# Patient Record
Sex: MALE | Race: WHITE | NOT HISPANIC OR LATINO | Employment: OTHER | URBAN - METROPOLITAN AREA
[De-identification: names, ages, dates, MRNs, and addresses within clinical notes are randomized per-mention and may not be internally consistent; named-entity substitution may affect disease eponyms.]

---

## 2018-09-03 ENCOUNTER — HOSPITAL ENCOUNTER (EMERGENCY)
Facility: HOSPITAL | Age: 68
Discharge: HOME | End: 2018-09-03
Attending: EMERGENCY MEDICINE | Admitting: EMERGENCY MEDICINE
Payer: MEDICARE

## 2018-09-03 VITALS
BODY MASS INDEX: 37.77 KG/M2 | HEIGHT: 66 IN | OXYGEN SATURATION: 99 % | WEIGHT: 235 LBS | SYSTOLIC BLOOD PRESSURE: 136 MMHG | DIASTOLIC BLOOD PRESSURE: 66 MMHG | TEMPERATURE: 98 F | RESPIRATION RATE: 16 BRPM | HEART RATE: 95 BPM

## 2018-09-03 DIAGNOSIS — R60.0 PERIORBITAL EDEMA OF LEFT EYE: Primary | ICD-10-CM

## 2018-09-03 PROCEDURE — 99283 EMERGENCY DEPT VISIT LOW MDM: CPT

## 2018-09-03 RX ORDER — OMEPRAZOLE 40 MG/1
40 CAPSULE, DELAYED RELEASE ORAL DAILY
COMMUNITY

## 2018-09-03 RX ORDER — SIMVASTATIN 40 MG
40 TABLET ORAL
COMMUNITY

## 2018-09-03 RX ORDER — DILTIAZEM HYDROCHLORIDE 120 MG/1
120 TABLET, FILM COATED ORAL DAILY
COMMUNITY

## 2018-09-03 RX ORDER — ALFUZOSIN HYDROCHLORIDE 10 MG/1
10 TABLET, EXTENDED RELEASE ORAL DAILY
COMMUNITY

## 2018-09-03 RX ORDER — METOPROLOL TARTRATE 50 MG/1
25 TABLET, FILM COATED ORAL EVERY 12 HOURS SCHEDULED
COMMUNITY

## 2018-09-03 NOTE — DISCHARGE INSTRUCTIONS
Allergies   AMBULATORY CARE:   Allergies  are an immune system reaction to a substance called an allergen  Your immune system sees the allergen as harmful and attacks it  Common signs and symptoms include the following:   · Mild symptoms  include sneezing and a runny, itchy, or stuffy nose  You may also have swollen, watery, or itchy eyes, or skin itching  You may have swelling or pain where an insect bit or stung you  · Anaphylaxis symptoms  include trouble breathing or swallowing, a rash or hives, or severe swelling  You may also have a cough, wheezing, or feel lightheaded or dizzy  Anaphylaxis is a sudden, life-threatening reaction that needs immediate treatment  Call 911 for signs or symptoms of anaphylaxis,  such as trouble breathing, swelling in your mouth or throat, or wheezing  You may also have itching, a rash, hives, or feel like you are going to faint  Seek care immediately if:   · You have tingling in your hands or feet  · Your skin is red or flushed  Contact your healthcare provider if:   · You have questions or concerns about your condition or care  Steps to take for signs or symptoms of anaphylaxis:   · Immediately  give 1 shot of epinephrine only into the outer thigh muscle  · Leave the shot in place  as directed  Your healthcare provider may recommend you leave it in place for up to 10 seconds before you remove it  This helps make sure all of the epinephrine is delivered  · Call 911 and go to the emergency department,  even if the shot improved symptoms  Do not drive yourself  Bring the used epinephrine shot with you  Treatment for allergies  may include any of the following:  · Antihistamines  help decrease itching, sneezing, and swelling  You may take them as a pill or use drops in your nose or eyes  · Decongestants  help your nose feel less stuffy  · Steroids  decrease swelling and redness  · Topical treatments  help decrease itching or swelling   You also may be given nasal sprays or eyedrops  · Epinephrine  is medicine used to treat severe allergic reactions such as anaphylaxis  · Desensitization  gets your body used to allergens you cannot avoid  Your healthcare provider will give you a shot that contains a small amount of an allergen  He will treat any allergic reaction you have  He will give you more of the allergen a little at a time until your body gets used to it  Your reaction to the allergen may be less serious after this treatment  Your healthcare provider will tell you how long to get the shots  Safety precautions to take if you are at risk for anaphylaxis:   · Keep 2 shots of epinephrine with you at all times  You may need a second shot, because epinephrine only works for about 20 minutes and symptoms may return  Your healthcare provider can show you and family members how to give the shot  Check the expiration date every month and replace it before it expires  · Create an action plan  Your healthcare provider can help you create a written plan that explains the allergy and an emergency plan to treat a reaction  The plan explains when to give a second epinephrine shot if symptoms return or do not improve after the first  Give copies of the action plan and emergency instructions to family members, work and school staff, and  providers  Show them how to give a shot of epinephrine  · Be careful when you exercise  If you have had exercise-induced anaphylaxis, do not exercise right after you eat  Stop exercising right away if you start to develop any signs or symptoms of anaphylaxis  You may first feel tired, warm, or have itchy skin  Hives, swelling, and severe breathing problems may develop if you continue to exercise  · Carry medical alert identification  Wear medical alert jewelry or carry a card that explains the allergy  Ask your healthcare provider where to get these items       · Inform all healthcare providers of the allergy  This includes dentists, nurses, doctors, and surgeons  Manage allergies:   · Use nasal rinses as directed  Rinse with a saline solution daily to help clear your nose of allergens  · Do not smoke  Allergy symptoms may decrease if you are not around smoke  Nicotine and other chemicals in cigarettes and cigars can cause lung damage  Ask your healthcare provider for information if you currently smoke and need help to quit  E-cigarettes or smokeless tobacco still contain nicotine  Talk to your healthcare provider before you use these products  Prevent an allergic reaction:   · Do not go outside when pollen counts are high if you have seasonal allergies  Your symptoms may be better if you go outside only in the morning or evening  Use your air conditioner, and change air filters often  · Avoid dust, fur, and mold  Dust and vacuum your home often  You may want to wear a mask when you vacuum  Keep pets in certain rooms, and bathe them often  Use a dehumidifier (machine that decreases moisture) to help prevent mold  · Do not use products that contain latex if you have a latex allergy  Use nonlatex gloves if you work in healthcare or in food preparation  Always tell healthcare providers about a latex allergy  · Avoid areas that attract insects if you have an insect bite or sting allergy  Areas include trash cans, gardens, and picnics  Do not wear bright clothing or strong scents when you will be outside  Follow up with your healthcare provider as directed:  Write down your questions so you remember to ask them during your visits  When you have an allergic reaction, write down everything you were exposed to in the 2 hours before the reaction  Take that information to your next visit  © 2017 [employer] Information is for End User's use only and may not be sold, redistributed or otherwise used for commercial purposes   All illustrations and images included in CareNotes® are the copyrighted property of [employer]  or Rom[employer]  The above information is an  only  It is not intended as medical advice for individual conditions or treatments  Talk to your doctor, nurse or pharmacist before following any medical regimen to see if it is safe and effective for you

## 2018-09-03 NOTE — ED PROVIDER NOTES
History  Chief Complaint   Patient presents with    Eye Swelling     left upper and lower eyelid swelling starting earlier this am when he felt  like his eye was itchy,then he scratched it,unsure if it was a bug bite     Patient presents for evaluation of left upper and lower eyelid swelling  States it felt itchy and he rubbed it and afterwards noticed the swelling  History of seasonal allergies although not this time of year  No trouble seeing vision is normal  No eye pain  History provided by:  Patient   used: No        Prior to Admission Medications   Prescriptions Last Dose Informant Patient Reported? Taking? alfuzosin (UROXATRAL) 10 mg 24 hr tablet   Yes Yes   Sig: Take 10 mg by mouth daily   apixaban (ELIQUIS) 5 mg   Yes Yes   Sig: Take 5 mg by mouth 2 (two) times a day   diltiazem (CARDIZEM) 120 MG tablet   Yes Yes   Sig: Take 120 mg by mouth daily   metoprolol tartrate (LOPRESSOR) 50 mg tablet   Yes Yes   Sig: Take 25 mg by mouth every 12 (twelve) hours   omeprazole (PriLOSEC) 40 MG capsule   Yes Yes   Sig: Take 40 mg by mouth daily   simvastatin (ZOCOR) 40 mg tablet   Yes Yes   Sig: Take 40 mg by mouth daily at bedtime      Facility-Administered Medications: None       Past Medical History:   Diagnosis Date    Cardiac disease     atrial fib,pacemaker    Hyperlipidemia     Hypertension        History reviewed  No pertinent surgical history  History reviewed  No pertinent family history  I have reviewed and agree with the history as documented  Social History   Substance Use Topics    Smoking status: Former Smoker    Smokeless tobacco: Never Used    Alcohol use Yes      Comment: occas        Review of Systems   All other systems reviewed and are negative  Physical Exam  Physical Exam   Constitutional: He is oriented to person, place, and time  No distress     HENT:   Mouth/Throat: Oropharynx is clear and moist    Eyes: Conjunctivae, EOM and lids are normal  Pupils are equal, round, and reactive to light  Lids are everted and swept, no foreign bodies found  Cardiovascular: Normal rate, regular rhythm and intact distal pulses  Pulmonary/Chest: Effort normal and breath sounds normal  No respiratory distress  Musculoskeletal: Normal range of motion  Neurological: He is alert and oriented to person, place, and time  Skin: Capillary refill takes less than 2 seconds  He is not diaphoretic  Nursing note and vitals reviewed  Vital Signs  ED Triage Vitals [09/03/18 1224]   Temperature Pulse Respirations Blood Pressure SpO2   98 °F (36 7 °C) 95 16 136/66 99 %      Temp Source Heart Rate Source Patient Position - Orthostatic VS BP Location FiO2 (%)   Tympanic Monitor Sitting Right arm --      Pain Score       No Pain           Vitals:    09/03/18 1224   BP: 136/66   Pulse: 95   Patient Position - Orthostatic VS: Sitting       Visual Acuity      ED Medications  Medications - No data to display    Diagnostic Studies  Results Reviewed     None                 No orders to display              Procedures  Procedures       Phone Contacts  ED Phone Contact    ED Course                               MDM  Number of Diagnoses or Management Options  Periorbital edema of left eye:   Diagnosis management comments: Pulse ox 99% on RA indicating adequate oxygenation    Appears to be local reaction, no signs of infection at this time  Given strict return instructions and advised follow up with PMD to resolution          Amount and/or Complexity of Data Reviewed  Decide to obtain previous medical records or to obtain history from someone other than the patient: yes  Review and summarize past medical records: yes    Patient Progress  Patient progress: stable    CritCare Time    Disposition  Final diagnoses:   Periorbital edema of left eye     Time reflects when diagnosis was documented in both MDM as applicable and the Disposition within this note     Time User Action Codes Description Comment    9/3/2018 12:37 PM Wally Fleming Add [R60 0] Periorbital edema of left eye       ED Disposition     ED Disposition Condition Comment    Discharge  Damien Liu discharge to home/self care  Condition at discharge: stable        Follow-up Information     Follow up With Specialties Details Why Contact Info Additional Information    Beatrice Campbell MD  In 2 days For wound re-check Ericka [phone]       [address] [facility] Emergency Department Emergency Medicine  If symptoms worsen [address] [facility] [address]  [phone] [facility], [place], [place], [address]          Discharge Medication List as of 9/3/2018 12:39 PM      CONTINUE these medications which have NOT CHANGED    Details   alfuzosin (UROXATRAL) 10 mg 24 hr tablet Take 10 mg by mouth daily, Historical Med      apixaban (ELIQUIS) 5 mg Take 5 mg by mouth 2 (two) times a day, Historical Med      diltiazem (CARDIZEM) 120 MG tablet Take 120 mg by mouth daily, Historical Med      metoprolol tartrate (LOPRESSOR) 50 mg tablet Take 25 mg by mouth every 12 (twelve) hours, Historical Med      omeprazole (PriLOSEC) 40 MG capsule Take 40 mg by mouth daily, Historical Med      simvastatin (ZOCOR) 40 mg tablet Take 40 mg by mouth daily at bedtime, Historical Med           No discharge procedures on file      ED Provider  Electronically Signed by           Lyudmila Donahue DO  09/03/18 5204

## 2022-09-07 ENCOUNTER — APPOINTMENT (EMERGENCY)
Dept: RADIOLOGY | Facility: HOSPITAL | Age: 72
End: 2022-09-07
Payer: MEDICARE

## 2022-09-07 ENCOUNTER — HOSPITAL ENCOUNTER (EMERGENCY)
Facility: HOSPITAL | Age: 72
Discharge: HOME | End: 2022-09-07
Attending: EMERGENCY MEDICINE
Payer: MEDICARE

## 2022-09-07 ENCOUNTER — APPOINTMENT (OUTPATIENT)
Dept: RADIOLOGY | Facility: HOSPITAL | Age: 72
End: 2022-09-07
Payer: MEDICARE

## 2022-09-07 VITALS
WEIGHT: 290 LBS | OXYGEN SATURATION: 98 % | RESPIRATION RATE: 18 BRPM | SYSTOLIC BLOOD PRESSURE: 153 MMHG | HEART RATE: 60 BPM | DIASTOLIC BLOOD PRESSURE: 67 MMHG | TEMPERATURE: 97.7 F | BODY MASS INDEX: 46.81 KG/M2

## 2022-09-07 DIAGNOSIS — M66.0 RUPTURED BAKERS CYST: Primary | ICD-10-CM

## 2022-09-07 LAB
ALBUMIN SERPL BCP-MCNC: 3.5 G/DL (ref 3.5–5)
ALP SERPL-CCNC: 63 U/L (ref 46–116)
ALT SERPL W P-5'-P-CCNC: 26 U/L (ref 12–78)
ANION GAP SERPL CALCULATED.3IONS-SCNC: 6 MMOL/L (ref 4–13)
APTT PPP: 22 SECONDS (ref 23–37)
AST SERPL W P-5'-P-CCNC: 20 U/L (ref 5–45)
ATRIAL RATE: 70 BPM
BASOPHILS # BLD AUTO: 0.01 THOUSANDS/ΜL (ref 0–0.1)
BASOPHILS NFR BLD AUTO: 0 % (ref 0–1)
BILIRUB SERPL-MCNC: 0.46 MG/DL (ref 0.2–1)
BUN SERPL-MCNC: 20 MG/DL (ref 5–25)
CALCIUM SERPL-MCNC: 8.3 MG/DL (ref 8.3–10.1)
CHLORIDE SERPL-SCNC: 105 MMOL/L (ref 96–108)
CO2 SERPL-SCNC: 28 MMOL/L (ref 21–32)
CREAT SERPL-MCNC: 1.09 MG/DL (ref 0.6–1.3)
EOSINOPHIL # BLD AUTO: 0.05 THOUSAND/ΜL (ref 0–0.61)
EOSINOPHIL NFR BLD AUTO: 1 % (ref 0–6)
ERYTHROCYTE [DISTWIDTH] IN BLOOD BY AUTOMATED COUNT: 14.5 % (ref 11.6–15.1)
FLUAV RNA RESP QL NAA+PROBE: NEGATIVE
FLUBV RNA RESP QL NAA+PROBE: NEGATIVE
GFR SERPL CREATININE-BSD FRML MDRD: 66 ML/MIN/1.73SQ M
GLUCOSE SERPL-MCNC: 132 MG/DL (ref 65–140)
HCT VFR BLD AUTO: 37 % (ref 36.5–49.3)
HGB BLD-MCNC: 12.4 G/DL (ref 12–17)
IMM GRANULOCYTES # BLD AUTO: 0.03 THOUSAND/UL (ref 0–0.2)
IMM GRANULOCYTES NFR BLD AUTO: 1 % (ref 0–2)
INR PPP: 0.97 (ref 0.84–1.19)
LYMPHOCYTES # BLD AUTO: 0.51 THOUSANDS/ΜL (ref 0.6–4.47)
LYMPHOCYTES NFR BLD AUTO: 14 % (ref 14–44)
MAGNESIUM SERPL-MCNC: 1.9 MG/DL (ref 1.6–2.6)
MCH RBC QN AUTO: 35 PG (ref 26.8–34.3)
MCHC RBC AUTO-ENTMCNC: 33.5 G/DL (ref 31.4–37.4)
MCV RBC AUTO: 105 FL (ref 82–98)
MONOCYTES # BLD AUTO: 0.52 THOUSAND/ΜL (ref 0.17–1.22)
MONOCYTES NFR BLD AUTO: 14 % (ref 4–12)
NEUTROPHILS # BLD AUTO: 2.57 THOUSANDS/ΜL (ref 1.85–7.62)
NEUTS SEG NFR BLD AUTO: 70 % (ref 43–75)
NRBC BLD AUTO-RTO: 0 /100 WBCS
NT-PROBNP SERPL-MCNC: 127 PG/ML
P AXIS: 54 DEGREES
PHOSPHATE SERPL-MCNC: 2.7 MG/DL (ref 2.3–4.1)
PLATELET # BLD AUTO: 189 THOUSANDS/UL (ref 149–390)
PMV BLD AUTO: 8.9 FL (ref 8.9–12.7)
POTASSIUM SERPL-SCNC: 3.9 MMOL/L (ref 3.5–5.3)
PR INTERVAL: 148 MS
PROT SERPL-MCNC: 6.6 G/DL (ref 6.4–8.4)
PROTHROMBIN TIME: 13 SECONDS (ref 11.6–14.5)
QRS AXIS: -16 DEGREES
QRSD INTERVAL: 156 MS
QT INTERVAL: 456 MS
QTC INTERVAL: 492 MS
RBC # BLD AUTO: 3.54 MILLION/UL (ref 3.88–5.62)
RSV RNA RESP QL NAA+PROBE: NEGATIVE
SARS-COV-2 RNA RESP QL NAA+PROBE: NEGATIVE
SODIUM SERPL-SCNC: 139 MMOL/L (ref 135–147)
T WAVE AXIS: 12 DEGREES
TSH SERPL DL<=0.05 MIU/L-ACNC: 0.58 UIU/ML (ref 0.45–4.5)
VENTRICULAR RATE: 70 BPM
WBC # BLD AUTO: 3.69 THOUSAND/UL (ref 4.31–10.16)

## 2022-09-07 PROCEDURE — 96374 THER/PROPH/DIAG INJ IV PUSH: CPT

## 2022-09-07 PROCEDURE — 93970 EXTREMITY STUDY: CPT | Performed by: SURGERY

## 2022-09-07 PROCEDURE — 84443 ASSAY THYROID STIM HORMONE: CPT | Performed by: PHYSICIAN ASSISTANT

## 2022-09-07 PROCEDURE — 71045 X-RAY EXAM CHEST 1 VIEW: CPT

## 2022-09-07 PROCEDURE — 85730 THROMBOPLASTIN TIME PARTIAL: CPT | Performed by: PHYSICIAN ASSISTANT

## 2022-09-07 PROCEDURE — 36415 COLL VENOUS BLD VENIPUNCTURE: CPT | Performed by: PHYSICIAN ASSISTANT

## 2022-09-07 PROCEDURE — 0241U HB NFCT DS VIR RESP RNA 4 TRGT: CPT | Performed by: PHYSICIAN ASSISTANT

## 2022-09-07 PROCEDURE — 93010 ELECTROCARDIOGRAM REPORT: CPT | Performed by: INTERNAL MEDICINE

## 2022-09-07 PROCEDURE — 83880 ASSAY OF NATRIURETIC PEPTIDE: CPT | Performed by: PHYSICIAN ASSISTANT

## 2022-09-07 PROCEDURE — 99284 EMERGENCY DEPT VISIT MOD MDM: CPT

## 2022-09-07 PROCEDURE — 85025 COMPLETE CBC W/AUTO DIFF WBC: CPT | Performed by: PHYSICIAN ASSISTANT

## 2022-09-07 PROCEDURE — 80053 COMPREHEN METABOLIC PANEL: CPT | Performed by: PHYSICIAN ASSISTANT

## 2022-09-07 PROCEDURE — 83735 ASSAY OF MAGNESIUM: CPT | Performed by: PHYSICIAN ASSISTANT

## 2022-09-07 PROCEDURE — 84100 ASSAY OF PHOSPHORUS: CPT | Performed by: PHYSICIAN ASSISTANT

## 2022-09-07 PROCEDURE — 99284 EMERGENCY DEPT VISIT MOD MDM: CPT | Performed by: PHYSICIAN ASSISTANT

## 2022-09-07 PROCEDURE — 93005 ELECTROCARDIOGRAM TRACING: CPT

## 2022-09-07 PROCEDURE — 85610 PROTHROMBIN TIME: CPT | Performed by: PHYSICIAN ASSISTANT

## 2022-09-07 PROCEDURE — 93970 EXTREMITY STUDY: CPT

## 2022-09-07 RX ORDER — FUROSEMIDE 10 MG/ML
40 INJECTION INTRAMUSCULAR; INTRAVENOUS ONCE
Status: COMPLETED | OUTPATIENT
Start: 2022-09-07 | End: 2022-09-07

## 2022-09-07 RX ADMIN — FUROSEMIDE 40 MG: 10 INJECTION, SOLUTION INTRAMUSCULAR; INTRAVENOUS at 09:35

## 2022-09-07 NOTE — ED PROVIDER NOTES
History  Chief Complaint   Patient presents with    Leg Pain     Left leg swelling   Pain in the calf  Was oozing clear fluid a few days ago     PT is a 68 yo male with pmh of atrial fibrillation status post permanent pacemaker anticoagulated with Eliquis, GERD, hyperlipidemia, who presents today for evaluation of left lower extremity swelling and pain  Patient specifically denies injury  He describes the pain as intermittent, shooting and cramping, across the back of the calf, the pain is worsened by crossing his legs and lying in bed, the pain is unrelieved by elevation, arthritis medication, or rest       History provided by:  Patient  Leg Pain  Location:  Leg  Time since incident:  1 week  Injury: no    Leg location:  L leg  Pain details:     Quality:  Shooting and cramping    Radiates to:  Does not radiate    Severity:  Moderate    Onset quality:  Gradual    Duration:  1 week    Timing:  Constant    Progression:  Worsening  Chronicity:  New  Dislocation: no    Prior injury to area:  No  Relieved by:  Nothing  Exacerbated by: Crossing legs, lying in bed  Ineffective treatments:  Arthritis medication and rest  Associated symptoms: swelling    Associated symptoms: no back pain, no decreased ROM, no fatigue, no fever, no itching, no muscle weakness, no neck pain, no numbness, no stiffness and no tingling    Swelling:     Location:  Leg    Onset quality:  Gradual    Duration:  1 week    Timing:  Constant    Progression:  Unchanged    Chronicity:  New  Risk factors: no concern for non-accidental trauma, no frequent fractures, no known bone disorder, no obesity and no recent illness        Prior to Admission Medications   Prescriptions Last Dose Informant Patient Reported? Taking?    alfuzosin (UROXATRAL) 10 mg 24 hr tablet   Yes No   Sig: Take 10 mg by mouth daily   apixaban (ELIQUIS) 5 mg   Yes No   Sig: Take 5 mg by mouth 2 (two) times a day   diltiazem (CARDIZEM) 120 MG tablet   Yes No   Sig: Take 120 mg by mouth daily   metoprolol tartrate (LOPRESSOR) 50 mg tablet   Yes No   Sig: Take 25 mg by mouth every 12 (twelve) hours   omeprazole (PriLOSEC) 40 MG capsule   Yes No   Sig: Take 40 mg by mouth daily   simvastatin (ZOCOR) 40 mg tablet   Yes No   Sig: Take 40 mg by mouth daily at bedtime      Facility-Administered Medications: None       Past Medical History:   Diagnosis Date    Cardiac disease     atrial fib,pacemaker    Hyperlipidemia     Hypertension        History reviewed  No pertinent surgical history  History reviewed  No pertinent family history  I have reviewed and agree with the history as documented  E-Cigarette/Vaping    E-Cigarette Use Never User      E-Cigarette/Vaping Substances    Nicotine No     THC No     CBD No     Flavoring No     Other No     Unknown No      Social History     Tobacco Use    Smoking status: Former Smoker    Smokeless tobacco: Never Used   Vaping Use    Vaping Use: Never used   Substance Use Topics    Alcohol use: Yes     Comment: occas    Drug use: Never       Review of Systems   Constitutional: Negative for chills, fatigue and fever  HENT: Negative for ear pain and sore throat  Eyes: Negative for pain and visual disturbance  Respiratory: Negative for cough and shortness of breath  Cardiovascular: Negative for chest pain and palpitations  Gastrointestinal: Negative for abdominal pain and vomiting  Endocrine: Negative  Genitourinary: Negative for dysuria and hematuria  Musculoskeletal: Positive for gait problem  Negative for arthralgias, back pain, neck pain and stiffness  Left lower extremity swelling and pain   Skin: Negative for color change, itching and rash  Allergic/Immunologic: Negative  Neurological: Negative for seizures and syncope  Hematological: Negative  Psychiatric/Behavioral: Negative  All other systems reviewed and are negative  Physical Exam  Physical Exam  Vitals and nursing note reviewed  Constitutional:       Appearance: He is well-developed  HENT:      Head: Normocephalic and atraumatic  Nose: Nose normal       Mouth/Throat:      Mouth: Mucous membranes are moist    Eyes:      General: No scleral icterus  Extraocular Movements: Extraocular movements intact  Conjunctiva/sclera: Conjunctivae normal       Pupils: Pupils are equal, round, and reactive to light  Cardiovascular:      Rate and Rhythm: Normal rate and regular rhythm  Heart sounds: No murmur heard  Pulmonary:      Effort: Pulmonary effort is normal  No respiratory distress  Breath sounds: Normal breath sounds  Abdominal:      General: Bowel sounds are normal  There is distension  Palpations: Abdomen is soft  Tenderness: There is no abdominal tenderness  There is no guarding or rebound  Musculoskeletal:      Cervical back: Normal range of motion and neck supple  No rigidity  Right lower leg: No tenderness  3+ Pitting Edema present  Left lower leg: Tenderness present  4+ Pitting Edema present  Right ankle: Swelling present  Right Achilles Tendon: Normal       Left ankle: Swelling present  Left Achilles Tendon: Normal       Right foot: Normal       Left foot: Normal         Legs:    Skin:     General: Skin is warm and dry  Capillary Refill: Capillary refill takes less than 2 seconds  Neurological:      General: No focal deficit present  Mental Status: He is alert and oriented to person, place, and time  Cranial Nerves: No cranial nerve deficit     Psychiatric:         Mood and Affect: Mood normal          Vital Signs  ED Triage Vitals   Temperature Pulse Respirations Blood Pressure SpO2   09/07/22 0847 09/07/22 0847 09/07/22 0847 09/07/22 0847 09/07/22 0847   97 7 °F (36 5 °C) 83 16 (!) 174/79 97 %      Temp src Heart Rate Source Patient Position - Orthostatic VS BP Location FiO2 (%)   -- 09/07/22 0911 09/07/22 0911 09/07/22 0911 --    Monitor Lying Right arm Pain Score       09/07/22 0847       5           Vitals:    09/07/22 0847 09/07/22 0911 09/07/22 1045   BP: (!) 174/79 132/86 153/67   Pulse: 83 70 60   Patient Position - Orthostatic VS:  Lying          Visual Acuity      ED Medications  Medications   furosemide (LASIX) injection 40 mg (40 mg Intravenous Given 9/7/22 0935)       Diagnostic Studies  Results Reviewed     Procedure Component Value Units Date/Time    FLU/RSV/COVID - if FLU/RSV clinically relevant [[id number]]  (Normal) Collected: 09/07/22 0930    Lab Status: Final result Specimen: Nares from Nose Updated: 09/07/22 1019     SARS-CoV-2 Negative     INFLUENZA A PCR Negative     INFLUENZA B PCR Negative     RSV PCR Negative    Narrative:      FOR PEDIATRIC PATIENTS - copy/paste COVID Guidelines URL to browser: [email or web address]    SARS-CoV-2 assay is a Nucleic Acid Amplification assay intended for the  qualitative detection of nucleic acid from SARS-CoV-2 in nasopharyngeal  swabs  Results are for the presumptive identification of SARS-CoV-2 RNA  Positive results are indicative of infection with SARS-CoV-2, the virus  causing COVID-19, but do not rule out bacterial infection or co-infection  with other viruses  Laboratories within the [place] and its  territories are required to report all positive results to the appropriate  public health authorities  Negative results do not preclude SARS-CoV-2  infection and should not be used as the sole basis for treatment or other  patient management decisions  Negative results must be combined with  clinical observations, patient history, and epidemiological information  This test has not been FDA cleared or approved  This test has been authorized by FDA under an Emergency Use Authorization  (EUA)   This test is only authorized for the duration of time the  declaration that circumstances exist justifying the authorization of the  emergency use of an in vitro diagnostic tests for detection of SARS-CoV-2  virus and/or diagnosis of COVID-19 infection under section 564(b)(1) of  the Act, 21 U  S C  926ADK-3(R)(1), unless the authorization is terminated  or revoked sooner  The test has been validated but independent review by FDA  and CLIA is pending  Test performed using [employer] Gene[employer]pert: This RT-PCR assay targets N2,  a region unique to SARS-CoV-2  A conserved region in the E-gene was chosen  for pan-Sarbecovirus detection which includes SARS-CoV-2  TSH [[id number]]  (Normal) Collected: 09/07/22 0930    Lab Status: Final result Specimen: Blood from Arm, Left Updated: 09/07/22 1002     TSH 3RD GENERATON 0 580 uIU/mL     Narrative:      Patients undergoing fluorescein dye angiography may retain small amounts of fluorescein in the body for 48-72 hours post procedure  Samples containing fluorescein can produce falsely depressed TSH values  If the patient had this procedure,a specimen should be resubmitted post fluorescein clearance        Phosphorus [[id number]]  (Normal) Collected: 09/07/22 0930    Lab Status: Final result Specimen: Blood from Arm, Left Updated: 09/07/22 1002     Phosphorus 2 7 mg/dL     Magnesium [[id number]]  (Normal) Collected: 09/07/22 0930    Lab Status: Final result Specimen: Blood from Arm, Left Updated: 09/07/22 1002     Magnesium 1 9 mg/dL     NT-BNP PRO [[id number]]  (Abnormal) Collected: 09/07/22 0930    Lab Status: Final result Specimen: Blood from Arm, Left Updated: 09/07/22 1002     NT-proBNP 127 pg/mL     Comprehensive metabolic panel [[id number]] Collected: 09/07/22 0930    Lab Status: Final result Specimen: Blood from Arm, Left Updated: 09/07/22 1000     Sodium 139 mmol/L      Potassium 3 9 mmol/L      Chloride 105 mmol/L      CO2 28 mmol/L      ANION GAP 6 mmol/L      BUN 20 mg/dL      Creatinine 1 09 mg/dL      Glucose 132 mg/dL      Calcium 8 3 mg/dL      AST 20 U/L      ALT 26 U/L      Alkaline Phosphatase 63 U/L      Total Protein 6 6 g/dL      Albumin 3 5 g/dL      Total Bilirubin 0 46 mg/dL      eGFR 66 ml/min/1 73sq m     Narrative:      [place] guidelines for Chronic Kidney Disease (CKD):     Stage 1 with normal or high GFR (GFR > 90 mL/min/1 73 square meters)    Stage 2 Mild CKD (GFR = 60-89 mL/min/1 73 square meters)    Stage 3A Moderate CKD (GFR = 45-59 mL/min/1 73 square meters)    Stage 3B Moderate CKD (GFR = 30-44 mL/min/1 73 square meters)    Stage 4 Severe CKD (GFR = 15-29 mL/min/1 73 square meters)    Stage 5 End Stage CKD (GFR <15 mL/min/1 73 square meters)  Note: GFR calculation is accurate only with a steady state creatinine    Protime-INR [[id number]]  (Normal) Collected: 09/07/22 0930    Lab Status: Final result Specimen: Blood from Arm, Left Updated: 09/07/22 0950     Protime 13 0 seconds      INR 0 97    APTT [[id number]]  (Abnormal) Collected: 09/07/22 0930    Lab Status: Final result Specimen: Blood from Arm, Left Updated: 09/07/22 0950     PTT 22 seconds     CBC and differential [[id number]]  (Abnormal) Collected: 09/07/22 0930    Lab Status: Final result Specimen: Blood from Arm, Left Updated: 09/07/22 0936     WBC 3 69 Thousand/uL      RBC 3 54 Million/uL      Hemoglobin 12 4 g/dL      Hematocrit 37 0 %       fL      MCH 35 0 pg      MCHC 33 5 g/dL      RDW 14 5 %      MPV 8 9 fL      Platelets 011 Thousands/uL      nRBC 0 /100 WBCs      Neutrophils Relative 70 %      Immat GRANS % 1 %      Lymphocytes Relative 14 %      Monocytes Relative 14 %      Eosinophils Relative 1 %      Basophils Relative 0 %      Neutrophils Absolute 2 57 Thousands/µL      Immature Grans Absolute 0 03 Thousand/uL      Lymphocytes Absolute 0 51 Thousands/µL      Monocytes Absolute 0 52 Thousand/µL      Eosinophils Absolute 0 05 Thousand/µL      Basophils Absolute 0 01 Thousands/µL                  VAS lower limb venous duplex study, complete bilateral   Final Result by Brian Rowell MD (09/07 9677) XR chest 1 view portable   Final Result by Bonifacio Miranda MD (09/07 1008)      No acute cardiopulmonary disease                    Workstation performed: [id number]                    Procedures  ECG 12 Lead Documentation Only    Date/Time: 9/7/2022 9:19 AM  Performed by: Ofilia Harada, PA-C  Authorized by: Ofilia Harada, PA-C     Patient location:  ED  Rate:     ECG rate:  70    ECG rate assessment: normal    Rhythm:     Rhythm: sinus rhythm and paced    Pacing:     Capture:  Intermittent    Type of pacing:  AV (Atrial sensing ventricular pacing)  Comments:                   ED Course  ED Course as of 09/07/22 1735   Wed Sep 07, 2022   0919 Blood Pressure(!): 174/79   0941 WBC(!): 3 69   0942 Hemoglobin: 12 4                                             MDM  Number of Diagnoses or Management Options  Ruptured Bakers cyst: new and requires workup  Diagnosis management comments: Patient with left lower extremity pain and edema  Ultrasound reveals ruptured Baker cyst  Patient [id number] with orthopedics for further evaluation  Patient educated on red flag symptoms that would necessitate return to the ED         Amount and/or Complexity of Data Reviewed  Clinical lab tests: ordered and reviewed  Tests in the radiology section of CPT®: ordered and reviewed  Tests in the medicine section of CPT®: ordered and reviewed  Decide to obtain previous medical records or to obtain history from someone other than the patient: yes  Review and summarize past medical records: yes  Independent visualization of images, tracings, or specimens: yes    Risk of Complications, Morbidity, and/or Mortality  Presenting problems: moderate  Diagnostic procedures: moderate  Management options: moderate    Patient Progress  Patient progress: stable      Disposition  Final diagnoses:   Ruptured Bakers cyst     Time reflects when diagnosis was documented in both MDM as applicable and the Disposition within this note     Time User Action Codes Description Comment    9/7/2022 10:46 AM Marco Antonio Elmore Add [M66 0] Ruptured Bakers cyst       ED Disposition     ED Disposition   Discharge    Condition   Stable    Date/Time   Wed Sep 7, 2022 10:45 AM    Comment   Fitz Liu discharge to home/self care                 Follow-up Information     Follow up With Specialties Details Why Contact Info Additional Information    Virgil Daly MD  Call  As needed [address]  [facility] [phone]  [phone]       [facility]   [address]  [facility] [address], [address] [facility] [address] [place], [address] [facility], [address] [place], [place], [address] [facility] [place]    [address] [facility] Emergency Department Emergency Medicine Go to  If symptoms worsen [address] [facility] [address]  [address] [address] Emergency Department, [address], [address] [facility] Rody Rashid MD Orthopedic Surgery Schedule an appointment as soon as possible for a visit   [address] [place] [address]  [phone]             Discharge Medication List as of 9/7/2022 10:49 AM      CONTINUE these medications which have NOT CHANGED    Details   alfuzosin (UROXATRAL) 10 mg 24 hr tablet Take 10 mg by mouth daily, Historical Med      apixaban (ELIQUIS) 5 mg Take 5 mg by mouth 2 (two) times a day, Historical Med      diltiazem (CARDIZEM) 120 MG tablet Take 120 mg by mouth daily, Historical Med      metoprolol tartrate (LOPRESSOR) 50 mg tablet Take 25 mg by mouth every 12 (twelve) hours, Historical Med      omeprazole (PriLOSEC) 40 MG capsule Take 40 mg by mouth daily, Historical Med      simvastatin (ZOCOR) 40 mg tablet Take 40 mg by mouth daily at bedtime, Historical Med             No discharge procedures on file      PDMP Review     None          ED Provider  Electronically Signed by           Eva Mehta PA-C  09/07/22 8858

## 2022-09-24 ENCOUNTER — APPOINTMENT (OUTPATIENT)
Dept: RADIOLOGY | Facility: HOSPITAL | Age: 72
End: 2022-09-24
Payer: MEDICARE

## 2022-09-24 ENCOUNTER — HOSPITAL ENCOUNTER (EMERGENCY)
Facility: HOSPITAL | Age: 72
Discharge: HOME | End: 2022-09-24
Attending: EMERGENCY MEDICINE
Payer: MEDICARE

## 2022-09-24 VITALS
RESPIRATION RATE: 20 BRPM | SYSTOLIC BLOOD PRESSURE: 145 MMHG | DIASTOLIC BLOOD PRESSURE: 70 MMHG | HEART RATE: 60 BPM | TEMPERATURE: 98.1 F | OXYGEN SATURATION: 96 %

## 2022-09-24 DIAGNOSIS — R60.0 BILATERAL LOWER EXTREMITY EDEMA: Primary | ICD-10-CM

## 2022-09-24 DIAGNOSIS — M66.0 RUPTURED BAKERS CYST: ICD-10-CM

## 2022-09-24 PROCEDURE — 99283 EMERGENCY DEPT VISIT LOW MDM: CPT

## 2022-09-24 PROCEDURE — 73564 X-RAY EXAM KNEE 4 OR MORE: CPT

## 2022-09-24 PROCEDURE — 99282 EMERGENCY DEPT VISIT SF MDM: CPT | Performed by: PHYSICIAN ASSISTANT

## 2022-09-24 NOTE — ED PROVIDER NOTES
History  Chief Complaint   Patient presents with    Leg Pain     Pt was seen in ED 9/7 for left leg pain, diagnosed as bakers cyst   Pain is worse in left leg, leg is red and swollen     Leg Swelling     76-year-old male presenting today with left-sided leg pain has been ongoing over the past month  Relays that he had worsening left-sided knee pain a few days ago as he was kneeling on the ground picking up a hearing aid feeling a sharp pain  Since that point has had pain  Has had continued left lower extremity swelling after being diagnosed with ruptured Baker cyst on 09/07  Swelling has not worsened  Relays that he has been following up with his doctor regarding this  States that the coloration has not changed, states that they is always a slight red hue  Has not had any worsening pain to the calf  Taking his Eliquis as prescribed  Has not had any falls  Does not wear compression stockings regularly  Also does not perform regular elevation  Relays he recently had an increase in his diuretic  Denies chest pain, shortness breath, nausea vomiting, numbness or paresthesias, fevers  Prior to Admission Medications   Prescriptions Last Dose Informant Patient Reported? Taking? alfuzosin (UROXATRAL) 10 mg 24 hr tablet   Yes No   Sig: Take 10 mg by mouth daily   apixaban (ELIQUIS) 5 mg   Yes No   Sig: Take 5 mg by mouth 2 (two) times a day   diltiazem (CARDIZEM) 120 MG tablet   Yes No   Sig: Take 120 mg by mouth daily   metoprolol tartrate (LOPRESSOR) 50 mg tablet   Yes No   Sig: Take 25 mg by mouth every 12 (twelve) hours   omeprazole (PriLOSEC) 40 MG capsule   Yes No   Sig: Take 40 mg by mouth daily   simvastatin (ZOCOR) 40 mg tablet   Yes No   Sig: Take 40 mg by mouth daily at bedtime      Facility-Administered Medications: None       Past Medical History:   Diagnosis Date    Cardiac disease     atrial fib,pacemaker    Hyperlipidemia     Hypertension        History reviewed   No pertinent surgical history  History reviewed  No pertinent family history  I have reviewed and agree with the history as documented  E-Cigarette/Vaping    E-Cigarette Use Never User      E-Cigarette/Vaping Substances    Nicotine No     THC No     CBD No     Flavoring No     Other No     Unknown No      Social History     Tobacco Use    Smoking status: Former Smoker    Smokeless tobacco: Never Used   Vaping Use    Vaping Use: Never used   Substance Use Topics    Alcohol use: Yes     Comment: occas    Drug use: Never       Review of Systems   Constitutional: Negative  Negative for chills, fatigue and fever  HENT: Negative  Negative for congestion, postnasal drip, rhinorrhea and sore throat  Eyes: Negative  Respiratory: Negative  Negative for cough, shortness of breath and wheezing  Cardiovascular: Positive for leg swelling  Negative for chest pain and palpitations  Gastrointestinal: Negative  Negative for abdominal pain, diarrhea, nausea and vomiting  Endocrine: Negative  Genitourinary: Negative  Musculoskeletal: Positive for arthralgias  Negative for back pain, gait problem, joint swelling, myalgias, neck pain and neck stiffness  Skin: Negative  Neurological: Negative  Hematological: Negative  Psychiatric/Behavioral: Negative  All other systems reviewed and are negative  Physical Exam  Physical Exam  Vitals and nursing note reviewed  Constitutional:       General: He is not in acute distress  Appearance: He is well-developed  He is not diaphoretic  HENT:      Head: Normocephalic and atraumatic  Right Ear: External ear normal       Left Ear: External ear normal       Nose: Nose normal       Mouth/Throat:      Pharynx: No oropharyngeal exudate  Eyes:      General: No scleral icterus  Right eye: No discharge  Left eye: No discharge  Conjunctiva/sclera: Conjunctivae normal       Pupils: Pupils are equal, round, and reactive to light  Cardiovascular:      Rate and Rhythm: Normal rate and regular rhythm  Pulses: Normal pulses  Heart sounds: Normal heart sounds  No murmur heard  No friction rub  No gallop  Pulmonary:      Effort: Pulmonary effort is normal  No respiratory distress  Breath sounds: Normal breath sounds  No stridor  No wheezing, rhonchi or rales  Comments: S PO2 is 98% indicating adequate oxygenation  Chest:      Chest wall: No tenderness  Abdominal:      General: Bowel sounds are normal  There is no distension  Palpations: Abdomen is soft  There is no mass  Tenderness: There is no abdominal tenderness  There is no guarding or rebound  Hernia: No hernia is present  Musculoskeletal:         General: Swelling present  No tenderness  Cervical back: Normal range of motion and neck supple  Right lower leg: Edema present  Left lower leg: Edema present  Legs:    Lymphadenopathy:      Cervical: No cervical adenopathy  Skin:     General: Skin is warm and dry  Capillary Refill: Capillary refill takes less than 2 seconds  Coloration: Skin is not pale  Findings: No erythema or rash  Neurological:      General: No focal deficit present  Mental Status: He is alert and oriented to person, place, and time  Mental status is at baseline           Vital Signs  ED Triage Vitals [09/24/22 0930]   Temperature Pulse Respirations Blood Pressure SpO2   98 5 °F (36 9 °C) 87 20 145/70 98 %      Temp Source Heart Rate Source Patient Position - Orthostatic VS BP Location FiO2 (%)   Temporal Monitor Sitting Right arm --      Pain Score       10 - Worst Possible Pain           Vitals:    09/24/22 0930   BP: 145/70   Pulse: 87   Patient Position - Orthostatic VS: Sitting         Visual Acuity      ED Medications  Medications - No data to display    Diagnostic Studies  Results Reviewed     None                 XR knee 4+ views left injury    (Results Pending) Procedures  Procedures         ED Course                               SBIRT 20yo+    Flowsheet Row Most Recent Value   SBIRT (23 yo +)    In order to provide better care to our patients, we are screening all of our patients for alcohol and drug use  Would it be okay to ask you these screening questions? No Filed at: 09/24/2022 0951                    [facility] [place]  Number of Diagnoses or Management Options  Diagnosis management comments: Patient given thorough education regarding dependent edema as well as ruptured Baker's cyst   No signs of cellulitis, compartments are soft  Patient has concern for left-sided knee pain that began a few days ago  Will x-ray  Highly encouraged patient to obtain adequate compression stockings given education regarding on how to use as well as appropriate elevation  Patient ambulatory in the ED without any difficulty  Patient relays that he will be picking up some compression stockings and will elevate lower extremities above heart       Patient is informed to return to the emergency department for worsening of symptoms and was given proper education regarding their diagnosis and symptoms  Otherwise the patient is informed to follow up with their primary care doctor for re-evaluation  The patient verbalizes understanding and agrees with above assessment and plan  All questions were answered  Please Note: Fluency Direct voice recognition software may have been used in the creation of this document  Wrong words or sound a like substitutions may have occurred due to the inherent limitations of the voice software             Amount and/or Complexity of Data Reviewed  Clinical lab tests: reviewed  Tests in the radiology section of CPT®: reviewed  Review and summarize past medical records: yes  Independent visualization of images, tracings, or specimens: yes        Disposition  Final diagnoses:   Bilateral lower extremity edema   Ruptured Bakers cyst     Time reflects when diagnosis was documented in both MDM as applicable and the Disposition within this note     Time User Action Codes Description Comment    9/24/2022 11:12 AM Aleda Akosua Add [R60 0] Bilateral lower extremity edema     9/24/2022 11:12 AM Aleda Akosua Add [M66 0] Ruptured Bakers cyst       ED Disposition     ED Disposition   Discharge    Condition   Stable    Date/Time   Sat Sep 24, 2022 11:12 AM    Comment   Tiffany Liu discharge to home/self care  Follow-up Information     Follow up With Specialties Details Why Contact Info Additional Information    [facility] Emergency Medicine Go to  If symptoms worsen severe pain, spreading redness, fevers, chest pain or shortness of breath, otherwise please follow up with your family doctor [address] [place] [address] [address] [address] [address] [address] Emergency Department, [place], [place], [address]          Patient's Medications   Discharge Prescriptions    No medications on file       No discharge procedures on file      PDMP Review     None          ED Provider  Electronically Signed by           Huan Dick PA-C  09/24/22 2345

## 2022-10-06 ENCOUNTER — OFFICE VISIT (OUTPATIENT)
Dept: OBGYN CLINIC | Facility: CLINIC | Age: 72
End: 2022-10-06
Payer: MEDICARE

## 2022-10-06 VITALS
WEIGHT: 289.6 LBS | SYSTOLIC BLOOD PRESSURE: 153 MMHG | HEIGHT: 66 IN | BODY MASS INDEX: 46.54 KG/M2 | HEART RATE: 82 BPM | TEMPERATURE: 98 F | DIASTOLIC BLOOD PRESSURE: 82 MMHG

## 2022-10-06 DIAGNOSIS — M17.12 PRIMARY OSTEOARTHRITIS OF LEFT KNEE: Primary | ICD-10-CM

## 2022-10-06 PROCEDURE — 99203 OFFICE O/P NEW LOW 30 MIN: CPT | Performed by: ORTHOPAEDIC SURGERY

## 2022-10-06 PROCEDURE — 20610 DRAIN/INJ JOINT/BURSA W/O US: CPT | Performed by: ORTHOPAEDIC SURGERY

## 2022-10-06 RX ORDER — METHYLPREDNISOLONE ACETATE 80 MG/ML
0.5 INJECTION, SUSPENSION INTRA-ARTICULAR; INTRALESIONAL; INTRAMUSCULAR; SOFT TISSUE
Status: COMPLETED | OUTPATIENT
Start: 2022-10-06 | End: 2022-10-06

## 2022-10-06 RX ORDER — LIDOCAINE HYDROCHLORIDE 10 MG/ML
2 INJECTION, SOLUTION INFILTRATION; PERINEURAL
Status: COMPLETED | OUTPATIENT
Start: 2022-10-06 | End: 2022-10-06

## 2022-10-06 RX ADMIN — METHYLPREDNISOLONE ACETATE 0.5 ML: 80 INJECTION, SUSPENSION INTRA-ARTICULAR; INTRALESIONAL; INTRAMUSCULAR; SOFT TISSUE at 14:17

## 2022-10-06 RX ADMIN — LIDOCAINE HYDROCHLORIDE 2 ML: 10 INJECTION, SOLUTION INFILTRATION; PERINEURAL at 14:17

## 2022-10-06 NOTE — PROGRESS NOTES
Orthopaedics Office Visit - New Patient Visit    ASSESSMENT/PLAN:    Assessment:   Left knee OA  Bakers cyst    Plan:   WBAT LLE  CSI administered today, tolerated well  Followup in 4 wks or call sooner if still hurting  May require aspiration of bakers cyst    To Do Next Visit:  4 wk recheck    _____________________________________________________  CHIEF COMPLAINT:  Chief Complaint   Patient presents with    Left Knee - Pain         SUBJECTIVE:  Emre Whittington is a 67 y o  male who presents with a few weeks of L knee pain and a finding of a bakers cyst from duplex doppler done in the ED  He has particular difficulty with standing from a seated position and with bending down  His pain is all localized to anteromedial aspect of the knee  Does not complain of pain in posterior aspect of knee  Denies motor or sensory deficits  PAST MEDICAL HISTORY:  Past Medical History:   Diagnosis Date    Cardiac disease     atrial fib,pacemaker    Hyperlipidemia     Hypertension        PAST SURGICAL HISTORY:  History reviewed  No pertinent surgical history  FAMILY HISTORY:  History reviewed  No pertinent family history      SOCIAL HISTORY:  Social History     Tobacco Use    Smoking status: Former Smoker    Smokeless tobacco: Never Used   Vaping Use    Vaping Use: Never used   Substance Use Topics    Alcohol use: Yes     Comment: occas    Drug use: Never       MEDICATIONS:    Current Outpatient Medications:     alfuzosin (UROXATRAL) 10 mg 24 hr tablet, Take 10 mg by mouth daily, Disp: , Rfl:     apixaban (ELIQUIS) 5 mg, Take 5 mg by mouth 2 (two) times a day, Disp: , Rfl:     diltiazem (CARDIZEM) 120 MG tablet, Take 120 mg by mouth daily, Disp: , Rfl:     metoprolol tartrate (LOPRESSOR) 50 mg tablet, Take 25 mg by mouth every 12 (twelve) hours, Disp: , Rfl:     omeprazole (PriLOSEC) 40 MG capsule, Take 40 mg by mouth daily, Disp: , Rfl:     simvastatin (ZOCOR) 40 mg tablet, Take 40 mg by mouth daily at bedtime, Disp: , Rfl:     ALLERGIES:  Allergies   Allergen Reactions    Penicillins        REVIEW OF SYSTEMS:  MSK: left knee pain  Neuro: negative  Pertinent items are otherwise noted in HPI  A comprehensive review of systems was otherwise negative  LABS:  HgA1c: No results found for: HGBA1C  BMP:   Lab Results   Component Value Date    CALCIUM 8 3 09/07/2022    K 3 9 09/07/2022    CO2 28 09/07/2022     09/07/2022    BUN 20 09/07/2022    CREATININE 1 09 09/07/2022     CBC: No components found for: CBC    _____________________________________________________  PHYSICAL EXAMINATION:  Vital signs: /82   Pulse 82   Temp 98 °F (36 7 °C)   Ht 5' 6" (1 676 m)   Wt 131 kg (289 lb 9 6 oz)   BMI 46 74 kg/m²   General: No acute distress, awake and alert  Psychiatric: Mood and affect appear appropriate  HEENT: Trachea Midline, No torticollis, no apparent facial trauma  Cardiovascular: No audible murmurs; Extremities appear perfused  Pulmonary: No audible wheezing or stridor  Skin: No open lesions; see further details (if any) below    MUSCULOSKELETAL EXAMINATION:  Extremities:  LLE:  Medial joint line tenderness  + ankle df/pf, ehl/fhl motor function  Extremity edema present  Extremity warm/perfused      _____________________________________________________  STUDIES REVIEWED:  I personally reviewed the images and interpretation is as follows:  Plain films of L knee reveal medial joint space narrowing and patellofemoral narrowing    PROCEDURES PERFORMED:  Large joint arthrocentesis: L knee  Universal Protocol:  Consent: Verbal consent obtained    Consent given by: patient  Patient identity confirmed: verbally with patient    Supporting Documentation  Indications: pain   Procedure Details  Location: knee - L knee  Needle size: 22 G  Approach: anteromedial  Medications administered: 2 mL lidocaine 1 %; 0 5 mL methylPREDNISolone acetate 80 mg/mL    Patient tolerance: patient tolerated the procedure well with no immediate complications  Dressing:  Sterile dressing applied          Moshe Gallegos

## 2022-10-20 ENCOUNTER — OFFICE VISIT (OUTPATIENT)
Dept: OBGYN CLINIC | Facility: CLINIC | Age: 72
End: 2022-10-20
Payer: MEDICARE

## 2022-10-20 VITALS
DIASTOLIC BLOOD PRESSURE: 85 MMHG | HEART RATE: 92 BPM | TEMPERATURE: 98.2 F | WEIGHT: 288.6 LBS | HEIGHT: 66 IN | SYSTOLIC BLOOD PRESSURE: 174 MMHG | BODY MASS INDEX: 46.38 KG/M2

## 2022-10-20 DIAGNOSIS — M25.462 EFFUSION OF LEFT KNEE: ICD-10-CM

## 2022-10-20 DIAGNOSIS — M17.12 PRIMARY OSTEOARTHRITIS OF LEFT KNEE: Primary | ICD-10-CM

## 2022-10-20 PROCEDURE — 20610 DRAIN/INJ JOINT/BURSA W/O US: CPT | Performed by: ORTHOPAEDIC SURGERY

## 2022-10-20 PROCEDURE — 99214 OFFICE O/P EST MOD 30 MIN: CPT | Performed by: ORTHOPAEDIC SURGERY

## 2022-10-20 RX ORDER — LIDOCAINE HYDROCHLORIDE 10 MG/ML
8 INJECTION, SOLUTION INFILTRATION; PERINEURAL
Status: COMPLETED | OUTPATIENT
Start: 2022-10-20 | End: 2022-10-20

## 2022-10-20 RX ORDER — KETOROLAC TROMETHAMINE 30 MG/ML
60 INJECTION, SOLUTION INTRAMUSCULAR; INTRAVENOUS
Status: COMPLETED | OUTPATIENT
Start: 2022-10-20 | End: 2022-10-20

## 2022-10-20 RX ADMIN — KETOROLAC TROMETHAMINE 60 MG: 30 INJECTION, SOLUTION INTRAMUSCULAR; INTRAVENOUS at 09:27

## 2022-10-20 RX ADMIN — LIDOCAINE HYDROCHLORIDE 8 ML: 10 INJECTION, SOLUTION INFILTRATION; PERINEURAL at 09:27

## 2022-10-20 NOTE — PATIENT INSTRUCTIONS
- Discussed conservative treatment with patient at length  - aspiration and Toradol injection performed a bedside    Patient tolerated well  - Patient will begin to maintain hinged knee brace as directed  - discussed possibility of Visco injections in future pending symptoms  - Over the counter analgesics as needed / directed   - Ice / heat as directed   - Follow up 6 weeks

## 2022-10-20 NOTE — PROGRESS NOTES
Orthopaedics Office Visit - Follow up Patient Visit    ASSESSMENT/PLAN:    Assessment:   Left knee osteoarthritis, left knee effusion      Plan:   - Discussed conservative treatment with patient at length  - aspiration and Toradol injection performed a bedside  Patient tolerated well  - Patient will begin to maintain hinged knee brace as directed  - discussed possibility of Visco injections in future pending symptoms  Hinged knee brace provided  - Over the counter analgesics as needed / directed   - Ice / heat as directed   - Follow up 6 weeks       To Do Next Visit:   evaluate knee pain    _____________________________________________________  CHIEF COMPLAINT:  Chief Complaint   Patient presents with   • Left Knee - Follow-up         SUBJECTIVE:  Sayra Gill is a 67 y o  male who presents to the office 4 weeks status post a cortisone injection to the left knee  Patient states that he continues to have medial-sided pain becomes worse with range of motion of the weight-bearing  Patient states he received minimal relief of symptoms from the cortisone injection  The patient states that he has knee feels slightly swollen in comparison to previous examination  Patient denies any numbness or tingling leg  Patient offers no other complaints at this time  PAST MEDICAL HISTORY:  Past Medical History:   Diagnosis Date   • Cardiac disease     atrial fib,pacemaker   • Hyperlipidemia    • Hypertension        PAST SURGICAL HISTORY:  History reviewed  No pertinent surgical history  FAMILY HISTORY:  History reviewed  No pertinent family history      SOCIAL HISTORY:  Social History     Tobacco Use   • Smoking status: Former Smoker   • Smokeless tobacco: Never Used   Vaping Use   • Vaping Use: Never used   Substance Use Topics   • Alcohol use: Yes     Comment: occas   • Drug use: Never       MEDICATIONS:    Current Outpatient Medications:   •  alfuzosin (UROXATRAL) 10 mg 24 hr tablet, Take 10 mg by mouth daily, Disp: , Rfl:   •  apixaban (ELIQUIS) 5 mg, Take 5 mg by mouth 2 (two) times a day, Disp: , Rfl:   •  diltiazem (CARDIZEM) 120 MG tablet, Take 120 mg by mouth daily, Disp: , Rfl:   •  metoprolol tartrate (LOPRESSOR) 50 mg tablet, Take 25 mg by mouth every 12 (twelve) hours, Disp: , Rfl:   •  omeprazole (PriLOSEC) 40 MG capsule, Take 40 mg by mouth daily, Disp: , Rfl:   •  simvastatin (ZOCOR) 40 mg tablet, Take 40 mg by mouth daily at bedtime, Disp: , Rfl:     ALLERGIES:  Allergies   Allergen Reactions   • Penicillins        REVIEW OF SYSTEMS:  MSK: left knee pain   Neuro: WNL   Pertinent items are otherwise noted in HPI  A comprehensive review of systems was otherwise negative  LABS:  HgA1c: No results found for: HGBA1C  BMP:   Lab Results   Component Value Date    CALCIUM 8 3 09/07/2022    K 3 9 09/07/2022    CO2 28 09/07/2022     09/07/2022    BUN 20 09/07/2022    CREATININE 1 09 09/07/2022     CBC: No components found for: CBC    _____________________________________________________  PHYSICAL EXAMINATION:  Vital signs: BP (!) 174/85   Pulse 92   Temp 98 2 °F (36 8 °C)   Ht 5' 6" (1 676 m)   Wt 131 kg (288 lb 9 6 oz)   BMI 46 58 kg/m²   General: No acute distress, awake and alert  Psychiatric: Mood and affect appear appropriate  HEENT: Trachea Midline, No torticollis, no apparent facial trauma  Cardiovascular: No audible murmurs; Extremities appear perfused  Pulmonary: No audible wheezing or stridor  Skin: No open lesions; see further details (if any) below      MUSCULOSKELETAL EXAMINATION:  Left knee examination:  - Patient sitting comfortably in the office in no acute distress   - a moderate-size effusion present in the left knee with no erythema or ecchymosis present  - tenderness palpation noted over the medial joint line  No other bony or soft tissue tenderness palpation noted at this time    - 0-85 degrees range of motion limited by pain  - NV intact      _____________________________________________________  STUDIES REVIEWED:  I personally reviewed the images and interpretation is as follows:  N/A       PROCEDURES PERFORMED:  Large joint arthrocentesis: L knee  Universal Protocol:  Consent: Verbal consent obtained    Risks and benefits: risks, benefits and alternatives were discussed  Consent given by: patient  Patient understanding: patient states understanding of the procedure being performed  Site marked: the operative site was marked  Patient identity confirmed: verbally with patient    Supporting Documentation  Indications: pain   Procedure Details  Location: knee - L knee  Preparation: Patient was prepped and draped in the usual sterile fashion  Needle size: 22 G  Ultrasound guidance: no  Approach: superior  Medications administered: 8 mL lidocaine 1 %; 60 mg ketorolac 60 mg/2 mL    Aspirate amount: 40 mL  Aspirate: serous and yellow    Patient tolerance: patient tolerated the procedure well with no immediate complications  Dressing:  Sterile dressing applied              [address]

## 2022-10-27 ENCOUNTER — OFFICE VISIT (OUTPATIENT)
Dept: OBGYN CLINIC | Facility: CLINIC | Age: 72
End: 2022-10-27
Payer: MEDICARE

## 2022-10-27 VITALS
HEART RATE: 65 BPM | SYSTOLIC BLOOD PRESSURE: 148 MMHG | WEIGHT: 287.5 LBS | BODY MASS INDEX: 46.21 KG/M2 | TEMPERATURE: 98.5 F | HEIGHT: 66 IN | DIASTOLIC BLOOD PRESSURE: 76 MMHG

## 2022-10-27 DIAGNOSIS — M25.462 EFFUSION OF LEFT KNEE: Primary | ICD-10-CM

## 2022-10-27 PROCEDURE — 99213 OFFICE O/P EST LOW 20 MIN: CPT | Performed by: ORTHOPAEDIC SURGERY

## 2022-10-27 RX ORDER — SENNOSIDES 8.6 MG
650 CAPSULE ORAL EVERY 8 HOURS PRN
Qty: 30 TABLET | Refills: 0 | Status: SHIPPED | OUTPATIENT
Start: 2022-10-27

## 2022-10-27 NOTE — PROGRESS NOTES
Orthopaedics Office Visit - Follow up Patient Visit    ASSESSMENT/PLAN:    Assessment:   Left knee osteoarthritis, left knee effusion  Suspect medial meniscus tear  Persistent medial sided pain      Plan:   Patient now has pain refractory to two aspirations and injections, brace usage, home exercises that he has been performing for over 4 wks, oral medications including tylenol  Patient unable to take NSAIDs secondary to eliquis usage  MRI of L knee ordered to evaluate for internal pathology  Pain is causing patient to require a cane for ambulation  Suspected medial meniscus tear  - Over the counter analgesics as needed / directed   - Ice / heat as directed   - Follow up after MRI      To Do Next Visit:  Discuss MRI    _____________________________________________________  CHIEF COMPLAINT:  Chief Complaint   Patient presents with   • Left Knee - Follow-up         SUBJECTIVE:  Milvia Silva is a 67 y o  male who presents to the office 1 week status post a repeat aspiration and toradol injection to the left knee  Patient states that he continues to have medial-sided pain becomes worse with range of motion of the weight-bearing  Patient states he received minimal relief of symptoms from the cortisone injection  The patient states that he has knee feels slightly swollen in comparison to previous examination  Patient denies any numbness or tingling leg  Patient offers no other complaints at this time  Brace has not relieved pain and tylenol does not provide significant relief      PAST MEDICAL HISTORY:  Past Medical History:   Diagnosis Date   • Cardiac disease     atrial fib,pacemaker   • Hyperlipidemia    • Hypertension        PAST SURGICAL HISTORY:  History reviewed  No pertinent surgical history  FAMILY HISTORY:  History reviewed  No pertinent family history      SOCIAL HISTORY:  Social History     Tobacco Use   • Smoking status: Former Smoker   • Smokeless tobacco: Never Used   Vaping Use   • Vaping Use: Never used   Substance Use Topics   • Alcohol use: Yes     Comment: occas   • Drug use: Never       MEDICATIONS:    Current Outpatient Medications:   •  acetaminophen (TYLENOL) 650 mg CR tablet, Take 1 tablet (650 mg total) by mouth every 8 (eight) hours as needed for mild pain, Disp: 30 tablet, Rfl: 0  •  alfuzosin (UROXATRAL) 10 mg 24 hr tablet, Take 10 mg by mouth daily, Disp: , Rfl:   •  apixaban (ELIQUIS) 5 mg, Take 5 mg by mouth 2 (two) times a day, Disp: , Rfl:   •  diltiazem (CARDIZEM) 120 MG tablet, Take 120 mg by mouth daily, Disp: , Rfl:   •  metoprolol tartrate (LOPRESSOR) 50 mg tablet, Take 25 mg by mouth every 12 (twelve) hours, Disp: , Rfl:   •  omeprazole (PriLOSEC) 40 MG capsule, Take 40 mg by mouth daily, Disp: , Rfl:   •  simvastatin (ZOCOR) 40 mg tablet, Take 40 mg by mouth daily at bedtime, Disp: , Rfl:     ALLERGIES:  Allergies   Allergen Reactions   • Penicillins        REVIEW OF SYSTEMS:  MSK: left knee pain   Neuro: WNL   Pertinent items are otherwise noted in HPI  A comprehensive review of systems was otherwise negative  LABS:  HgA1c: No results found for: HGBA1C  BMP:   Lab Results   Component Value Date    CALCIUM 8 3 09/07/2022    K 3 9 09/07/2022    CO2 28 09/07/2022     09/07/2022    BUN 20 09/07/2022    CREATININE 1 09 09/07/2022     CBC: No components found for: CBC    _____________________________________________________  PHYSICAL EXAMINATION:  Vital signs: /76   Pulse 65   Temp 98 5 °F (36 9 °C)   Ht 5' 6" (1 676 m)   Wt 130 kg (287 lb 8 oz)   BMI 46 40 kg/m²   General: No acute distress, awake and alert  Psychiatric: Mood and affect appear appropriate  HEENT: Trachea Midline, No torticollis, no apparent facial trauma  Cardiovascular: No audible murmurs;  Extremities appear perfused  Pulmonary: No audible wheezing or stridor  Skin: No open lesions; see further details (if any) below      MUSCULOSKELETAL EXAMINATION:  Left knee examination:  - Patient sitting comfortably in the office in no acute distress   - mild effusion but improved from last week  - tenderness palpation noted over the medial joint line  No other bony or soft tissue tenderness palpation noted at this time    - 0-85 degrees range of motion limited by pain  - NV intact      _____________________________________________________  STUDIES REVIEWED:  I personally reviewed the images and interpretation is as follows:  N/A       PROCEDURES PERFORMED:  Procedures          Enrique Burnham

## 2022-11-22 ENCOUNTER — TELEPHONE (OUTPATIENT)
Dept: OBGYN CLINIC | Facility: MEDICAL CENTER | Age: 72
End: 2022-11-22

## 2022-11-22 DIAGNOSIS — M17.12 PRIMARY OSTEOARTHRITIS OF LEFT KNEE: Primary | ICD-10-CM

## 2022-11-22 DIAGNOSIS — M25.462 EFFUSION OF LEFT KNEE: ICD-10-CM

## 2022-11-22 NOTE — TELEPHONE ENCOUNTER
Caller: Gregorio Gray at [facility]    Doctor: Angela Dunlap    Reason for call:     Gregorio Gray calling to inform dr that the MRI for left knee was not safe to do today because of a loose lead in pacemaker  She is asking if there is another alternative form of imaging or ultrasound that can be performed instead  Cardiologist was made aware of loose lead  Can the patient be made aware of this as well      Call back#: Minerva's #  [id number]   And patients #  [phone]

## 2022-11-22 NOTE — TELEPHONE ENCOUNTER
I called patient and helped him schedule his CT   He will be going to [facility] 12/6/2022 10:15 arrival 10:00 AM

## 2022-12-06 ENCOUNTER — HOSPITAL ENCOUNTER (OUTPATIENT)
Dept: RADIOLOGY | Facility: HOSPITAL | Age: 72
Discharge: HOME | End: 2022-12-06

## 2022-12-06 DIAGNOSIS — M25.462 EFFUSION OF LEFT KNEE: ICD-10-CM

## 2022-12-06 DIAGNOSIS — M17.12 PRIMARY OSTEOARTHRITIS OF LEFT KNEE: ICD-10-CM

## 2022-12-06 RX ORDER — LIDOCAINE HYDROCHLORIDE 10 MG/ML
5 INJECTION, SOLUTION EPIDURAL; INFILTRATION; INTRACAUDAL; PERINEURAL
Status: COMPLETED | OUTPATIENT
Start: 2022-12-06 | End: 2022-12-06

## 2022-12-06 RX ADMIN — IOHEXOL 21 ML: 240 INJECTION, SOLUTION INTRATHECAL; INTRAVASCULAR; INTRAVENOUS; ORAL at 11:37

## 2022-12-06 RX ADMIN — LIDOCAINE HYDROCHLORIDE 5 ML: 10 INJECTION, SOLUTION EPIDURAL; INFILTRATION; INTRACAUDAL; PERINEURAL at 11:37

## 2022-12-08 ENCOUNTER — OFFICE VISIT (OUTPATIENT)
Dept: OBGYN CLINIC | Facility: CLINIC | Age: 72
End: 2022-12-08

## 2022-12-08 VITALS
HEIGHT: 66 IN | DIASTOLIC BLOOD PRESSURE: 72 MMHG | BODY MASS INDEX: 47.76 KG/M2 | WEIGHT: 297.2 LBS | TEMPERATURE: 98.3 F | SYSTOLIC BLOOD PRESSURE: 138 MMHG | HEART RATE: 79 BPM

## 2022-12-08 DIAGNOSIS — M17.12 PRIMARY OSTEOARTHRITIS OF LEFT KNEE: ICD-10-CM

## 2022-12-08 DIAGNOSIS — S83.232D COMPLEX TEAR OF MEDIAL MENISCUS OF LEFT KNEE AS CURRENT INJURY, SUBSEQUENT ENCOUNTER: Primary | ICD-10-CM

## 2022-12-08 PROBLEM — S83.232A COMPLEX TEAR OF MEDIAL MENISCUS OF LEFT KNEE AS CURRENT INJURY: Status: ACTIVE | Noted: 2022-12-08

## 2022-12-08 NOTE — PROGRESS NOTES
Orthopaedics Office Visit - Follow up Patient Visit    ASSESSMENT/PLAN:    Assessment:   Left knee osteoarthritis, and complex medial meniscus tear      Plan:   CT arthrogram reviewed demonstrates complex medial meniscus tear  A referral to Dr Joan Bianchi to discuss arthroscopic surgery vs further treatment for arthritis vs TKA    Continue use of over the counter analgesics as needed      _____________________________________________________  CHIEF COMPLAINT:  Chief Complaint   Patient presents with   • Left Knee - Follow-up         SUBJECTIVE:  Kumar Martinez is a 67 y o  male who presents to the office for repeat evaluation of left knee and CT arthrogram review  CT demonstrates complex medial meniscus tear of the left knee  Patient states that previous corticosteroid injection has provided him with some relief  He denies any effusion today  He continues to use a cane for ambulation  Patient denies any numbness or tingling to the leg  Patient offers no other complaints at this time  PAST MEDICAL HISTORY:  Past Medical History:   Diagnosis Date   • Cardiac disease     atrial fib,pacemaker   • Hyperlipidemia    • Hypertension        PAST SURGICAL HISTORY:  Past Surgical History:   Procedure Laterality Date   • FL INJECTION LEFT KNEE (ARTHROGRAM)  12/6/2022       FAMILY HISTORY:  History reviewed  No pertinent family history      SOCIAL HISTORY:  Social History     Tobacco Use   • Smoking status: Former   • Smokeless tobacco: Never   Vaping Use   • Vaping Use: Never used   Substance Use Topics   • Alcohol use: Yes     Comment: occas   • Drug use: Never       MEDICATIONS:    Current Outpatient Medications:   •  acetaminophen (TYLENOL) 650 mg CR tablet, Take 1 tablet (650 mg total) by mouth every 8 (eight) hours as needed for mild pain, Disp: 30 tablet, Rfl: 0  •  alfuzosin (UROXATRAL) 10 mg 24 hr tablet, Take 10 mg by mouth daily, Disp: , Rfl:   •  apixaban (ELIQUIS) 5 mg, Take 5 mg by mouth 2 (two) times a day, Disp: , Rfl:   •  diltiazem (CARDIZEM) 120 MG tablet, Take 120 mg by mouth daily, Disp: , Rfl:   •  metoprolol tartrate (LOPRESSOR) 50 mg tablet, Take 25 mg by mouth every 12 (twelve) hours, Disp: , Rfl:   •  omeprazole (PriLOSEC) 40 MG capsule, Take 40 mg by mouth daily, Disp: , Rfl:   •  simvastatin (ZOCOR) 40 mg tablet, Take 40 mg by mouth daily at bedtime, Disp: , Rfl:     ALLERGIES:  Allergies   Allergen Reactions   • Penicillins        REVIEW OF SYSTEMS:  MSK: left knee pain   Neuro: WNL   Pertinent items are otherwise noted in HPI  A comprehensive review of systems was otherwise negative  LABS:  HgA1c: No results found for: HGBA1C  BMP:   Lab Results   Component Value Date    CALCIUM 8 3 09/07/2022    K 3 9 09/07/2022    CO2 28 09/07/2022     09/07/2022    BUN 20 09/07/2022    CREATININE 1 09 09/07/2022     CBC: No components found for: CBC    _____________________________________________________  PHYSICAL EXAMINATION:  Vital signs: /72   Pulse 79   Temp 98 3 °F (36 8 °C)   Ht 5' 6" (1 676 m)   Wt 135 kg (297 lb 3 2 oz)   BMI 47 97 kg/m²   General: No acute distress, awake and alert  Psychiatric: Mood and affect appear appropriate  HEENT: Trachea Midline, No torticollis, no apparent facial trauma  Cardiovascular: No audible murmurs; Extremities appear perfused  Pulmonary: No audible wheezing or stridor  Skin: No open lesions; see further details (if any) below      MUSCULOSKELETAL EXAMINATION:  Left knee examination:  - Patient sitting comfortably in the office in no acute distress   - tenderness palpation noted over the medial joint line  No other bony or soft tissue tenderness palpation noted at this time    - 0-85 degrees range of motion limited by pain  - NV intact      _____________________________________________________  STUDIES REVIEWED:  I personally reviewed the images and interpretation is as follows:  CT scan of left knee 12/6/2022 demonstrates a complex tear of the anterior horn and anterior root of the medial meniscus      PROCEDURES PERFORMED:  Procedures    Scribe Attestation    I,:  Herminio Posada am acting as a scribe while in the presence of the attending physician :       I,:  Natalie Begum MD personally performed the services described in this documentation    as scribed in my presence :

## 2022-12-21 ENCOUNTER — OFFICE VISIT (OUTPATIENT)
Dept: OBGYN CLINIC | Facility: CLINIC | Age: 72
End: 2022-12-21

## 2022-12-21 VITALS
SYSTOLIC BLOOD PRESSURE: 127 MMHG | BODY MASS INDEX: 47.76 KG/M2 | HEART RATE: 74 BPM | DIASTOLIC BLOOD PRESSURE: 72 MMHG | WEIGHT: 297.2 LBS | HEIGHT: 66 IN

## 2022-12-21 DIAGNOSIS — S83.232D COMPLEX TEAR OF MEDIAL MENISCUS OF LEFT KNEE AS CURRENT INJURY, SUBSEQUENT ENCOUNTER: ICD-10-CM

## 2022-12-21 DIAGNOSIS — M17.12 PRIMARY OSTEOARTHRITIS OF LEFT KNEE: Primary | ICD-10-CM

## 2022-12-21 RX ORDER — FOLIC ACID 1 MG/1
TABLET ORAL
COMMUNITY
Start: 2022-10-13

## 2022-12-21 RX ORDER — METHOTREXATE 10 MG/1
TABLET, FILM COATED ORAL
COMMUNITY
Start: 2022-12-18

## 2022-12-21 RX ORDER — ROPINIROLE 3 MG/1
TABLET, FILM COATED ORAL
COMMUNITY
Start: 2022-09-25

## 2022-12-21 RX ORDER — FINASTERIDE 5 MG/1
TABLET, FILM COATED ORAL
COMMUNITY
Start: 2022-10-20

## 2022-12-21 RX ORDER — AMIODARONE HYDROCHLORIDE 200 MG/1
TABLET ORAL
COMMUNITY
Start: 2022-10-20

## 2022-12-21 RX ORDER — FUROSEMIDE 40 MG/1
TABLET ORAL
COMMUNITY
Start: 2022-10-20

## 2022-12-21 NOTE — PROGRESS NOTES
Assessment/Plan:  1  Primary osteoarthritis of left knee  Injection Procedure Prior Authorization      2  Complex tear of medial meniscus of left knee as current injury, subsequent encounter  Ambulatory Referral to Orthopedic Surgery        Scribe Attestation    I,:  Russ Ricketts am acting as a scribe while in the presence of the attending physician :       I,:  Nelida Scheuermann, MD personally performed the services described in this documentation    as scribed in my presence :             Alo and I reviewed his CT arthrogram together  The CT arthrogram reveals significant arthritis in the medial compartment  I explained to Mynor Sommers that this is the driving factor contributing to his knee pain  He has received 2 steroid injections with no relief  He expresses a lack of interest in treatment from physical therapy  At this point I recommend joint lubricating injections  We discussed the series of 3 injections  He will receive 1 injection/week over 3-week period  An order for prior authorization was placed today  He will be contacted by central scheduling once the injections have been approved by his insurance company  If he does not find relief from the Euflexxa injections he may be a candidate for a total knee replacement though his comorbidities would likely exclude him  I will see him back once the injections have been approved  Subjective:   Wellington Mendez is a 67 y o  male who presents to the office for evaluation of his left knee  Ronnell Lovelace has a known history of left knee osteoarthritis and a complex medial meniscus tear confirmed by CT arthrogram   Mynor Sommers is a previous patient of Dr Tavares Rivera has aspirated the knee on 2 occasions as well as performed steroid injections  Unfortunately, this has not provided relief  Alo's chief complaint is of persistent pain in the medial aspect of the left knee    He states bearing weight on the left lower extremity can cause the pain to become more sharp and stabbing  He will use a cane for assistance with ambulation  This began in October  He states while visiting a friend in [place] he simply awoke in the morning and had a sharp pain about the medial aspect of the knee  Jose Vargas states he has also fallen on 4 separate occasions since October  He feels his last fall injured in the knee significantly  He developed significant swelling in his left lower extremity  He was seen by his cardiologist who recommended orthopedic evaluation  Jose Vargas states elevating his leg will provide relief  Review of Systems   Constitutional: Negative for chills, fever and unexpected weight change  HENT: Negative for hearing loss, nosebleeds and sore throat  Eyes: Negative for pain, redness and visual disturbance  Respiratory: Negative for cough, shortness of breath and wheezing  Cardiovascular: Negative for chest pain, palpitations and leg swelling  Gastrointestinal: Negative for abdominal pain, nausea and vomiting  Endocrine: Negative for polyphagia and polyuria  Genitourinary: Negative for dysuria and hematuria  Musculoskeletal:        See HPI   Skin: Negative for rash and wound  Neurological: Negative for dizziness, numbness and headaches  Psychiatric/Behavioral: Negative for decreased concentration and suicidal ideas  The patient is not nervous/anxious  Past Medical History:   Diagnosis Date   • Cardiac disease     atrial fib,pacemaker   • Hyperlipidemia    • Hypertension        Past Surgical History:   Procedure Laterality Date   • FL INJECTION LEFT KNEE (ARTHROGRAM)  12/6/2022       History reviewed  No pertinent family history      Social History     Occupational History   • Not on file   Tobacco Use   • Smoking status: Former   • Smokeless tobacco: Never   Vaping Use   • Vaping Use: Never used   Substance and Sexual Activity   • Alcohol use: Yes     Comment: occas   • Drug use: Never   • Sexual activity: Not on file         Current Outpatient Medications:   •  acetaminophen (TYLENOL) 650 mg CR tablet, Take 1 tablet (650 mg total) by mouth every 8 (eight) hours as needed for mild pain, Disp: 30 tablet, Rfl: 0  •  alfuzosin (UROXATRAL) 10 mg 24 hr tablet, Take 10 mg by mouth daily, Disp: , Rfl:   •  apixaban (ELIQUIS) 5 mg, Take 5 mg by mouth 2 (two) times a day, Disp: , Rfl:   •  diltiazem (CARDIZEM) 120 MG tablet, Take 120 mg by mouth daily, Disp: , Rfl:   •  metoprolol tartrate (LOPRESSOR) 50 mg tablet, Take 25 mg by mouth every 12 (twelve) hours, Disp: , Rfl:   •  omeprazole (PriLOSEC) 40 MG capsule, Take 40 mg by mouth daily, Disp: , Rfl:   •  simvastatin (ZOCOR) 40 mg tablet, Take 40 mg by mouth daily at bedtime, Disp: , Rfl:   •  amiodarone 200 mg tablet, , Disp: , Rfl:   •  finasteride (PROSCAR) 5 mg tablet, , Disp: , Rfl:   •  folic acid (FOLVITE) 1 mg tablet, , Disp: , Rfl:   •  furosemide (LASIX) 40 mg tablet, , Disp: , Rfl:   •  rOPINIRole (REQUIP) 3 mg tablet, , Disp: , Rfl:   •  Trexall 10 MG tablet, , Disp: , Rfl:     Allergies   Allergen Reactions   • Penicillins        Objective:  Vitals:    12/21/22 0940   BP: 127/72   Pulse: 74       Left Knee Exam     Range of Motion   Extension: -5   Flexion: 90     Tests   Varus: negative Valgus: negative  Drawer:  Anterior - negative     Posterior - negative    Other   Erythema: absent  Scars: absent  Swelling: moderate    Comments:  3+ pitting edema            Physical Exam  Vitals reviewed  Constitutional:       Appearance: He is well-developed  HENT:      Head: Normocephalic and atraumatic  Eyes:      General:         Right eye: No discharge  Left eye: No discharge  Conjunctiva/sclera: Conjunctivae normal    Cardiovascular:      Rate and Rhythm: Regular rhythm  Pulmonary:      Effort: Pulmonary effort is normal  No respiratory distress  Musculoskeletal:      Cervical back: Normal range of motion and neck supple  Skin:     General: Skin is warm and dry     Neurological: Mental Status: He is alert and oriented to person, place, and time  Psychiatric:         Behavior: Behavior normal          I have personally reviewed pertinent films in PACS and my interpretation is as follows:  CT arthrogram of the left knee performed on December 6, 2022 ordered by Dr Charlee Garrett was reviewed  There is evidence of grade 3 arthritic changes in the medial compartment  There is evidence of grade 2 arthritic change in the patellofemoral and lateral compartments  There is evidence of a complex tear to the anterior horn of the medial meniscus  This is likely degenerative in nature  This document was created using speech voice recognition software  Grammatical errors, random word insertions, pronoun errors, and incomplete sentences are an occasional consequence of this system due to software limitations, ambient noise, and hardware issues  Any formal questions or concerns about content, text, or information contained within the body of this dictation should be directly addressed to the provider for clarification

## 2023-01-18 ENCOUNTER — PROCEDURE VISIT (OUTPATIENT)
Dept: OBGYN CLINIC | Facility: CLINIC | Age: 73
End: 2023-01-18

## 2023-01-18 VITALS — DIASTOLIC BLOOD PRESSURE: 72 MMHG | SYSTOLIC BLOOD PRESSURE: 145 MMHG | HEART RATE: 85 BPM

## 2023-01-18 DIAGNOSIS — M17.12 PRIMARY OSTEOARTHRITIS OF LEFT KNEE: Primary | ICD-10-CM

## 2023-01-18 RX ORDER — HYALURONATE SODIUM 10 MG/ML
20 SYRINGE (ML) INTRAARTICULAR
Status: COMPLETED | OUTPATIENT
Start: 2023-01-18 | End: 2023-01-18

## 2023-01-18 RX ORDER — DILTIAZEM HYDROCHLORIDE 120 MG/1
CAPSULE, COATED, EXTENDED RELEASE ORAL
COMMUNITY
Start: 2023-01-11

## 2023-01-18 RX ADMIN — Medication 20 MG: at 14:44

## 2023-01-18 NOTE — PROGRESS NOTES
Assessment/Plan:  1  Primary osteoarthritis of left knee  Large joint arthrocentesis: L knee        Scribe Attestation    I,:  Indio Romero am acting as a scribe while in the presence of the attending physician :       I,:  Rudy Kayser, MD personally performed the services described in this documentation    as scribed in my presence :         Trixie Bence tolerated his first Euflexxa injection of the left knee well with no complications  Postinjection instructions were provided  He will follow-up with me in 1 week for his second Euflexxa injection  Large joint arthrocentesis: L knee  Universal Protocol:  Consent: Verbal consent obtained  Risks and benefits: risks, benefits and alternatives were discussed  Consent given by: patient  Timeout called at: 1/18/2023 2:40 PM   Patient understanding: patient states understanding of the procedure being performed  Patient identity confirmed: verbally with patient    Supporting Documentation  Indications: pain   Procedure Details  Location: knee - L knee  Preparation: Patient was prepped and draped in the usual sterile fashion  Needle size: 22 G  Approach: anterolateral  Medications administered: 20 mg Sodium Hyaluronate 20 MG/2ML    Patient tolerance: patient tolerated the procedure well with no immediate complications  Dressing:  Sterile dressing applied          Subjective:   Segundo Wong is a 67 y o  male who presents for his first Euflexxa injection of the left knee  This is to treat underlying osteoarthritis  Review of Systems      Past Medical History:   Diagnosis Date   • Cardiac disease     atrial fib,pacemaker   • Hyperlipidemia    • Hypertension        Past Surgical History:   Procedure Laterality Date   • FL INJECTION LEFT KNEE (ARTHROGRAM)  12/6/2022       History reviewed  No pertinent family history      Social History     Occupational History   • Not on file   Tobacco Use   • Smoking status: Former   • Smokeless tobacco: Never   Vaping Use • Vaping Use: Never used   Substance and Sexual Activity   • Alcohol use: Yes     Comment: occas   • Drug use: Never   • Sexual activity: Not on file         Current Outpatient Medications:   •  acetaminophen (TYLENOL) 650 mg CR tablet, Take 1 tablet (650 mg total) by mouth every 8 (eight) hours as needed for mild pain, Disp: 30 tablet, Rfl: 0  •  alfuzosin (UROXATRAL) 10 mg 24 hr tablet, Take 10 mg by mouth daily, Disp: , Rfl:   •  amiodarone 200 mg tablet, , Disp: , Rfl:   •  apixaban (ELIQUIS) 5 mg, Take 5 mg by mouth 2 (two) times a day, Disp: , Rfl:   •  diltiazem (CARDIZEM CD) 120 mg 24 hr capsule, , Disp: , Rfl:   •  finasteride (PROSCAR) 5 mg tablet, , Disp: , Rfl:   •  folic acid (FOLVITE) 1 mg tablet, , Disp: , Rfl:   •  furosemide (LASIX) 40 mg tablet, , Disp: , Rfl:   •  metoprolol tartrate (LOPRESSOR) 50 mg tablet, Take 25 mg by mouth every 12 (twelve) hours, Disp: , Rfl:   •  omeprazole (PriLOSEC) 40 MG capsule, Take 40 mg by mouth daily, Disp: , Rfl:   •  rOPINIRole (REQUIP) 3 mg tablet, , Disp: , Rfl:   •  simvastatin (ZOCOR) 40 mg tablet, Take 40 mg by mouth daily at bedtime, Disp: , Rfl:   •  Trexall 10 MG tablet, , Disp: , Rfl:     Allergies   Allergen Reactions   • Penicillins        Objective:  Vitals:    01/18/23 1426   BP: 145/72   Pulse: 85       Ortho Exam    Physical Exam    I have personally reviewed pertinent films in PACS and my interpretation is as follows: No images reviewed today      This document was created using speech voice recognition software  Grammatical errors, random word insertions, pronoun errors, and incomplete sentences are an occasional consequence of this system due to software limitations, ambient noise, and hardware issues  Any formal questions or concerns about content, text, or information contained within the body of this dictation should be directly addressed to the provider for clarification

## 2023-01-25 ENCOUNTER — PROCEDURE VISIT (OUTPATIENT)
Dept: OBGYN CLINIC | Facility: CLINIC | Age: 73
End: 2023-01-25

## 2023-01-25 VITALS — SYSTOLIC BLOOD PRESSURE: 138 MMHG | DIASTOLIC BLOOD PRESSURE: 66 MMHG | HEART RATE: 86 BPM

## 2023-01-25 DIAGNOSIS — M17.12 PRIMARY OSTEOARTHRITIS OF LEFT KNEE: Primary | ICD-10-CM

## 2023-01-25 RX ORDER — HYALURONATE SODIUM 10 MG/ML
20 SYRINGE (ML) INTRAARTICULAR
Status: COMPLETED | OUTPATIENT
Start: 2023-01-25 | End: 2023-01-25

## 2023-01-25 RX ADMIN — Medication 20 MG: at 11:48

## 2023-01-25 NOTE — PROGRESS NOTES
Assessment/Plan:  1  Primary osteoarthritis of left knee  Large joint arthrocentesis: L knee        Scribe Attestation    I,:  Melissa Stout am acting as a scribe while in the presence of the attending physician :       I,:  Adeline Marte MD personally performed the services described in this documentation    as scribed in my presence :         Osvaldo Lazaro tolerated his 2nd Euflexxa injection well without complications  Post injection instruction were provided to him today  He will follow up in 1 week for his final left knee Euflexxa injection  Large joint arthrocentesis: L knee  Universal Protocol:  Consent: Verbal consent obtained  Risks and benefits: risks, benefits and alternatives were discussed  Consent given by: patient  Time out: Immediately prior to procedure a "time out" was called to verify the correct patient, procedure, equipment, support staff and site/side marked as required  Timeout called at: 1/25/2023 11:46 AM   Patient understanding: patient states understanding of the procedure being performed  Site marked: the operative site was marked  Patient identity confirmed: verbally with patient    Supporting Documentation  Indications: pain   Procedure Details  Location: knee - L knee  Preparation: Patient was prepped and draped in the usual sterile fashion  Needle size: 22 G  Ultrasound guidance: no  Approach: anterolateral  Medications administered: 20 mg Sodium Hyaluronate 20 MG/2ML    Patient tolerance: patient tolerated the procedure well with no immediate complications  Dressing:  Sterile dressing applied              Subjective:   Lionel Somers is a 67 y o  male who presents for his 2nd left knee Euflexxa injection to treat underlying osteoarthritis  He tolerated his 1st injection well without complications          Review of Systems      Past Medical History:   Diagnosis Date   • Cardiac disease     atrial fib,pacemaker   • Hyperlipidemia    • Hypertension        Past Surgical History: Procedure Laterality Date   • FL INJECTION LEFT KNEE (ARTHROGRAM)  12/6/2022       History reviewed  No pertinent family history  Social History     Occupational History   • Not on file   Tobacco Use   • Smoking status: Former   • Smokeless tobacco: Never   Vaping Use   • Vaping Use: Never used   Substance and Sexual Activity   • Alcohol use: Yes     Comment: occas   • Drug use: Never   • Sexual activity: Not on file         Current Outpatient Medications:   •  acetaminophen (TYLENOL) 650 mg CR tablet, Take 1 tablet (650 mg total) by mouth every 8 (eight) hours as needed for mild pain, Disp: 30 tablet, Rfl: 0  •  alfuzosin (UROXATRAL) 10 mg 24 hr tablet, Take 10 mg by mouth daily, Disp: , Rfl:   •  amiodarone 200 mg tablet, , Disp: , Rfl:   •  apixaban (ELIQUIS) 5 mg, Take 5 mg by mouth 2 (two) times a day, Disp: , Rfl:   •  diltiazem (CARDIZEM CD) 120 mg 24 hr capsule, , Disp: , Rfl:   •  finasteride (PROSCAR) 5 mg tablet, , Disp: , Rfl:   •  folic acid (FOLVITE) 1 mg tablet, , Disp: , Rfl:   •  furosemide (LASIX) 40 mg tablet, , Disp: , Rfl:   •  metoprolol tartrate (LOPRESSOR) 50 mg tablet, Take 25 mg by mouth every 12 (twelve) hours, Disp: , Rfl:   •  omeprazole (PriLOSEC) 40 MG capsule, Take 40 mg by mouth daily, Disp: , Rfl:   •  rOPINIRole (REQUIP) 3 mg tablet, , Disp: , Rfl:   •  simvastatin (ZOCOR) 40 mg tablet, Take 40 mg by mouth daily at bedtime, Disp: , Rfl:   •  Trexall 10 MG tablet, , Disp: , Rfl:     Allergies   Allergen Reactions   • Penicillins        Objective:  Vitals:    01/25/23 1136   BP: 138/66   Pulse: 86       Ortho Exam    Physical Exam          This document was created using speech voice recognition software  Grammatical errors, random word insertions, pronoun errors, and incomplete sentences are an occasional consequence of this system due to software limitations, ambient noise, and hardware issues     Any formal questions or concerns about content, text, or information contained within the body of this dictation should be directly addressed to the provider for clarification

## 2023-02-01 ENCOUNTER — PROCEDURE VISIT (OUTPATIENT)
Dept: OBGYN CLINIC | Facility: CLINIC | Age: 73
End: 2023-02-01

## 2023-02-01 VITALS — DIASTOLIC BLOOD PRESSURE: 67 MMHG | SYSTOLIC BLOOD PRESSURE: 142 MMHG | HEART RATE: 91 BPM

## 2023-02-01 DIAGNOSIS — M17.12 PRIMARY OSTEOARTHRITIS OF LEFT KNEE: Primary | ICD-10-CM

## 2023-02-01 RX ORDER — HYALURONATE SODIUM 10 MG/ML
20 SYRINGE (ML) INTRAARTICULAR
Status: COMPLETED | OUTPATIENT
Start: 2023-02-01 | End: 2023-02-01

## 2023-02-01 RX ADMIN — Medication 20 MG: at 09:58

## 2023-02-01 NOTE — PROGRESS NOTES
Assessment/Plan:  1  Primary osteoarthritis of left knee  Large joint arthrocentesis: L knee        Scribe Attestation    I,:  Yani Chatman MA am acting as a scribe while in the presence of the attending physician :       TITA,:  Nelida Scheuermann, MD personally performed the services described in this documentation    as scribed in my presence :             Ronnell Lovelace consented and underwent his 3rd and finial left knee Euflexxa injection in the office today without any complications  Post injection instructions were provided  He may follow up with me as needed  Large joint arthrocentesis: L knee  Universal Protocol:  Consent: Verbal consent obtained  Consent given by: patient  Patient identity confirmed: verbally with patient    Supporting Documentation  Indications: pain   Procedure Details  Location: knee - L knee  Preparation: Patient was prepped and draped in the usual sterile fashion  Needle size: 22 G  Ultrasound guidance: no  Approach: anteromedial  Medications administered: 20 mg Sodium Hyaluronate 20 MG/2ML    Patient tolerance: patient tolerated the procedure well with no immediate complications  Dressing:  Sterile dressing applied            Subjective:   Wellington Mendez is a 67 y o  male who presents to the office today for his 3rd and final left knee Euflexxa injection to treat his underlying left knee osteoarthritis  He has been tolerating the series well  Review of Systems      Past Medical History:   Diagnosis Date   • Cardiac disease     atrial fib,pacemaker   • Hyperlipidemia    • Hypertension        Past Surgical History:   Procedure Laterality Date   • FL INJECTION LEFT KNEE (ARTHROGRAM)  12/6/2022       History reviewed  No pertinent family history      Social History     Occupational History   • Not on file   Tobacco Use   • Smoking status: Former   • Smokeless tobacco: Never   Vaping Use   • Vaping Use: Never used   Substance and Sexual Activity   • Alcohol use: Yes     Comment: occas   • Drug use: Never   • Sexual activity: Not on file         Current Outpatient Medications:   •  acetaminophen (TYLENOL) 650 mg CR tablet, Take 1 tablet (650 mg total) by mouth every 8 (eight) hours as needed for mild pain, Disp: 30 tablet, Rfl: 0  •  alfuzosin (UROXATRAL) 10 mg 24 hr tablet, Take 10 mg by mouth daily, Disp: , Rfl:   •  amiodarone 200 mg tablet, , Disp: , Rfl:   •  apixaban (ELIQUIS) 5 mg, Take 5 mg by mouth 2 (two) times a day, Disp: , Rfl:   •  diltiazem (CARDIZEM CD) 120 mg 24 hr capsule, , Disp: , Rfl:   •  finasteride (PROSCAR) 5 mg tablet, , Disp: , Rfl:   •  folic acid (FOLVITE) 1 mg tablet, , Disp: , Rfl:   •  furosemide (LASIX) 40 mg tablet, , Disp: , Rfl:   •  metoprolol tartrate (LOPRESSOR) 50 mg tablet, Take 25 mg by mouth every 12 (twelve) hours, Disp: , Rfl:   •  omeprazole (PriLOSEC) 40 MG capsule, Take 40 mg by mouth daily, Disp: , Rfl:   •  rOPINIRole (REQUIP) 3 mg tablet, , Disp: , Rfl:   •  simvastatin (ZOCOR) 40 mg tablet, Take 40 mg by mouth daily at bedtime, Disp: , Rfl:   •  Trexall 10 MG tablet, , Disp: , Rfl:     Allergies   Allergen Reactions   • Penicillins        Objective:  Vitals:    02/01/23 0958   BP: 142/67   Pulse: 91       Ortho Exam    Physical Exam    I have personally reviewed pertinent films in PACS and my interpretation is as follows:no new images reviewed  This document was created using speech voice recognition software  Grammatical errors, random word insertions, pronoun errors, and incomplete sentences are an occasional consequence of this system due to software limitations, ambient noise, and hardware issues  Any formal questions or concerns about content, text, or information contained within the body of this dictation should be directly addressed to the provider for clarification

## 2023-03-03 ENCOUNTER — OFFICE VISIT (OUTPATIENT)
Dept: OBGYN CLINIC | Facility: CLINIC | Age: 73
End: 2023-03-03

## 2023-03-03 VITALS
TEMPERATURE: 98.8 F | DIASTOLIC BLOOD PRESSURE: 76 MMHG | HEART RATE: 88 BPM | BODY MASS INDEX: 48.7 KG/M2 | WEIGHT: 303 LBS | SYSTOLIC BLOOD PRESSURE: 150 MMHG | HEIGHT: 66 IN

## 2023-03-03 DIAGNOSIS — M17.12 PRIMARY OSTEOARTHRITIS OF LEFT KNEE: Primary | ICD-10-CM

## 2023-03-03 DIAGNOSIS — R60.9 PITTING EDEMA: ICD-10-CM

## 2023-03-03 DIAGNOSIS — S83.232D COMPLEX TEAR OF MEDIAL MENISCUS OF LEFT KNEE AS CURRENT INJURY, SUBSEQUENT ENCOUNTER: ICD-10-CM

## 2023-03-03 NOTE — PROGRESS NOTES
Assessment/Plan:  1  Primary osteoarthritis of left knee        2  Complex tear of medial meniscus of left knee as current injury, subsequent encounter        3  Pitting edema  Ambulatory Referral to Vascular Surgery        Scribe Attestation    I,:  Chet Wayne am acting as a scribe while in the presence of the attending physician :       I,:  Erin Hui MD personally performed the services described in this documentation    as scribed in my presence :             It was discussed with Reyna Kimble that he would likely benefit from a left total knee arthroplasty  It was discussed with Reyna Kimble  That his BMI must be 40 or below in order to proceed with surgical intervention  He does have pitting edema to bilateral lower extremities and is currently on medication for this and seeing a vascular physician at an outside facility  A referral was provided to Dr Harry Donovan as he would like to switch vascular surgeons and notes the [address] [place] [address] Road location works best for him  Discussed a stationary bike can be beneficial for his knees and help reduce some of the pitting edema to his lower extremities  I will see him back in the office as needed if symptoms worsen or fail to improve  Subjective:   Eliana German is a 67 y o  male who presents to the office for a follow regarding his left knee  He completed Eufleexa injection series on 12/21/22  He states that "he cant say the injection didn't help" but he has pain to the medial aspect of his right knee with ambulation or if he sits for longer periods of time  He notes knee ROM has improved since the Euflexxa injections  He is not currently taking anything for pain control  He notes that his right knee is also starting to become painful for him  Review of Systems   Constitutional: Negative for chills, fever and unexpected weight change  HENT: Negative for hearing loss, nosebleeds and sore throat  Eyes: Negative for pain, redness and visual disturbance  Respiratory: Negative for cough, shortness of breath and wheezing  Cardiovascular: Negative for chest pain, palpitations and leg swelling  Gastrointestinal: Negative for abdominal pain, nausea and vomiting  Endocrine: Negative for polydipsia and polyuria  Genitourinary: Negative for difficulty urinating and hematuria  Musculoskeletal: Positive for arthralgias  Negative for joint swelling and myalgias  Skin: Negative for rash and wound  Neurological: Negative for dizziness, numbness and headaches  Psychiatric/Behavioral: Negative for decreased concentration, dysphoric mood and suicidal ideas  The patient is not nervous/anxious  Past Medical History:   Diagnosis Date   • Cardiac disease     atrial fib,pacemaker   • Hyperlipidemia    • Hypertension        Past Surgical History:   Procedure Laterality Date   • FL INJECTION LEFT KNEE (ARTHROGRAM)  12/6/2022       History reviewed  No pertinent family history      Social History     Occupational History   • Not on file   Tobacco Use   • Smoking status: Former   • Smokeless tobacco: Never   Vaping Use   • Vaping Use: Never used   Substance and Sexual Activity   • Alcohol use: Yes     Comment: occas   • Drug use: Never   • Sexual activity: Not on file         Current Outpatient Medications:   •  acetaminophen (TYLENOL) 650 mg CR tablet, Take 1 tablet (650 mg total) by mouth every 8 (eight) hours as needed for mild pain, Disp: 30 tablet, Rfl: 0  •  alfuzosin (UROXATRAL) 10 mg 24 hr tablet, Take 10 mg by mouth daily, Disp: , Rfl:   •  amiodarone 200 mg tablet, , Disp: , Rfl:   •  apixaban (ELIQUIS) 5 mg, Take 5 mg by mouth 2 (two) times a day, Disp: , Rfl:   •  diltiazem (CARDIZEM CD) 120 mg 24 hr capsule, , Disp: , Rfl:   •  finasteride (PROSCAR) 5 mg tablet, , Disp: , Rfl:   •  folic acid (FOLVITE) 1 mg tablet, , Disp: , Rfl:   •  furosemide (LASIX) 40 mg tablet, , Disp: , Rfl:   •  metoprolol tartrate (LOPRESSOR) 50 mg tablet, Take 25 mg by mouth every 12 (twelve) hours, Disp: , Rfl:   •  omeprazole (PriLOSEC) 40 MG capsule, Take 40 mg by mouth daily, Disp: , Rfl:   •  rOPINIRole (REQUIP) 3 mg tablet, , Disp: , Rfl:   •  simvastatin (ZOCOR) 40 mg tablet, Take 40 mg by mouth daily at bedtime, Disp: , Rfl:   •  Trexall 10 MG tablet, , Disp: , Rfl:     Allergies   Allergen Reactions   • Penicillins        Objective:  Vitals:    03/03/23 1251   BP: 150/76   Pulse: 88   Temp: 98 8 °F (37 1 °C)       Right Knee Exam     Range of Motion   Extension: 0   Right knee flexion: 105  Tests   Varus: negative Valgus: negative    Other   Erythema: absent  Scars: absent  Sensation: normal  Pulse: present  Effusion: no effusion present    Comments:  2+ pitting edema       Left Knee Exam     Range of Motion   Extension: 0   Flexion: 90     Other   Erythema: absent  Scars: absent  Sensation: normal  Pulse: present  Effusion: no effusion present    Comments:  3+ pitting edema           Observations   Left Knee   Negative for effusion  Right Knee   Negative for effusion  Physical Exam  Vitals and nursing note reviewed  Constitutional:       Appearance: He is well-developed  Eyes:      Extraocular Movements: Extraocular movements intact  Conjunctiva/sclera: Conjunctivae normal       Pupils: Pupils are equal, round, and reactive to light  Pulmonary:      Effort: Pulmonary effort is normal    Musculoskeletal:      Right knee: No effusion  Left knee: No effusion  Comments: As noted in HPI   Skin:     General: Skin is warm and dry  Neurological:      Mental Status: He is alert and oriented to person, place, and time  Psychiatric:         Behavior: Behavior normal          I have personally reviewed pertinent films in PACS and my interpretation is as follows: No new imaging to review       This document was created using speech voice recognition software     Grammatical errors, random word insertions, pronoun errors, and incomplete sentences are an occasional consequence of this system due to software limitations, ambient noise, and hardware issues  Any formal questions or concerns about content, text, or information contained within the body of this dictation should be directly addressed to the provider for clarification

## 2023-03-16 ENCOUNTER — OFFICE VISIT (OUTPATIENT)
Dept: VASCULAR SURGERY | Facility: CLINIC | Age: 73
End: 2023-03-16

## 2023-03-16 ENCOUNTER — TELEPHONE (OUTPATIENT)
Dept: VASCULAR SURGERY | Facility: CLINIC | Age: 73
End: 2023-03-16

## 2023-03-16 VITALS
HEART RATE: 77 BPM | SYSTOLIC BLOOD PRESSURE: 132 MMHG | DIASTOLIC BLOOD PRESSURE: 68 MMHG | BODY MASS INDEX: 47.89 KG/M2 | HEIGHT: 66 IN | WEIGHT: 298 LBS

## 2023-03-16 DIAGNOSIS — I87.2 EDEMA OF BOTH LOWER EXTREMITIES DUE TO PERIPHERAL VENOUS INSUFFICIENCY: ICD-10-CM

## 2023-03-16 DIAGNOSIS — I89.0 LYMPHEDEMA: Primary | ICD-10-CM

## 2023-03-16 DIAGNOSIS — R60.9 PITTING EDEMA: ICD-10-CM

## 2023-03-16 DIAGNOSIS — E66.01 OBESITY, MORBID (HCC): ICD-10-CM

## 2023-03-16 NOTE — PROGRESS NOTES
Assessment/Plan:    Venous insufficiency  Secondary Lymphedema  Pitting edema  -     Ambulatory Referral to Vascular Surgery  -     Compression Stocking  -     Assistive Device (stocking application)  -     Ambulatory Referral to PT/OT Lymphedema Therapy; Future  -     Pneumatic compression pumps  -     VAS BALJINDER & waveform analysis, multiple levels; Future    -Chronic B LE and pedal edema x 20 years  -Hx multiple venous procedures as per patient  -Heavy, achy legs  -Exam: Obesity; 2-3+ LE and pedal edema, chronic stasis changes    Plan:  Patient for evaluation of marked, bilateral lower extremity edema  He gives history of lower extremity edema with painful, achy legs for "20 years "  He has been on diuretics, wearing compression garments, CircAids and, by history, he has had recurrent venous laser procedures performed at outside facilities  He has had minimal improvement in leg edema after these procedures  He also is limited by OA/ knee pain  He is motivated to exercise and lose weight so that he can get knee replacement, but unable to get the leg swelling and associated pain under control  We did detailed discussion regarding the pathophysiology and treatment of lower extremity edema  I explained that lymphedema is a chronic condition which requires daily management to avoid worsening of condition, wounds and infections  He should continue with compressive measures and work on weight loss  I will defer continued need/ benefit of any diuretic therapy to his PCP  Based on stated compliance with compressive measures and unsuccessful venous procedures, recommend the addition of lymphedema pump therapy  He is unlikely to improve without the additional treatment  I also recommend that he be seen in the lymphedema clinic for manual lymphatic drainage for therapeutic purposes and to help get his legs more compensated so he can manage them      -Continue with compression, elevation, activity as tolerated and skin moisturizer    -Add lymphedema pump therapy for 60 minutes twice daily  -Regular exercise for weight loss will help your lymphedema and general health  -Good heart healthy diet with low sodium  -Diuretic therapy deferred to his PCP  -Refer to lymphedema clinic  -We could consider reflux study but it would not likely   -Patient education regarding lymphedema was provided  -Follow-up in 1 month    -Please try to obtain records from [facility] and Dr Jose L Ordonez which are 2 separate offices      Obesity, morbid ([address] )        Subjective:      Patient ID: Lindsey Ag is a 67 y o  male  Pt is new and referred for eval of BLE edema  Pt wears compression  Pt denies tissue loss  HPI    Lindsey Ag is a 67 y o  male obesity, hypertension, hyperlipidemia, AF on apixaban, PPM who presents for evaluation of bilateral LE edema  Patient receives most of his care at the [facility] system so I do not have access to records  His chart and care everywhere were reviewed  Patient states that he has had difficulty with leg swelling for 20 years  He has been seen by at least 2 cardiologists at OSH for leg edema  He currently velcro type compression, stockings and he has been on diuretic therapy but remains uncontrolled  He also tells me that he had laser ablation at [facility] years ago  He was then seen separately by Dr Jose L Ordonez in [place], [place] who apparently also did venous procedures  Despite these measures, he continued to have uncontrolled leg edema  Dr Jose L Ordonez performed repeat duplex and echocardiogram  As per patient's anamnesis, after echo, he has no cardiac condition that would cause edema and no other surgical options were available, so compression and other conservative measures were recommended  No history of DVT  He has no wounds or weeping  He is on apixaban for AF  He has no chest pain or shortness of breath  Patient is frustrated and comes in for another opinion   He would like to be able to get edema under control, lose weight and have knee replacements  He had a venous duplex at [facility] on 9/7/22 which was grossly negative for DVT  He remains on apixaban  The following portions of the patient's history were reviewed and updated as appropriate: allergies, current medications, past family history, past medical history, past social history, past surgical history and problem list     Review of Systems   Constitutional: Negative  HENT: Negative  Eyes: Negative  Respiratory: Negative  Cardiovascular: Positive for leg swelling  Gastrointestinal: Negative  Endocrine: Negative  Genitourinary: Negative  Musculoskeletal: Positive for gait problem  Skin: Negative  Allergic/Immunologic: Negative  Hematological: Negative  Psychiatric/Behavioral: Negative  Objective:      /68 (BP Location: Left arm, Patient Position: Sitting, Cuff Size: Standard)   Pulse 77   Ht 5' 6" (1 676 m)   Wt 135 kg (298 lb)   BMI 48 10 kg/m²     Hearing aids  Morbid obesity  Abd is protuberant    Marked, taut LE leg and pedal edema  Chronic stasis changes  Calves non-tender  Pedal edema, feet warm; PT/DP signals bilaterally           Physical Exam  Vitals and nursing note reviewed  Constitutional:       Appearance: He is well-developed  He is obese  HENT:      Head: Normocephalic and atraumatic  Eyes:      Pupils: Pupils are equal, round, and reactive to light  Neck:      Vascular: No carotid bruit  Comments: thick  Cardiovascular:      Rate and Rhythm: Normal rate and regular rhythm  Pulses:           Carotid pulses are 2+ on the right side and 2+ on the left side  Radial pulses are 2+ on the right side and 2+ on the left side  Dorsalis pedis pulses are detected w/ Doppler on the right side and detected w/ Doppler on the left side          Posterior tibial pulses are detected w/ Doppler on the right side and detected w/ Doppler on the left side  Heart sounds: Normal heart sounds, S1 normal and S2 normal  No murmur heard  No friction rub  No gallop  Pulmonary:      Effort: Pulmonary effort is normal  No accessory muscle usage or respiratory distress  Breath sounds: Normal breath sounds  No wheezing or rales  Abdominal:      General: Bowel sounds are normal  There is no distension  Palpations: Abdomen is soft  Tenderness: There is no abdominal tenderness  Musculoskeletal:         General: No deformity  Normal range of motion  Cervical back: Neck supple  Right lower leg: 3+ Pitting Edema present  Left lower leg: 3+ Pitting Edema present  Skin:     General: Skin is warm and dry  Capillary Refill: Capillary refill takes less than 2 seconds  Findings: No lesion or rash  Nails: There is no clubbing  Neurological:      Mental Status: He is alert and oriented to person, place, and time  Comments: Grossly normal    Psychiatric:         Behavior: Behavior is cooperative  LEV 9/7/2022  CLINICAL:  Indications:  Patient presents with bilateral lower extremity edema and pain (L>R) x1 week  Operative History:  EVLT (as per patient)  Pacemaker  Risk Factors: The patient has history of A-fib (on Eliquis), Leukemia, HTN and  Hyperlipidemia  CONCLUSION:     Impression:  RIGHT LOWER LIMB: Limited  No gross evidence of acute deep vein thrombosis in the CFV, FV, Popliteal,  Gastrocnemius, Posterior Tibial and Peroneal Veins  There is a well defined hypoechoic non-vascularized cystic-type structure noted  in the popliteal fossa  LEFT LOWER LIMB: Limited  No gross evidence of acute deep vein thrombosis in the CFV, FV, Popliteal,  Gastrocnemius, Posterior Tibial and Peroneal Veins  There is a hypoechoic non-vascularized cystic-type structure noted in the  popliteal fossa and extends into the calf      I have reviewed and made appropriate changes to the review of systems input by the medical assistant  Vitals:    03/16/23 1143   BP: 132/68   BP Location: Left arm   Patient Position: Sitting   Cuff Size: Standard   Pulse: 77   Weight: 135 kg (298 lb)   Height: 5' 6" (1 676 m)       Patient Active Problem List   Diagnosis   • Primary osteoarthritis of left knee   • Effusion of left knee   • Complex tear of medial meniscus of left knee as current injury       Past Surgical History:   Procedure Laterality Date   • FL INJECTION LEFT KNEE (ARTHROGRAM)  12/6/2022       No family history on file      Social History     Socioeconomic History   • Marital status:      Spouse name: Not on file   • Number of children: Not on file   • Years of education: Not on file   • Highest education level: Not on file   Occupational History   • Not on file   Tobacco Use   • Smoking status: Former   • Smokeless tobacco: Never   Vaping Use   • Vaping Use: Never used   Substance and Sexual Activity   • Alcohol use: Yes     Comment: occas   • Drug use: Never   • Sexual activity: Not on file   Other Topics Concern   • Not on file   Social History Narrative   • Not on file     Social Determinants of Health     Financial Resource Strain: Not on file   Food Insecurity: Not on file   Transportation Needs: Not on file   Physical Activity: Not on file   Stress: Not on file   Social Connections: Not on file   Intimate Partner Violence: Not on file   Housing Stability: Not on file       Allergies   Allergen Reactions   • Penicillins          Current Outpatient Medications:   •  acetaminophen (TYLENOL) 650 mg CR tablet, Take 1 tablet (650 mg total) by mouth every 8 (eight) hours as needed for mild pain, Disp: 30 tablet, Rfl: 0  •  alfuzosin (UROXATRAL) 10 mg 24 hr tablet, Take 10 mg by mouth daily, Disp: , Rfl:   •  amiodarone 200 mg tablet, , Disp: , Rfl:   •  apixaban (ELIQUIS) 5 mg, Take 5 mg by mouth 2 (two) times a day, Disp: , Rfl:   •  diltiazem (CARDIZEM CD) 120 mg 24 hr capsule, , Disp: , Rfl:   •  finasteride (PROSCAR) 5 mg tablet, , Disp: , Rfl:   •  folic acid (FOLVITE) 1 mg tablet, , Disp: , Rfl:   •  furosemide (LASIX) 40 mg tablet, , Disp: , Rfl:   •  metoprolol tartrate (LOPRESSOR) 50 mg tablet, Take 25 mg by mouth every 12 (twelve) hours, Disp: , Rfl:   •  omeprazole (PriLOSEC) 40 MG capsule, Take 40 mg by mouth daily, Disp: , Rfl:   •  rOPINIRole (REQUIP) 3 mg tablet, , Disp: , Rfl:   •  simvastatin (ZOCOR) 40 mg tablet, Take 40 mg by mouth daily at bedtime, Disp: , Rfl:   •  Trexall 10 MG tablet, , Disp: , Rfl:

## 2023-03-16 NOTE — PROGRESS NOTES
[facility]   Phone: [phone]  Fax: [phone]   E-mail: [email or web address]    Ordering Provider:  CARLOS EDUARDO Goodman (NPI: [id number])  [facility]  [address]  [place]   [address] [facility], [address] [place] [address]  Phone: [phone]  Fax: [phone]      Patient Information  MRN: [id number]   Carry Anderson  1950  [address] [place] [address],[address] [facility]  [phone]     Insurance Information  Payor: MEDICARE / Plan: MEDICARE A AND B / Product Type: Medicare A & B Fee for Service /      [id number] - (Medicare )     Patient Height and Weight 5' 6" (1 676 m)    Wt Readings from Last 1 Encounters:   03/16/23 135 kg (298 lb)       Compression Lymphedema Pump Prescription Form      [x] Patient utilized Compression Garment ?  30 mmHg distally OR Gradient Wrap System    Appliance:     Legs:    [x] Right    [x] Left   Arms:    [] Right    [] Left     []Chest garment    []Abdominal garment     Length of need: 99 months    Protocol:  [x] Std: 40 mmHg, TID/ BID,  60 minutes  [] Other:   Pressures: __ mmHg    Frequency: __ / Day   Minutes/ Session: __ minutes    Patient History and Prognosis:  [x] Patient was diagnosed for the chronic disorder with reported on-set __  [x] Attempts at elevation and compression have not improved patients' condition and is now at risk of lymphatic disorder progressing to the next stage/ grade  [x] Patient's physical condition/ range of motion limited for exercise  [x] Patient/ Caregiver experienced difficulty applying 30 mmHg distal compression garments  [x] Patient compression stocking/ wrap tolerance limited due to pain/ reduced circulation  [x] Patient advised to reduce salt intake and adhere to a daily regiment of elevation, compression, and lymphatic exercises  [x] Patient's severe condition will not improve without further treatment interventions  [x] Patient has noted skin changes such as marked hyperkeratosis with hyperplasia and hyperpigmentation, papillomatosis cutis lymphostatica, elephantiasis, and/ or skin breakdown with persisting lymphorrhea    Symptoms/ Observation/ Evaluation/ Plan of Care for Lymphedema / Venous Compression Pumps     Conservative Care ? 4 weeks for severe lymphedema (check all that apply):  Patient instructions for DAILY use of conservative therapies;  [x] Elevate extremities daily and nightly to reduce swelling  [x] Exercise and ambulate / range of motion (ROM) daily to increase fluid flow and reduce swelling  [x] Wear 30-mmHg distal compression garments / wraps daily to reduce / control swelling  [x] Manual lymph drainage (MLD)  [x] On-set date of lymphedema / ulcers: 2000      Initial Measurements      Body Part Right Left   Ankle / Forearm 30 cm 30 cm   Calf / Elbow  51 cm 58 cm   Knee / Bicep  Not Applicable (N/A) Not Applicable (N/A)   Mid-Thigh / Axilla  60 cm 61 cm

## 2023-03-16 NOTE — PATIENT INSTRUCTIONS
Lymphedema    -Continue with compression, elevation, activity as tolerated and skin moisturizer      -Compression to be worn every day and removed at night  -Lymphedema pump therapy for 60 minutes twice daily -once in the morning and once at night  -Elevate your legs when at rest  -Regular exercise for weight loss will help your lymphedema and general health  -Good heart healthy diet with low sodium  -Refer to lymphedema clinic for manual lymphatic drainage for therapeutic purposes  -Apply good moisturizer to skin  -You must be vigilant in conservative measures to prevent the condition from worsening which can lead to infections    -Follow-up in 1 month

## 2023-03-21 ENCOUNTER — HOSPITAL ENCOUNTER (OUTPATIENT)
Dept: RADIOLOGY | Facility: HOSPITAL | Age: 73
Discharge: HOME | End: 2023-03-21

## 2023-03-21 DIAGNOSIS — I89.0 LYMPHEDEMA: ICD-10-CM

## 2023-05-12 ENCOUNTER — OFFICE VISIT (OUTPATIENT)
Dept: OBGYN CLINIC | Facility: CLINIC | Age: 73
End: 2023-05-12

## 2023-05-12 ENCOUNTER — APPOINTMENT (OUTPATIENT)
Dept: RADIOLOGY | Facility: CLINIC | Age: 73
End: 2023-05-12

## 2023-05-12 VITALS
HEART RATE: 88 BPM | TEMPERATURE: 98 F | SYSTOLIC BLOOD PRESSURE: 150 MMHG | HEIGHT: 66 IN | BODY MASS INDEX: 46.61 KG/M2 | WEIGHT: 290 LBS | DIASTOLIC BLOOD PRESSURE: 80 MMHG

## 2023-05-12 DIAGNOSIS — M17.12 PRIMARY OSTEOARTHRITIS OF LEFT KNEE: Primary | ICD-10-CM

## 2023-05-12 DIAGNOSIS — M17.12 PRIMARY OSTEOARTHRITIS OF LEFT KNEE: ICD-10-CM

## 2023-05-12 DIAGNOSIS — G89.29 CHRONIC PAIN OF LEFT KNEE: ICD-10-CM

## 2023-05-12 DIAGNOSIS — E66.01 OBESITY, MORBID (HCC): ICD-10-CM

## 2023-05-12 DIAGNOSIS — Z01.89 ENCOUNTER FOR LOWER EXTREMITY COMPARISON IMAGING STUDY: ICD-10-CM

## 2023-05-12 DIAGNOSIS — M25.562 CHRONIC PAIN OF LEFT KNEE: ICD-10-CM

## 2023-05-12 NOTE — PROGRESS NOTES
Assessment/Plan:  1  Primary osteoarthritis of left knee  XR knee 3 vw left non injury    Ambulatory Referral to Bariatric Surgery      2  Chronic pain of left knee        3  Obesity, morbid [facility])  Ambulatory Referral to Bariatric Surgery        Scribe Attestation    I,:  Misael Chatman am acting as a scribe while in the presence of the attending physician :       I,:  Bobby Martines, DO personally performed the services described in this documentation    as scribed in my presence :         Teto Leon is a pleasant 67 y o  male presenting for evaluation of his left knee pain  After a thorough history, physical exam and imaging review it was discussed with Teto Leon that he is symptomatic from severe left knee osteoarthritis  It was discussed with Teto Leon that he would benefit from a left total knee arthroplasty  He is not currently a candidate for a left total knee arthroplasty as his BMI is over 40  We discussed that this increases his surgical risks by 10-12 times the average risks  He has aprox  40 more lbs to loose, he recently lost 18 lbs  A referral was provided to Bariatric surgery for surgical and non surgical options to maximize weight loss for surgical planning  I will see him back in 3 months time for a weight check as he has failed non operative management in the form of CSI's  Visco injections and bracing  Subjective: initial evaluation of left knee pain     Patient ID: Sharmaine Carpenter is a 67 y o  male who presents to the office for left knee pain  He notes pain to the medial aspect of his left knee has been ongoing since October of 2022  He denies any injury or trauma  He notes pain will worsen with prolonged knee flexion or with ambulation  He underwent a left knee CSI on 10/6/22 with Dr BRADFORD [facility]  He underwent a left knee Toradol injection and Aspiration with Dr BRAFDORD [facility] on 10/20/22  He completed left knee Euflexxa #3 series with Dr Selvin Nice on 2/1/23   He states that none of the injections were beneficial for him  He notes continued pain to the medial aspect of the left knee  He is not taking anything for pain control as OTC analgesics are not beneficial for him  He denies attending PT for this issue  He will wear a knee brace for comfort and support  He does have bilateral lower leg lymphedema, this is improving with a Keto diet  He recently lost 18 lbs  Review of Systems   Constitutional: Positive for activity change  Negative for chills, fever and unexpected weight change  HENT: Negative for hearing loss, nosebleeds and sore throat  Eyes: Negative for pain, redness and visual disturbance  Respiratory: Negative for cough, shortness of breath and wheezing  Cardiovascular: Negative for chest pain, palpitations and leg swelling  Gastrointestinal: Negative for abdominal pain, nausea and vomiting  Endocrine: Negative for polydipsia and polyuria  Genitourinary: Negative for difficulty urinating and hematuria  Musculoskeletal: Positive for arthralgias and myalgias  Negative for joint swelling  Skin: Negative for rash and wound  Neurological: Negative for dizziness, numbness and headaches  Psychiatric/Behavioral: Negative for decreased concentration, dysphoric mood and suicidal ideas  The patient is not nervous/anxious  Past Medical History:   Diagnosis Date   • Cardiac disease     atrial fib,pacemaker   • Hyperlipidemia    • Hypertension        Past Surgical History:   Procedure Laterality Date   • FL INJECTION LEFT KNEE (ARTHROGRAM)  12/6/2022       History reviewed  No pertinent family history      Social History     Occupational History   • Not on file   Tobacco Use   • Smoking status: Former   • Smokeless tobacco: Never   Vaping Use   • Vaping Use: Never used   Substance and Sexual Activity   • Alcohol use: Yes     Comment: occas   • Drug use: Never   • Sexual activity: Not on file         Current Outpatient Medications:   •  acetaminophen (TYLENOL) 650 mg CR tablet, Take 1 tablet (650 mg total) by mouth every 8 (eight) hours as needed for mild pain, Disp: 30 tablet, Rfl: 0  •  alfuzosin (UROXATRAL) 10 mg 24 hr tablet, Take 10 mg by mouth daily, Disp: , Rfl:   •  amiodarone 200 mg tablet, , Disp: , Rfl:   •  apixaban (ELIQUIS) 5 mg, Take 5 mg by mouth 2 (two) times a day, Disp: , Rfl:   •  diltiazem (CARDIZEM CD) 120 mg 24 hr capsule, , Disp: , Rfl:   •  finasteride (PROSCAR) 5 mg tablet, , Disp: , Rfl:   •  folic acid (FOLVITE) 1 mg tablet, , Disp: , Rfl:   •  furosemide (LASIX) 40 mg tablet, , Disp: , Rfl:   •  metoprolol tartrate (LOPRESSOR) 50 mg tablet, Take 25 mg by mouth every 12 (twelve) hours, Disp: , Rfl:   •  omeprazole (PriLOSEC) 40 MG capsule, Take 40 mg by mouth daily, Disp: , Rfl:   •  rOPINIRole (REQUIP) 3 mg tablet, , Disp: , Rfl:   •  simvastatin (ZOCOR) 40 mg tablet, Take 40 mg by mouth daily at bedtime, Disp: , Rfl:   •  Trexall 10 MG tablet, , Disp: , Rfl:     Allergies   Allergen Reactions   • Penicillins        Objective:  Vitals:    05/12/23 1017   BP: 150/80   Pulse: 88   Temp: 98 °F (36 7 °C)       Body mass index is 46 81 kg/m²  Left Knee Exam     Tenderness   The patient is experiencing tenderness in the medial joint line and patella  Range of Motion   Extension: normal   Flexion:  120 normal     Tests   Varus: negative Valgus: negative  Drawer:  Anterior - negative         Other   Erythema: absent  Scars: absent  Sensation: normal  Pulse: present  Swelling: none  Effusion: no effusion present    Comments:  5 degree varus deformity that is passively correctable   + patellofemoral grind           Observations   Left Knee   Negative for effusion  Physical Exam  Vitals and nursing note reviewed  Constitutional:       Appearance: Normal appearance  He is well-developed  HENT:      Head: Normocephalic and atraumatic        Right Ear: External ear normal       Left Ear: External ear normal       Nose: Nose normal    Eyes:      Extraocular Movements: Extraocular movements intact  Conjunctiva/sclera: Conjunctivae normal    Cardiovascular:      Rate and Rhythm: Normal rate  Pulmonary:      Effort: Pulmonary effort is normal  No respiratory distress  Abdominal:      Palpations: Abdomen is soft  Musculoskeletal:      Cervical back: Normal range of motion  Left knee: No effusion  Comments: See ortho exam    Skin:     General: Skin is warm and dry  Capillary Refill: Capillary refill takes less than 2 seconds  Neurological:      General: No focal deficit present  Mental Status: He is alert and oriented to person, place, and time  Psychiatric:         Mood and Affect: Mood normal          Behavior: Behavior normal          Thought Content: Thought content normal          Judgment: Judgment normal          I have personally reviewed pertinent films in PACS  X-rays of the left knee obtained in the office today demonstrate no acute fracture or dislocation  Severe end-stage osteoarthritis of the left knee mostly consuming the medial compartment  Possible OCD lesion due to femoral condyle  This document was created using speech voice recognition software  Grammatical errors, random word insertions, pronoun errors, and incomplete sentences are an occasional consequence of this system due to software limitations, ambient noise, and hardware issues  Any formal questions or concerns about content, text, or information contained within the body of this dictation should be directly addressed to the provider for clarification

## 2023-08-09 ENCOUNTER — RA CDI HCC (OUTPATIENT)
Dept: OTHER | Facility: HOSPITAL | Age: 73
End: 2023-08-09

## 2023-08-15 ENCOUNTER — OFFICE VISIT (OUTPATIENT)
Dept: FAMILY MEDICINE CLINIC | Facility: CLINIC | Age: 73
End: 2023-08-15
Payer: MEDICARE

## 2023-08-15 VITALS
TEMPERATURE: 98.7 F | WEIGHT: 284 LBS | DIASTOLIC BLOOD PRESSURE: 70 MMHG | SYSTOLIC BLOOD PRESSURE: 130 MMHG | HEART RATE: 84 BPM | HEIGHT: 66 IN | BODY MASS INDEX: 45.64 KG/M2 | RESPIRATION RATE: 18 BRPM

## 2023-08-15 DIAGNOSIS — G25.81 RESTLESS LEG: ICD-10-CM

## 2023-08-15 DIAGNOSIS — M17.12 PRIMARY OSTEOARTHRITIS OF LEFT KNEE: ICD-10-CM

## 2023-08-15 DIAGNOSIS — I48.91 ATRIAL FIBRILLATION, UNSPECIFIED TYPE (HCC): ICD-10-CM

## 2023-08-15 DIAGNOSIS — I89.0 LYMPHEDEMA: Primary | ICD-10-CM

## 2023-08-15 PROBLEM — E78.5 HYPERLIPIDEMIA: Status: ACTIVE | Noted: 2023-08-15

## 2023-08-15 PROCEDURE — 99204 OFFICE O/P NEW MOD 45 MIN: CPT | Performed by: FAMILY MEDICINE

## 2023-08-15 RX ORDER — PREDNISOLONE ACETATE 10 MG/ML
SUSPENSION/ DROPS OPHTHALMIC
COMMUNITY
Start: 2023-05-19

## 2023-08-15 NOTE — PROGRESS NOTES
Sasha Franco 1950 male MRN: [id number]    FAMILY PRACTICE OFFICE VISIT  [facility] - [address] [place] [address] [place]      ASSESSMENT/PLAN  Sasha Franco is a 75 y.o. male presents to the office for    Diagnoses and all orders for this visit:    Lymphedema    Primary osteoarthritis of left knee    Atrial fibrillation, unspecified type ([address])  -     Comprehensive metabolic panel; Future  -     CBC and differential; Future  -     TSH, 3rd generation; Future    BMI 45.0-49.9, adult (HCC)    Restless leg    Other orders  -     prednisoLONE acetate (PRED FORTE) 1 % ophthalmic suspension  -     Multiple Vitamin (MULTIVITAMIN ADULT PO); Take by mouth  -     Multiple Vitamins-Minerals (PRESERVISION AREDS PO); Take by mouth       We will be sending the patient for lymphedema evaluation by lymphedema pumps  I did explain to the patient that he will not be able to lose the swelling in his legs given the lymphedema. Lasix will only help with water weight. We will have to get basic blood work to see patient's baseline  A-fib is currently rate controlled is on amiodarone that can cause thyroid disease therefore recommend repeat  I do believe that the patient would benefit from left knee replacement and is stable from my standpoint to proceed for surgery  No other changes at this time  Continue on restless leg medications as prescribed on medical list  Recommend starting [facility] but recommend getting cleared by hematology and cardiology  Uncertain what type of leukemia the patient suffers from  BMI Counseling: Body mass index is 45.84 kg/m². The BMI is above normal. Nutrition recommendations include decreasing fast food intake, consuming healthier snacks and limiting drinks that contain sugar. Rationale for BMI follow-up plan is due to patient being overweight or obese. Depression Screening and Follow-up Plan: Patient was screened for depression during today's encounter.  They screened negative with a PHQ-2 score of 0. Future Appointments   Date Time Provider [address]   8/16/2023  8:15 AM Darcy Montez DO [facility]          SUBJECTIVE  CC: Establish Care (Water retention)      HPI:  Moshe Proctor is a 75 y.o. male who presents for an establish care visit. Patient states he has a history of A-fib bilateral lymphedema, water retention?,  Restless leg syndrome hyperlipidemia,. Patient states that he has been wanting to get left knee replacement but has been unable to lose the weight for the surgery therefore has been having difficulty. Started on keto diet, was advise by PCP its water weight. Has lost 25 pounds since starting this diet  Vascular doctor BID for Lasix but has not shown any difference  A fib has been doing very well on his medications  Pacemaker only because of low heart rate in the past  Leukemia Dr. [facility]. Does not know what type of leukemia he has  This leg syndrome states the medication he is on is currently helping him    ,Review of Systems   Constitutional: Positive for activity change and fatigue. Negative for appetite change, chills and fever. HENT: Negative for congestion. Respiratory: Negative for cough, chest tightness and shortness of breath. Cardiovascular: Positive for leg swelling. Negative for chest pain. Gastrointestinal: Negative for abdominal distention, abdominal pain, constipation, diarrhea, nausea and vomiting. Musculoskeletal: Positive for arthralgias. All other systems reviewed and are negative.       Historical Information   The patient history was reviewed as follows:  Past Medical History:   Diagnosis Date   • Cardiac disease     atrial fib,pacemaker   • Hyperlipidemia    • Hypertension          Medications:     Current Outpatient Medications:   •  alfuzosin (UROXATRAL) 10 mg 24 hr tablet, Take 10 mg by mouth daily, Disp: , Rfl:   •  amiodarone 200 mg tablet, , Disp: , Rfl:   •  apixaban (ELIQUIS) 5 mg, Take 5 mg by mouth 2 (two) times a day, Disp: , Rfl:   •  diltiazem (CARDIZEM CD) 120 mg 24 hr capsule, , Disp: , Rfl:   •  folic acid (FOLVITE) 1 mg tablet, , Disp: , Rfl:   •  furosemide (LASIX) 40 mg tablet, , Disp: , Rfl:   •  metoprolol tartrate (LOPRESSOR) 50 mg tablet, Take 25 mg by mouth every 12 (twelve) hours, Disp: , Rfl:   •  Multiple Vitamin (MULTIVITAMIN ADULT PO), Take by mouth, Disp: , Rfl:   •  Multiple Vitamins-Minerals (PRESERVISION AREDS PO), Take by mouth, Disp: , Rfl:   •  omeprazole (PriLOSEC) 40 MG capsule, Take 40 mg by mouth daily, Disp: , Rfl:   •  prednisoLONE acetate (PRED FORTE) 1 % ophthalmic suspension, , Disp: , Rfl:   •  rOPINIRole (REQUIP) 3 mg tablet, , Disp: , Rfl:   •  simvastatin (ZOCOR) 40 mg tablet, Take 40 mg by mouth daily at bedtime, Disp: , Rfl:   •  finasteride (PROSCAR) 5 mg tablet, , Disp: , Rfl:   •  Trexall 10 MG tablet, , Disp: , Rfl:     Allergies   Allergen Reactions   • Penicillins        OBJECTIVE  Vitals:   Vitals:    08/15/23 1329   BP: 130/70   BP Location: Right arm   Patient Position: Sitting   Cuff Size: Large   Pulse: 84   Resp: 18   Temp: 98.7 °F (37.1 °C)   Weight: 129 kg (284 lb)   Height: 5' 6" (1.676 m)         Physical Exam  Vitals reviewed. Constitutional:       Appearance: He is well-developed. HENT:      Head: Normocephalic and atraumatic. Eyes:      Conjunctiva/sclera: Conjunctivae normal.      Pupils: Pupils are equal, round, and reactive to light. Cardiovascular:      Rate and Rhythm: Normal rate and regular rhythm. Heart sounds: Normal heart sounds. Pulmonary:      Effort: Pulmonary effort is normal. No respiratory distress. Breath sounds: Normal breath sounds. Musculoskeletal:         General: Normal range of motion. Cervical back: Normal range of motion and neck supple.       Comments: Slow gait secondary to significant pain in bilateral knees  lymphedema present on both sides of lower extremities   Skin:     General: Skin is warm. Capillary Refill: Capillary refill takes less than 2 seconds. Neurological:      Mental Status: He is alert and oriented to person, place, and time.                     Shaw Lo MD,   [facility]  8/15/2023

## 2023-08-15 NOTE — Clinical Note
Patient was given [facility] by his former PCP. But never picked it up given the shortage Cardiology  [phone]!  And hematology  Dr Shane Petty [phone]

## 2023-08-16 ENCOUNTER — OFFICE VISIT (OUTPATIENT)
Dept: OBGYN CLINIC | Facility: CLINIC | Age: 73
End: 2023-08-16
Payer: MEDICARE

## 2023-08-16 ENCOUNTER — APPOINTMENT (OUTPATIENT)
Dept: LAB | Facility: CLINIC | Age: 73
End: 2023-08-16
Payer: MEDICARE

## 2023-08-16 ENCOUNTER — TELEPHONE (OUTPATIENT)
Dept: ADMINISTRATIVE | Facility: OTHER | Age: 73
End: 2023-08-16

## 2023-08-16 VITALS
HEIGHT: 68 IN | DIASTOLIC BLOOD PRESSURE: 74 MMHG | HEART RATE: 79 BPM | SYSTOLIC BLOOD PRESSURE: 138 MMHG | BODY MASS INDEX: 42.89 KG/M2 | WEIGHT: 283 LBS

## 2023-08-16 DIAGNOSIS — I89.0 LYMPHEDEMA: ICD-10-CM

## 2023-08-16 DIAGNOSIS — I48.91 ATRIAL FIBRILLATION, UNSPECIFIED TYPE (HCC): ICD-10-CM

## 2023-08-16 DIAGNOSIS — M25.562 CHRONIC PAIN OF LEFT KNEE: ICD-10-CM

## 2023-08-16 DIAGNOSIS — G89.29 CHRONIC PAIN OF LEFT KNEE: ICD-10-CM

## 2023-08-16 DIAGNOSIS — M17.12 PRIMARY OSTEOARTHRITIS OF LEFT KNEE: Primary | ICD-10-CM

## 2023-08-16 LAB
ALBUMIN SERPL BCP-MCNC: 4 G/DL (ref 3.5–5)
ALP SERPL-CCNC: 72 U/L (ref 46–116)
ALT SERPL W P-5'-P-CCNC: 34 U/L (ref 12–78)
ANION GAP SERPL CALCULATED.3IONS-SCNC: 5 MMOL/L
AST SERPL W P-5'-P-CCNC: 24 U/L (ref 5–45)
BASOPHILS # BLD AUTO: 0.01 THOUSANDS/ÂΜL (ref 0–0.1)
BASOPHILS NFR BLD AUTO: 0 % (ref 0–1)
BILIRUB SERPL-MCNC: 0.52 MG/DL (ref 0.2–1)
BUN SERPL-MCNC: 20 MG/DL (ref 5–25)
CALCIUM SERPL-MCNC: 8.9 MG/DL (ref 8.3–10.1)
CHLORIDE SERPL-SCNC: 109 MMOL/L (ref 96–108)
CO2 SERPL-SCNC: 26 MMOL/L (ref 21–32)
CREAT SERPL-MCNC: 1.07 MG/DL (ref 0.6–1.3)
EOSINOPHIL # BLD AUTO: 0.03 THOUSAND/ÂΜL (ref 0–0.61)
EOSINOPHIL NFR BLD AUTO: 1 % (ref 0–6)
ERYTHROCYTE [DISTWIDTH] IN BLOOD BY AUTOMATED COUNT: 14.6 % (ref 11.6–15.1)
GFR SERPL CREATININE-BSD FRML MDRD: 68 ML/MIN/1.73SQ M
GLUCOSE P FAST SERPL-MCNC: 123 MG/DL (ref 65–99)
HCT VFR BLD AUTO: 41.4 % (ref 36.5–49.3)
HGB BLD-MCNC: 13.3 G/DL (ref 12–17)
IMM GRANULOCYTES # BLD AUTO: 0.03 THOUSAND/UL (ref 0–0.2)
IMM GRANULOCYTES NFR BLD AUTO: 1 % (ref 0–2)
LYMPHOCYTES # BLD AUTO: 0.54 THOUSANDS/ÂΜL (ref 0.6–4.47)
LYMPHOCYTES NFR BLD AUTO: 17 % (ref 14–44)
MCH RBC QN AUTO: 33.6 PG (ref 26.8–34.3)
MCHC RBC AUTO-ENTMCNC: 32.1 G/DL (ref 31.4–37.4)
MCV RBC AUTO: 105 FL (ref 82–98)
MONOCYTES # BLD AUTO: 0.46 THOUSAND/ÂΜL (ref 0.17–1.22)
MONOCYTES NFR BLD AUTO: 14 % (ref 4–12)
NEUTROPHILS # BLD AUTO: 2.21 THOUSANDS/ÂΜL (ref 1.85–7.62)
NEUTS SEG NFR BLD AUTO: 67 % (ref 43–75)
NRBC BLD AUTO-RTO: 0 /100 WBCS
PLATELET # BLD AUTO: 201 THOUSANDS/UL (ref 149–390)
PMV BLD AUTO: 9.8 FL (ref 8.9–12.7)
POTASSIUM SERPL-SCNC: 3.7 MMOL/L (ref 3.5–5.3)
PROT SERPL-MCNC: 7.4 G/DL (ref 6.4–8.4)
RBC # BLD AUTO: 3.96 MILLION/UL (ref 3.88–5.62)
SODIUM SERPL-SCNC: 140 MMOL/L (ref 135–147)
TSH SERPL DL<=0.05 MIU/L-ACNC: 0.85 UIU/ML (ref 0.45–4.5)
WBC # BLD AUTO: 3.28 THOUSAND/UL (ref 4.31–10.16)

## 2023-08-16 PROCEDURE — 99214 OFFICE O/P EST MOD 30 MIN: CPT | Performed by: ORTHOPAEDIC SURGERY

## 2023-08-16 PROCEDURE — 20610 DRAIN/INJ JOINT/BURSA W/O US: CPT | Performed by: ORTHOPAEDIC SURGERY

## 2023-08-16 PROCEDURE — 80053 COMPREHEN METABOLIC PANEL: CPT

## 2023-08-16 PROCEDURE — 84443 ASSAY THYROID STIM HORMONE: CPT

## 2023-08-16 PROCEDURE — 85025 COMPLETE CBC W/AUTO DIFF WBC: CPT

## 2023-08-16 PROCEDURE — 36415 COLL VENOUS BLD VENIPUNCTURE: CPT

## 2023-08-16 RX ORDER — TRIAMCINOLONE ACETONIDE 40 MG/ML
80 INJECTION, SUSPENSION INTRA-ARTICULAR; INTRAMUSCULAR
Status: COMPLETED | OUTPATIENT
Start: 2023-08-16 | End: 2023-08-16

## 2023-08-16 RX ORDER — BUPIVACAINE HYDROCHLORIDE 5 MG/ML
2 INJECTION, SOLUTION EPIDURAL; INTRACAUDAL
Status: COMPLETED | OUTPATIENT
Start: 2023-08-16 | End: 2023-08-16

## 2023-08-16 RX ADMIN — BUPIVACAINE HYDROCHLORIDE 2 ML: 5 INJECTION, SOLUTION EPIDURAL; INTRACAUDAL at 08:15

## 2023-08-16 RX ADMIN — TRIAMCINOLONE ACETONIDE 80 MG: 40 INJECTION, SUSPENSION INTRA-ARTICULAR; INTRAMUSCULAR at 08:15

## 2023-08-16 NOTE — PROGRESS NOTES
Assessment/Plan:  1. Primary osteoarthritis of left knee  Large joint arthrocentesis: L knee      2. Chronic pain of left knee  Large joint arthrocentesis: L knee      3. Adult BMI 40.0-44.9 kg/sq m ([address])        4. Lymphedema          Scribe Attestation    I,:  Gilberto Bryan PA-C am acting as a scribe while in the presence of the attending physician.:       I,:  Branden Raza,  personally performed the services described in this documentation    as scribed in my presence.:         Angela Brooks is a pleasant 58-year-old presenting today for follow-up of his active related left knee pain with known severe underlying osteoarthritis. We have applauded his weight loss efforts as his BMI has dropped from 46 to about 43. We discussed that he would still need to lose approximately 21 more pounds before having a BMI appropriate to proceed with total knee arthroplasty. He will continue to work with his primary care physician in an effort to lose weight. In order to help him with his discomfort, we agreed to perform a cortisone injection. He consented to and underwent a left knee cortisone injection as detailed below, which she tolerated well without difficulty or complication. Postinjection instructions were provided. We will see him back in 3 to 4 months for reevaluation and a repeat weight check. All questions addressed    Large joint arthrocentesis: L knee  Universal Protocol:  Consent: Verbal consent obtained. Risks and benefits: risks, benefits and alternatives were discussed  Consent given by: patient  Time out: Immediately prior to procedure a "time out" was called to verify the correct patient, procedure, equipment, support staff and site/side marked as required.   Timeout called at: 8/16/2023 8:35 AM.  Site marked: the operative site was marked  Patient identity confirmed: verbally with patient    Supporting Documentation  Indications: pain   Procedure Details  Location: knee - L knee  Preparation: Patient was prepped and draped in the usual sterile fashion  Needle size: 20 G  Ultrasound guidance: no  Approach: anterolateral  Medications administered: 80 mg triamcinolone acetonide 40 mg/mL; 2 mL bupivacaine (PF) 0.5 %    Patient tolerance: patient tolerated the procedure well with no immediate complications  Dressing:  Sterile dressing applied          Subjective: Left knee follow up    Patient ID: Willie Mack is a 75 y.o. male presenting today for follow-up of his active related left knee pain with known severe underlying osteoarthritis. He has been working diligently to lose weight. He has spoken with his primary care provider and they are working on water retention. He continues to have activity related pain in the left knee on a daily basis. He denies any new injuries or falls    Review of Systems   Constitutional: Positive for activity change. HENT: Negative. Eyes: Negative. Respiratory: Negative. Cardiovascular: Positive for leg swelling. Gastrointestinal: Negative. Endocrine: Negative. Genitourinary: Negative. Musculoskeletal: Positive for arthralgias, gait problem, joint swelling and myalgias. Skin: Negative. Allergic/Immunologic: Negative. Hematological: Negative. Psychiatric/Behavioral: Negative. Past Medical History:   Diagnosis Date   • Cardiac disease     atrial fib,pacemaker   • Hyperlipidemia    • Hypertension        Past Surgical History:   Procedure Laterality Date   • FL INJECTION LEFT KNEE (ARTHROGRAM)  12/6/2022       History reviewed. No pertinent family history.     Social History     Occupational History   • Not on file   Tobacco Use   • Smoking status: Former   • Smokeless tobacco: Never   Vaping Use   • Vaping Use: Never used   Substance and Sexual Activity   • Alcohol use: Yes     Comment: occas   • Drug use: Never   • Sexual activity: Not on file         Current Outpatient Medications:   •  alfuzosin (UROXATRAL) 10 mg 24 hr tablet, Take 10 mg by mouth daily, Disp: , Rfl:   •  amiodarone 200 mg tablet, , Disp: , Rfl:   •  apixaban (ELIQUIS) 5 mg, Take 5 mg by mouth 2 (two) times a day, Disp: , Rfl:   •  diltiazem (CARDIZEM CD) 120 mg 24 hr capsule, , Disp: , Rfl:   •  finasteride (PROSCAR) 5 mg tablet, , Disp: , Rfl:   •  folic acid (FOLVITE) 1 mg tablet, , Disp: , Rfl:   •  furosemide (LASIX) 40 mg tablet, , Disp: , Rfl:   •  metoprolol tartrate (LOPRESSOR) 50 mg tablet, Take 25 mg by mouth every 12 (twelve) hours, Disp: , Rfl:   •  Multiple Vitamin (MULTIVITAMIN ADULT PO), Take by mouth, Disp: , Rfl:   •  Multiple Vitamins-Minerals (PRESERVISION AREDS PO), Take by mouth, Disp: , Rfl:   •  omeprazole (PriLOSEC) 40 MG capsule, Take 40 mg by mouth daily, Disp: , Rfl:   •  prednisoLONE acetate (PRED FORTE) 1 % ophthalmic suspension, , Disp: , Rfl:   •  rOPINIRole (REQUIP) 3 mg tablet, , Disp: , Rfl:   •  simvastatin (ZOCOR) 40 mg tablet, Take 40 mg by mouth daily at bedtime, Disp: , Rfl:   •  Trexall 10 MG tablet, , Disp: , Rfl:     Allergies   Allergen Reactions   • Penicillins        Objective:  Vitals:    08/16/23 0802   BP: 138/74   Pulse: 79       Body mass index is 43.03 kg/m². Left Knee Exam     Muscle Strength   The patient has normal left knee strength. Tenderness   The patient is experiencing tenderness in the medial joint line and patella. Range of Motion   Extension:  0 normal   Flexion:  120 normal     Tests   Varus: negative Valgus: negative  Drawer:  Anterior - negative       Patellar apprehension: negative    Other   Erythema: absent  Scars: absent  Sensation: normal  Pulse: present  Swelling: none  Effusion: no effusion present    Comments:  5 degree varus deformity that is passively correctable   + patellofemoral grind and crepitance  Collateral ligament stable at 0, 30, 90          Observations   Left Knee   Negative for effusion. Physical Exam  Vitals and nursing note reviewed.    Constitutional:       Appearance: Normal appearance. He is well-developed. Comments: Body mass index is 43.03 kg/m². HENT:      Head: Normocephalic and atraumatic. Right Ear: External ear normal.      Left Ear: External ear normal.   Eyes:      Extraocular Movements: Extraocular movements intact. Conjunctiva/sclera: Conjunctivae normal.   Cardiovascular:      Rate and Rhythm: Normal rate. Pulses: Normal pulses. Pulmonary:      Effort: Pulmonary effort is normal.   Musculoskeletal:      Cervical back: Normal range of motion. Left knee: No effusion. Comments: See ortho exam   Skin:     General: Skin is warm and dry. Neurological:      General: No focal deficit present. Mental Status: He is alert and oriented to person, place, and time. Mental status is at baseline. Psychiatric:         Mood and Affect: Mood normal.         Behavior: Behavior normal.         Thought Content: Thought content normal.         Judgment: Judgment normal.         This document was created using speech voice recognition software. Grammatical errors, random word insertions, pronoun errors, and incomplete sentences are an occasional consequence of this system due to software limitations, ambient noise, and hardware issues. Any formal questions or concerns about content, text, or information contained within the body of this dictation should be directly addressed to the provider for clarification.

## 2023-08-16 NOTE — TELEPHONE ENCOUNTER
Upon review of the In Basket request and the patient's chart, initial outreach has been made via fax to facility. Please see Contacts section for details.      Thank you  Zabrina Prim

## 2023-08-16 NOTE — TELEPHONE ENCOUNTER
----- Message from Carrie Luis sent at 8/15/2023  1:37 PM EDT -----  Regarding: colonoscopy  08/15/23 1:37 PM    Hello, our patient attached above has had CRC: Colonoscopy completed/performed. Please assist in updating the patient chart by making an External outreach to [facility] located in [facility]. The date of service is [id number].     Thank you,  Carrie TREVINO

## 2023-08-16 NOTE — LETTER
Procedure Request Form: Colonoscopy      Date Requested: 23  Patient: Marya Page  Patient : 1950   Referring Provider: Gerry Henderson MD        Date of Procedure ______________________________       The above patient has informed us that they have completed their   most recent Colonoscopy at your facility. Please complete   this form and attach all corresponding procedure reports/results. Comments __________________________________________________________  ____________________________________________________________________  ____________________________________________________________________  ____________________________________________________________________    Facility Completing Procedure _________________________________________    Form Completed By (print name) _______________________________________      Signature __________________________________________________________      These reports are needed for  compliance. Please fax this completed form and a copy of the procedure report to our office located at [address] [place] [address] as soon as possible to Fax [phone] rachana Giang: Phone [phone]    We thank you for your assistance in treating our mutual patient.

## 2023-08-17 ENCOUNTER — TELEPHONE (OUTPATIENT)
Dept: FAMILY MEDICINE CLINIC | Facility: CLINIC | Age: 73
End: 2023-08-17

## 2023-08-17 DIAGNOSIS — R73.09 ABNORMAL GLUCOSE: ICD-10-CM

## 2023-08-17 NOTE — TELEPHONE ENCOUNTER
Upon review of the In Basket request we were able to locate, review, and update the patient chart as requested for CRC: Colonoscopy. Any additional questions or concerns should be emailed to the Practice Liaisons via the appropriate education email address, please do not reply via In Basket.     Thank you  Colleen Wilhelm

## 2023-08-29 ENCOUNTER — TELEPHONE (OUTPATIENT)
Dept: FAMILY MEDICINE CLINIC | Facility: CLINIC | Age: 73
End: 2023-08-29

## 2023-08-29 NOTE — TELEPHONE ENCOUNTER
PA NEEDED for wegovy  KEY [id number]  Pre Diabetes  Afib    History of being approved, but didn't start given that he was unsure at that time.

## 2023-09-01 ENCOUNTER — TELEPHONE (OUTPATIENT)
Dept: FAMILY MEDICINE CLINIC | Facility: CLINIC | Age: 73
End: 2023-09-01

## 2023-09-05 NOTE — TELEPHONE ENCOUNTER
I did phone pharm. It was denied and I was also told that even if it went through it would go in the que because it is not available.

## 2023-10-10 ENCOUNTER — HOSPITAL ENCOUNTER (INPATIENT)
Facility: HOSPITAL | Age: 73
LOS: 1 days | Discharge: HOME | End: 2023-10-11
Attending: EMERGENCY MEDICINE | Admitting: INTERNAL MEDICINE
Payer: MEDICARE

## 2023-10-10 ENCOUNTER — APPOINTMENT (EMERGENCY)
Dept: RADIOLOGY | Facility: HOSPITAL | Age: 73
End: 2023-10-10
Payer: MEDICARE

## 2023-10-10 DIAGNOSIS — I48.91 ATRIAL FIBRILLATION WITH RVR (HCC): ICD-10-CM

## 2023-10-10 DIAGNOSIS — I48.91 ATRIAL FIBRILLATION, UNSPECIFIED TYPE (HCC): Primary | ICD-10-CM

## 2023-10-10 PROBLEM — Z86.79 HISTORY OF SICK SINUS SYNDROME: Status: ACTIVE | Noted: 2023-10-10

## 2023-10-10 LAB
2HR DELTA HS TROPONIN: -1 NG/L
ALBUMIN SERPL BCP-MCNC: 4.2 G/DL (ref 3.5–5)
ALP SERPL-CCNC: 65 U/L (ref 34–104)
ALT SERPL W P-5'-P-CCNC: 26 U/L (ref 7–52)
ANION GAP SERPL CALCULATED.3IONS-SCNC: 10 MMOL/L
APTT PPP: 30 SECONDS (ref 23–37)
AST SERPL W P-5'-P-CCNC: 21 U/L (ref 13–39)
BASOPHILS # BLD AUTO: 0.01 THOUSANDS/ÂΜL (ref 0–0.1)
BASOPHILS NFR BLD AUTO: 0 % (ref 0–1)
BILIRUB SERPL-MCNC: 0.6 MG/DL (ref 0.2–1)
BUN SERPL-MCNC: 18 MG/DL (ref 5–25)
CALCIUM SERPL-MCNC: 8.6 MG/DL (ref 8.4–10.2)
CARDIAC TROPONIN I PNL SERPL HS: 4 NG/L
CARDIAC TROPONIN I PNL SERPL HS: 5 NG/L
CHLORIDE SERPL-SCNC: 106 MMOL/L (ref 96–108)
CO2 SERPL-SCNC: 25 MMOL/L (ref 21–32)
CREAT SERPL-MCNC: 0.96 MG/DL (ref 0.6–1.3)
EOSINOPHIL # BLD AUTO: 0.02 THOUSAND/ÂΜL (ref 0–0.61)
EOSINOPHIL NFR BLD AUTO: 1 % (ref 0–6)
ERYTHROCYTE [DISTWIDTH] IN BLOOD BY AUTOMATED COUNT: 15.7 % (ref 11.6–15.1)
FLUAV RNA RESP QL NAA+PROBE: NEGATIVE
FLUBV RNA RESP QL NAA+PROBE: NEGATIVE
GFR SERPL CREATININE-BSD FRML MDRD: 77 ML/MIN/1.73SQ M
GLUCOSE SERPL-MCNC: 119 MG/DL (ref 65–140)
HCT VFR BLD AUTO: 39.5 % (ref 36.5–49.3)
HGB BLD-MCNC: 13.7 G/DL (ref 12–17)
IMM GRANULOCYTES # BLD AUTO: 0.04 THOUSAND/UL (ref 0–0.2)
IMM GRANULOCYTES NFR BLD AUTO: 1 % (ref 0–2)
INR PPP: 1.17 (ref 0.84–1.19)
LYMPHOCYTES # BLD AUTO: 0.51 THOUSANDS/ÂΜL (ref 0.6–4.47)
LYMPHOCYTES NFR BLD AUTO: 13 % (ref 14–44)
MAGNESIUM SERPL-MCNC: 1.9 MG/DL (ref 1.9–2.7)
MCH RBC QN AUTO: 35.3 PG (ref 26.8–34.3)
MCHC RBC AUTO-ENTMCNC: 34.7 G/DL (ref 31.4–37.4)
MCV RBC AUTO: 102 FL (ref 82–98)
MONOCYTES # BLD AUTO: 0.44 THOUSAND/ÂΜL (ref 0.17–1.22)
MONOCYTES NFR BLD AUTO: 11 % (ref 4–12)
NEUTROPHILS # BLD AUTO: 2.93 THOUSANDS/ÂΜL (ref 1.85–7.62)
NEUTS SEG NFR BLD AUTO: 74 % (ref 43–75)
NRBC BLD AUTO-RTO: 0 /100 WBCS
PLATELET # BLD AUTO: 201 THOUSANDS/UL (ref 149–390)
PMV BLD AUTO: 8.9 FL (ref 8.9–12.7)
POTASSIUM SERPL-SCNC: 3.7 MMOL/L (ref 3.5–5.3)
PROT SERPL-MCNC: 6.2 G/DL (ref 6.4–8.4)
PROTHROMBIN TIME: 15.1 SECONDS (ref 11.6–14.5)
RBC # BLD AUTO: 3.88 MILLION/UL (ref 3.88–5.62)
RSV RNA RESP QL NAA+PROBE: NEGATIVE
SARS-COV-2 RNA RESP QL NAA+PROBE: NEGATIVE
SODIUM SERPL-SCNC: 141 MMOL/L (ref 135–147)
TSH SERPL DL<=0.05 MIU/L-ACNC: 0.59 UIU/ML (ref 0.45–4.5)
WBC # BLD AUTO: 3.95 THOUSAND/UL (ref 4.31–10.16)

## 2023-10-10 PROCEDURE — 93005 ELECTROCARDIOGRAM TRACING: CPT

## 2023-10-10 PROCEDURE — 99223 1ST HOSP IP/OBS HIGH 75: CPT | Performed by: INTERNAL MEDICINE

## 2023-10-10 PROCEDURE — 85025 COMPLETE CBC W/AUTO DIFF WBC: CPT | Performed by: EMERGENCY MEDICINE

## 2023-10-10 PROCEDURE — 99285 EMERGENCY DEPT VISIT HI MDM: CPT

## 2023-10-10 PROCEDURE — 96374 THER/PROPH/DIAG INJ IV PUSH: CPT

## 2023-10-10 PROCEDURE — 90662 IIV NO PRSV INCREASED AG IM: CPT | Performed by: INTERNAL MEDICINE

## 2023-10-10 PROCEDURE — 85730 THROMBOPLASTIN TIME PARTIAL: CPT | Performed by: EMERGENCY MEDICINE

## 2023-10-10 PROCEDURE — 99291 CRITICAL CARE FIRST HOUR: CPT | Performed by: EMERGENCY MEDICINE

## 2023-10-10 PROCEDURE — G0008 ADMIN INFLUENZA VIRUS VAC: HCPCS | Performed by: INTERNAL MEDICINE

## 2023-10-10 PROCEDURE — 0241U HB NFCT DS VIR RESP RNA 4 TRGT: CPT | Performed by: EMERGENCY MEDICINE

## 2023-10-10 PROCEDURE — 84443 ASSAY THYROID STIM HORMONE: CPT | Performed by: EMERGENCY MEDICINE

## 2023-10-10 PROCEDURE — 85610 PROTHROMBIN TIME: CPT | Performed by: EMERGENCY MEDICINE

## 2023-10-10 PROCEDURE — 83735 ASSAY OF MAGNESIUM: CPT | Performed by: EMERGENCY MEDICINE

## 2023-10-10 PROCEDURE — 84484 ASSAY OF TROPONIN QUANT: CPT | Performed by: EMERGENCY MEDICINE

## 2023-10-10 PROCEDURE — 87081 CULTURE SCREEN ONLY: CPT | Performed by: INTERNAL MEDICINE

## 2023-10-10 PROCEDURE — 96376 TX/PRO/DX INJ SAME DRUG ADON: CPT

## 2023-10-10 PROCEDURE — 36415 COLL VENOUS BLD VENIPUNCTURE: CPT | Performed by: EMERGENCY MEDICINE

## 2023-10-10 PROCEDURE — 80053 COMPREHEN METABOLIC PANEL: CPT | Performed by: EMERGENCY MEDICINE

## 2023-10-10 PROCEDURE — 71045 X-RAY EXAM CHEST 1 VIEW: CPT

## 2023-10-10 RX ORDER — PANTOPRAZOLE SODIUM 40 MG/1
40 TABLET, DELAYED RELEASE ORAL
Status: DISCONTINUED | OUTPATIENT
Start: 2023-10-11 | End: 2023-10-11 | Stop reason: HOSPADM

## 2023-10-10 RX ORDER — FUROSEMIDE 40 MG/1
40 TABLET ORAL DAILY
Status: DISCONTINUED | OUTPATIENT
Start: 2023-10-11 | End: 2023-10-11 | Stop reason: HOSPADM

## 2023-10-10 RX ORDER — METOPROLOL TARTRATE 5 MG/5ML
5 INJECTION INTRAVENOUS ONCE
Status: COMPLETED | OUTPATIENT
Start: 2023-10-10 | End: 2023-10-10

## 2023-10-10 RX ORDER — ROPINIROLE 1 MG/1
2 TABLET, FILM COATED ORAL
Status: DISCONTINUED | OUTPATIENT
Start: 2023-10-10 | End: 2023-10-11 | Stop reason: HOSPADM

## 2023-10-10 RX ORDER — TAMSULOSIN HYDROCHLORIDE 0.4 MG/1
0.4 CAPSULE ORAL
Status: DISCONTINUED | OUTPATIENT
Start: 2023-10-10 | End: 2023-10-11 | Stop reason: HOSPADM

## 2023-10-10 RX ORDER — METOPROLOL TARTRATE 5 MG/5ML
5 INJECTION INTRAVENOUS ONCE
Status: DISCONTINUED | OUTPATIENT
Start: 2023-10-10 | End: 2023-10-10

## 2023-10-10 RX ORDER — PRAVASTATIN SODIUM 80 MG/1
80 TABLET ORAL
Status: DISCONTINUED | OUTPATIENT
Start: 2023-10-10 | End: 2023-10-11 | Stop reason: HOSPADM

## 2023-10-10 RX ADMIN — TAMSULOSIN HYDROCHLORIDE 0.4 MG: 0.4 CAPSULE ORAL at 16:05

## 2023-10-10 RX ADMIN — APIXABAN 5 MG: 5 TABLET, FILM COATED ORAL at 17:39

## 2023-10-10 RX ADMIN — AMIODARONE HYDROCHLORIDE 1 MG/MIN: 50 INJECTION, SOLUTION INTRAVENOUS at 12:10

## 2023-10-10 RX ADMIN — ROPINIROLE 2 MG: 1 TABLET, FILM COATED ORAL at 20:56

## 2023-10-10 RX ADMIN — AMIODARONE HYDROCHLORIDE 0.5 MG/MIN: 50 INJECTION, SOLUTION INTRAVENOUS at 20:54

## 2023-10-10 RX ADMIN — AMIODARONE HYDROCHLORIDE 150 MG: 50 INJECTION, SOLUTION INTRAVENOUS at 14:35

## 2023-10-10 RX ADMIN — METOPROLOL TARTRATE 25 MG: 25 TABLET, FILM COATED ORAL at 13:42

## 2023-10-10 RX ADMIN — AMIODARONE HYDROCHLORIDE 1 MG/MIN: 50 INJECTION, SOLUTION INTRAVENOUS at 15:17

## 2023-10-10 RX ADMIN — INFLUENZA A VIRUS A/VICTORIA/4897/2022 IVR-238 (H1N1) ANTIGEN (FORMALDEHYDE INACTIVATED), INFLUENZA A VIRUS A/DARWIN/9/2021 SAN-010 (H3N2) ANTIGEN (FORMALDEHYDE INACTIVATED), INFLUENZA B VIRUS B/PHUKET/3073/2013 ANTIGEN (FORMALDEHYDE INACTIVATED), AND INFLUENZA B VIRUS B/MICHIGAN/01/2021 ANTIGEN (FORMALDEHYDE INACTIVATED) 0.7 ML: 60; 60; 60; 60 INJECTION, SUSPENSION INTRAMUSCULAR at 13:44

## 2023-10-10 RX ADMIN — METOPROLOL TARTRATE 3 MG: 5 INJECTION INTRAVENOUS at 10:42

## 2023-10-10 RX ADMIN — APIXABAN 5 MG: 5 TABLET, FILM COATED ORAL at 13:40

## 2023-10-10 RX ADMIN — METOPROLOL TARTRATE 5 MG: 5 INJECTION INTRAVENOUS at 09:33

## 2023-10-10 RX ADMIN — PRAVASTATIN SODIUM 80 MG: 80 TABLET ORAL at 16:05

## 2023-10-10 NOTE — ED NOTES
Awaiting ICU readiness for the pt, Charge RN ICU contacted via TT     Denise Dunbar RN  10/10/23 1051

## 2023-10-10 NOTE — ED NOTES
Pt transported to ICU 10 via stretcher by this RN and Bry Bolton. Verbal report given to Onel Joseph RN at bedside. All belongings are sent with the pt.       Debi Dakins, RN  10/10/23 2254

## 2023-10-10 NOTE — PLAN OF CARE
Problem: Prexisting or High Potential for Compromised Skin Integrity  Goal: Skin integrity is maintained or improved  Description: INTERVENTIONS:  - Identify patients at risk for skin breakdown  - Assess and monitor skin integrity  - Assess and monitor nutrition and hydration status  - Monitor labs   - Assess for incontinence   - Turn and reposition patient  - Assist with mobility/ambulation  - Relieve pressure over bony prominences  - Avoid friction and shearing  - Provide appropriate hygiene as needed including keeping skin clean and dry  - Evaluate need for skin moisturizer/barrier cream  - Collaborate with interdisciplinary team   - Patient/family teaching  - Consider wound care consult   Outcome: Progressing     Problem: MOBILITY - ADULT  Goal: Maintain or return to baseline ADL function  Description: INTERVENTIONS:  -  Assess patient's ability to carry out ADLs; assess patient's baseline for ADL function and identify physical deficits which impact ability to perform ADLs (bathing, care of mouth/teeth, toileting, grooming, dressing, etc.)  - Assess/evaluate cause of self-care deficits   - Assess range of motion  - Assess patient's mobility; develop plan if impaired  - Assess patient's need for assistive devices and provide as appropriate  - Encourage maximum independence but intervene and supervise when necessary  - Involve family in performance of ADLs  - Assess for home care needs following discharge   - Consider OT consult to assist with ADL evaluation and planning for discharge  - Provide patient education as appropriate  Outcome: Progressing  Goal: Maintains/Returns to pre admission functional level  Description: INTERVENTIONS:  - Perform BMAT or MOVE assessment daily.   - Set and communicate daily mobility goal to care team and patient/family/caregiver. - Collaborate with rehabilitation services on mobility goals if consulted  - Perform Range of Motion 4  times a day.   - Stand patient 4 times a day  - Ambulate patient 3 times a day  - Out of bed to chair 3 times a day   - Out of bed for meals 3 times a day  - Out of bed for toileting  - Record patient progress and toleration of activity level   Outcome: Progressing     Problem: CARDIOVASCULAR - ADULT  Goal: Maintains optimal cardiac output and hemodynamic stability  Description: INTERVENTIONS:  - Monitor I/O, vital signs and rhythm  - Monitor for S/S and trends of decreased cardiac output  - Administer and titrate ordered vasoactive medications to optimize hemodynamic stability  - Assess quality of pulses, skin color and temperature  - Assess for signs of decreased coronary artery perfusion  - Instruct patient to report change in severity of symptoms  Outcome: Progressing  Goal: Absence of cardiac dysrhythmias or at baseline rhythm  Description: INTERVENTIONS:  - Continuous cardiac monitoring, vital signs, obtain 12 lead EKG if ordered  - Administer antiarrhythmic and heart rate control medications as ordered  - Monitor electrolytes and administer replacement therapy as ordered  Outcome: Progressing     Problem: SAFETY ADULT  Goal: Patient will remain free of falls  Description: INTERVENTIONS:  - Educate patient/family on patient safety including physical limitations  - Instruct patient to call for assistance with activity   - Consult OT/PT to assist with strengthening/mobility   - Keep Call bell within reach  - Keep bed low and locked with side rails adjusted as appropriate  - Keep care items and personal belongings within reach  - Initiate and maintain comfort rounds  - Make Fall Risk Sign visible to staff  - Offer Toileting every 2  Hours, in advance of need  - Initiate/Maintain bed alarm  - Apply yellow socks and bracelet for high fall risk patients  - Consider moving patient to room near nurses station  Outcome: Progressing     Problem: Knowledge Deficit  Goal: Patient/family/caregiver demonstrates understanding of disease process, treatment plan, medications, and discharge instructions  Description: Complete learning assessment and assess knowledge base.   Interventions:  - Provide teaching at level of understanding  - Provide teaching via preferred learning methods  Outcome: Progressing

## 2023-10-10 NOTE — ASSESSMENT & PLAN NOTE
Patient presented to ED with intermittent, sharp, right-sided chest pain associated with fatigue and lightheadedness. EKG: atrial fibrillation with RVR   CXR: No acute cardiopulmonary disease.   · Initial troponin negative  · Home medications: amiodarone 200 mg daily, cardizem 120 mg daily, and Lopressor 25 mg bid  · Received Lopressor 5 mg x 2 in ED  · Cardiology consulted  · Started on amiodarone gtt  · Continue home Lopressor  · Plan for cardioversion tomorrow  · Continue Eliquis  · Monitor on telemetry

## 2023-10-10 NOTE — CONSULTS
Consultation - Cardiology   [facility]     Bethel Tafoya 75 y.o. male MRN: [id number]  : 1950  Unit/Bed#: ICU 10 Encounter: [id number]      Assessment & Plan   1. Atrial fibrillation with RVR.    - Patient has a known history of paroxysmal atrial fibrillation. He reports he has been cardioverted in the past.  - 10/10/23 EKG: Atrial fibrillation with rapid ventricular response, ventricular rate 126 bpm.  RBBB. - Review of home medications notes that patient is on Cardizem 120 mg daily, Lopressor 25 mg twice daily, and Lasix 40 mg daily. - Will restart Lopressor 25 mg twice daily. - Will start amiodarone IV with bolus. - Continue Lasix 40 mg oral daily.  - Hold Cardizem at this time given amiodarone drip. - [id number] stroke risk score: 1 point, low-moderate risk. - Continue Eliquis 5 mg twice daily. Patient states he has not had any interruptions in his anticoagulation.  - Ordered overnight pulse oximetry to screen for obstructive sleep apnea. Patient reports no known history of obstructive sleep apnea. - 10/10/23 TSH: 0.594.  - Follow up 10/10/23 TTE.   - Plan for cardioversion tomorrow 10/11/2023. NPO at midnight. 2. Sick sinus syndrome s/p PPM implantation.    - s/p dual-chamber PPM implantation (10/2016). - Patient reports frequent device interrogations with primary cardiologist, Dr. Elsa Marquez. 3. Valvular heart disease. - 22 TTE: Mild aortic valve regurgitation, mild aortic valve stenosis. Mild to moderate mitral valve regurgitation. Moderate tricuspid valve regurgitation. Trace phonic valve regurgitation. (scanned in media section). - Follow up 10/10/23 TTE. 4. Pulmonary hypertension.    - 22 TTE: moderate pulmonary hypertension.  - Follow up 10/10/23 TTE. 5. Hypertension.    - BP acceptable. - Review of home medications notes that patient is on Cardizem 120 mg daily, Lopressor 25 mg twice daily, and Lasix 40 mg daily.   - Will restart Lopressor 25 mg twice daily. - Continue Lasix 40 mg oral daily.  - Hold Cardizem at this time given amiodarone drip. 6. Hyperlipidemia.    - No documented lipid panel in chart. Will order.  - Continue pravastatin 80 mg daily. 7. Left ventricular hypertrophy. - 2/02/22 TTE: Left ventricular hypertrophy. (scanned in media section). - Follow up 10/10/23 TTE. 8. Leukemia.    - In remission.  - Follows outpatient with [facility] blood and cancer specialist.    9. Chronic lymphedema.    - Patient with documented history of chronic lymphedema.  - Follows outpatient with [facility] vascular surgery. Summary of Recommendations: Thank you for your consultation. Physician Requesting Consult: Darcy Henderson, *    Reason for Consult / Principal Problem: Atrial fibrillation with rapid ventricular response. Inpatient consult to Cardiology  Consult performed by: Ashley Troy PA-C  Consult ordered by: Thiago Cortés PA-C          HPI: Eleni Middleton is a 75y.o. year old male with PMHx of paroxysmal atrial fibrillation (on Eliquis), sinus syndrome s/p PPM implantation (2016), valvular heart disease, pulmonary hypertension, HTN, HLD, LVH, leukemia (in remission), lymphedema, who presented on 10/10/23 evaluation for chest pain. Patient reports he had a short episode of chest pain this morning that only lasted a few minutes. Describes chest pain as a sharp stabbing sensation localized to the center of his chest which resolved without any intervention within a few minutes. He states he had a similar episode when he into atrial fibrillation a few years ago. He denies chest pain episode was associated with palpitations, shortness of breath, lightheadedness, dizziness, headache, nausea, or vomiting. On arrival to ED, patient noted to be in atrial fibrillation with RVR. Patient reports he follows outpatient with cardiologist Dr. Renetta Walter in [place], [place].   Patient reports he is compliant with his medications. Review of Systems   Constitutional: Negative for activity change, appetite change, chills, diaphoresis, fatigue, fever and unexpected weight change. Respiratory: Negative for apnea, cough, chest tightness, shortness of breath and wheezing. Cardiovascular: Positive for chest pain and leg swelling ( Chronic.). Negative for palpitations. Gastrointestinal: Negative for abdominal distention, abdominal pain, constipation, diarrhea, nausea and vomiting. Skin: Negative. Neurological: Negative for dizziness, syncope, weakness, light-headedness, numbness and headaches. Historical Information   Past Medical History:   Diagnosis Date   • Cardiac disease     atrial fib,pacemaker   • Hyperlipidemia    • Hypertension      Past Surgical History:   Procedure Laterality Date   • FL INJECTION LEFT KNEE (ARTHROGRAM)  12/6/2022     Social History     Substance and Sexual Activity   Alcohol Use Yes    Comment: occas     Social History     Substance and Sexual Activity   Drug Use Yes     Social History     Tobacco Use   Smoking Status Former   Smokeless Tobacco Never     Family History: History reviewed. No pertinent family history.     Meds/Allergies    PTA meds:    Medications Prior to Admission   Medication   • alfuzosin (UROXATRAL) 10 mg 24 hr tablet   • amiodarone 200 mg tablet   • apixaban (ELIQUIS) 5 mg   • diltiazem (CARDIZEM CD) 120 mg 24 hr capsule   • folic acid (FOLVITE) 1 mg tablet   • furosemide (LASIX) 40 mg tablet   • metoprolol tartrate (LOPRESSOR) 50 mg tablet   • Multiple Vitamin (MULTIVITAMIN ADULT PO)   • Multiple Vitamins-Minerals (PRESERVISION AREDS PO)   • omeprazole (PriLOSEC) 40 MG capsule   • prednisoLONE acetate (PRED FORTE) 1 % ophthalmic suspension   • rOPINIRole (REQUIP) 3 mg tablet   • simvastatin (ZOCOR) 40 mg tablet   • finasteride (PROSCAR) 5 mg tablet   • Semaglutide-Weight Management (WEGOVY) 0.25 MG/0.5ML   • Trexall 10 MG tablet      Allergies   Allergen Reactions   • Penicillins        Current Facility-Administered Medications:   •  amiodarone (CORDARONE) 900 mg in dextrose 5 % 500 mL infusion, 1 mg/min, Intravenous, Continuous, Sydnee Landaverde DO, Last Rate: 33.3 mL/hr at 10/10/23 1210, 1 mg/min at 10/10/23 1210  •  apixaban (ELIQUIS) tablet 5 mg, 5 mg, Oral, BID, Giselleberenice Smith, PA-C  •  [START ON 10/11/2023] furosemide (LASIX) tablet 40 mg, 40 mg, Oral, Daily, Giselle Luis, PA-C  •  [START ON 10/11/2023] pantoprazole (PROTONIX) EC tablet 40 mg, 40 mg, Oral, Early Morning, Giselle Luis PA-C  •  pravastatin (PRAVACHOL) tablet 80 mg, 80 mg, Oral, Daily With Dinner, Giselle Smith, PA-C  •  rOPINIRole (REQUIP) tablet 2 mg, 2 mg, Oral, HS, Giselleberenice Smith, PA-C  •  tamsulosin (FLOMAX) capsule 0.4 mg, 0.4 mg, Oral, Daily With Dinner, Giselle Smith, PA-C    VTE Pharmacologic Prophylaxis:   Eliquis    Objective:   Vitals: Blood pressure 138/85, pulse (!) 120, temperature 97.9 °F (36.6 °C), temperature source Temporal, resp. rate (!) 23, height 5' 6" (1.676 m), weight 123 kg (270 lb 4.5 oz), SpO2 98 %. Body mass index is 43.62 kg/m². Wt Readings from Last 3 Encounters:   10/10/23 123 kg (270 lb 4.5 oz)   08/16/23 128 kg (283 lb)   08/15/23 129 kg (284 lb)     BP Readings from Last 3 Encounters:   10/10/23 138/85   08/16/23 138/74   08/15/23 130/70     Orthostatic Blood Pressures    Flowsheet Row Most Recent Value   Blood Pressure 138/85 filed at 10/10/2023 1241   Patient Position - Orthostatic VS Lying filed at 10/10/2023 1040        No intake or output data in the 24 hours ending 10/10/23 1301    Invasive Devices     Peripheral Intravenous Line  Duration           Peripheral IV 10/10/23 Right;Ventral (anterior) Hand <1 day              Physical Exam:   Physical Exam  Vitals reviewed. Constitutional:       General: He is not in acute distress. Appearance: He is obese. Cardiovascular:      Rate and Rhythm: Tachycardia present. Rhythm irregular.       Pulses: Normal pulses. Heart sounds: Murmur heard. Pulmonary:      Effort: Pulmonary effort is normal. No respiratory distress. Breath sounds: Decreased breath sounds present. Abdominal:      General: Abdomen is flat. There is no distension. Palpations: Abdomen is soft. Tenderness: There is no abdominal tenderness. Musculoskeletal:      Right lower leg: Edema present. Left lower leg: Edema present. Skin:     General: Skin is warm and dry. Neurological:      Mental Status: He is alert and oriented to person, place, and time. Labs:   Troponins:  Results from last 7 days   Lab Units 10/10/23  1108   HSTNI D2 ng/L -1       CBC with diff:   Results from last 7 days   Lab Units 10/10/23  0933   WBC Thousand/uL 3.95*   HEMOGLOBIN g/dL 13.7   HEMATOCRIT % 39.5   MCV fL 102*   PLATELETS Thousands/uL 201   RBC Million/uL 3.88   MCH pg 35.3*   MCHC g/dL 34.7   RDW % 15.7*   MPV fL 8.9   NRBC AUTO /100 WBCs 0       CMP:   Results from last 7 days   Lab Units 10/10/23  0933   SODIUM mmol/L 141   POTASSIUM mmol/L 3.7   CHLORIDE mmol/L 106   CO2 mmol/L 25   ANION GAP mmol/L 10   BUN mg/dL 18   CREATININE mg/dL 0.96   CALCIUM mg/dL 8.6   AST U/L 21   ALT U/L 26   ALK PHOS U/L 65   TOTAL PROTEIN g/dL 6.2*   ALBUMIN g/dL 4.2   TOTAL BILIRUBIN mg/dL 0.60   EGFR ml/min/1.73sq m 77   GLUCOSE RANDOM mg/dL 119       Magnesium:   Results from last 7 days   Lab Units 10/10/23  0933   MAGNESIUM mg/dL 1.9     Coags:   Results from last 7 days   Lab Units 10/10/23  0933   PTT seconds 30   INR  1.17     TSH:    Results from last 7 days   Lab Units 10/10/23  0933   TSH 3RD GENERATON uIU/mL 0.594       Imaging & Testing     Cardiac testing:   No results found for this or any previous visit. Imaging: I have personally reviewed pertinent reports. XR chest 1 view portable    Result Date: 10/10/2023    Impression: No acute cardiopulmonary disease. EKG/ Monitor: Personally reviewed.      Telemetry reviewed: Currently atrial fibrillation with rapid ventricular response, ventricular rate 124 bpm.    10/10/23 EKG: Atrial fibrillation with rapid ventricular response, ventricular rate 126 bpm.  RBBB.     Code Status: Level 1 - Full Code  Advance Directive and Living Will:      POLST:        Jeremy East PA-C

## 2023-10-10 NOTE — H&P
[id number] [facility]  H&P  Name: Bahman Muñoz 75 y.o. male I MRN: [id number]  Unit/Bed#: ICU 10 I Date of Admission: 10/10/2023   Date of Service: 10/10/2023 I Hospital Day: 0      Assessment/Plan   * Atrial fibrillation with RVR [facility])  Assessment & Plan  Patient presented to ED with intermittent, sharp, right-sided chest pain associated with fatigue and lightheadedness. EKG: atrial fibrillation with RVR   CXR: No acute cardiopulmonary disease. · Initial troponin negative  · Home medications: amiodarone 200 mg daily, cardizem 120 mg daily, and Lopressor 25 mg bid  · Received Lopressor 5 mg x 2 in ED  · Cardiology consulted  · Started on amiodarone gtt  · Continue home Lopressor  · Plan for cardioversion tomorrow  · Continue Eliquis  · Monitor on telemetry    History of sick sinus syndrome  Assessment & Plan  · Has pacemaker    Hyperlipidemia  Assessment & Plan  · Continue statin    Obesity, morbid ([address])  Assessment & Plan  · Working on getting Wegovy outpatient  · Lost weight recently from diet and exercise    Lymphedema  Assessment & Plan  · On lasix 40 mg daily       VTE Pharmacologic Prophylaxis: VTE Score: 4 Moderate Risk (Score 3-4) - Pharmacological DVT Prophylaxis Ordered: apixaban (Eliquis). Code Status: Level 1 - Full Code  Discussion with family: Patient declined call to . Patient declined, he updated his friend. Anticipated Length of Stay: Patient will be admitted on an inpatient basis with an anticipated length of stay of greater than 2 midnights secondary to afib with RVR. Total Time Spent on Date of Encounter in care of patient: 65 mins.  This time was spent on one or more of the following: performing physical exam; counseling and coordination of care; obtaining or reviewing history; documenting in the medical record; reviewing/ordering tests, medications or procedures; communicating with other healthcare professionals and discussing with patient's family/caregivers. Chief Complaint: chest pain    History of Present Illness:  Misael Storey is a 75 y.o. male with a PMH of atrial fibrillation on eliquis, hypertension, lymphedema, leukemia, obesity, HLD, SSS s/p pacemaker  who presents with intermittent right sided chest pain associated with fatigue and lightheadedness. Reports it feels like a needle poking him in the chest intermittently for a few hours then decided to come to ED. Has been compliant with medications. Denies other symptoms including dizziness, cough, shortness of breath, palpitations, nausea, vomiting, or abdominal pain. Review of Systems:  Review of Systems   Constitutional: Positive for fatigue. Negative for chills and fever. HENT: Negative for ear pain and sore throat. Eyes: Negative for pain and visual disturbance. Respiratory: Negative for cough and shortness of breath. Cardiovascular: Positive for chest pain. Negative for palpitations. Gastrointestinal: Negative for abdominal pain and vomiting. Genitourinary: Negative for dysuria and hematuria. Musculoskeletal: Negative for arthralgias and back pain. Skin: Negative for color change and rash. Neurological: Positive for light-headedness. Negative for seizures and syncope. All other systems reviewed and are negative. Past Medical and Surgical History:   Past Medical History:   Diagnosis Date   • Cardiac disease     atrial fib,pacemaker   • Hyperlipidemia    • Hypertension        Past Surgical History:   Procedure Laterality Date   • FL INJECTION LEFT KNEE (ARTHROGRAM)  12/6/2022       Meds/Allergies:  Prior to Admission medications    Medication Sig Start Date End Date Taking?  Authorizing Provider   alfuzosin (UROXATRAL) 10 mg 24 hr tablet Take 10 mg by mouth daily   Yes Historical Provider, MD   amiodarone 200 mg tablet  10/20/22  Yes Historical Provider, MD   apixaban (ELIQUIS) 5 mg Take 5 mg by mouth 2 (two) times a day   Yes Historical Provider, MD diltiazem (CARDIZEM CD) 120 mg 24 hr capsule  1/11/23  Yes Historical Provider, MD   folic acid (FOLVITE) 1 mg tablet  10/13/22  Yes Historical Provider, MD   furosemide (LASIX) 40 mg tablet  10/20/22  Yes Historical Provider, MD   metoprolol tartrate (LOPRESSOR) 50 mg tablet Take 25 mg by mouth every 12 (twelve) hours   Yes Historical Provider, MD   Multiple Vitamin (MULTIVITAMIN ADULT PO) Take by mouth   Yes Historical Provider, MD   Multiple Vitamins-Minerals (PRESERVISION AREDS PO) Take by mouth   Yes Historical Provider, MD   omeprazole (PriLOSEC) 40 MG capsule Take 40 mg by mouth daily   Yes Historical Provider, MD   prednisoLONE acetate (PRED FORTE) 1 % ophthalmic suspension  5/19/23  Yes Historical Provider, MD   rOPINIRole (REQUIP) 3 mg tablet  9/25/22  Yes Historical Provider, MD   simvastatin (ZOCOR) 40 mg tablet Take 40 mg by mouth daily at bedtime   Yes Historical Provider, MD   finasteride (PROSCAR) 5 mg tablet  10/20/22   Historical Provider, MD   Semaglutide-Weight Management (WEGOVY) 0.25 MG/0.5ML Inject 0.5 mL (0.25 mg total) under the skin once a week 8/17/23   Hernando Bell MD   Trexall 10 MG tablet  12/18/22   Historical Provider, MD     I have reviewed home medications with patient personally. Allergies: Allergies   Allergen Reactions   • Penicillins        Social History:  Marital Status:    Patient Pre-hospital Living Situation: Home  Patient Pre-hospital Level of Mobility: walks with cane  Patient Pre-hospital Diet Restrictions: none  Substance Use History:   Social History     Substance and Sexual Activity   Alcohol Use Yes    Comment: occas     Social History     Tobacco Use   Smoking Status Former   Smokeless Tobacco Never     Social History     Substance and Sexual Activity   Drug Use Yes       Family History:  History reviewed. No pertinent family history.     Physical Exam:     Vitals:   Blood Pressure: 116/70 (10/10/23 1342)  Pulse: (!) 119 (10/10/23 1342)  Temperature: 97.9 °F (36.6 °C) (10/10/23 1241)  Temp Source: Temporal (10/10/23 1241)  Respirations: (!) 29 (10/10/23 1300)  Height: 5' 6" (167.6 cm) (10/10/23 1215)  Weight - Scale: 123 kg (270 lb 4.5 oz) (10/10/23 1215)  SpO2: 98 % (10/10/23 1300)    Physical Exam  Vitals and nursing note reviewed. Constitutional:       General: He is not in acute distress. Appearance: He is well-developed. He is obese. Cardiovascular:      Rate and Rhythm: Tachycardia present. Rhythm irregular. Pulmonary:      Effort: Pulmonary effort is normal. No respiratory distress. Breath sounds: Normal breath sounds. Abdominal:      Palpations: Abdomen is soft. Tenderness: There is no abdominal tenderness. Musculoskeletal:         General: No swelling. Right lower leg: Edema present. Left lower leg: Edema present. Skin:     General: Skin is warm and dry. Capillary Refill: Capillary refill takes less than 2 seconds. Neurological:      Mental Status: He is alert.    Psychiatric:         Mood and Affect: Mood normal.         Additional Data:     Lab Results:  Results from last 7 days   Lab Units 10/10/23  0933   WBC Thousand/uL 3.95*   HEMOGLOBIN g/dL 13.7   HEMATOCRIT % 39.5   PLATELETS Thousands/uL 201   NEUTROS PCT % 74   LYMPHS PCT % 13*   MONOS PCT % 11   EOS PCT % 1     Results from last 7 days   Lab Units 10/10/23  0933   SODIUM mmol/L 141   POTASSIUM mmol/L 3.7   CHLORIDE mmol/L 106   CO2 mmol/L 25   BUN mg/dL 18   CREATININE mg/dL 0.96   ANION GAP mmol/L 10   CALCIUM mg/dL 8.6   ALBUMIN g/dL 4.2   TOTAL BILIRUBIN mg/dL 0.60   ALK PHOS U/L 65   ALT U/L 26   AST U/L 21   GLUCOSE RANDOM mg/dL 119     Results from last 7 days   Lab Units 10/10/23  0933   INR  1.17                   Lines/Drains:  Invasive Devices     Peripheral Intravenous Line  Duration           Peripheral IV 10/10/23 Right;Ventral (anterior) Hand <1 day                    Imaging: Reviewed radiology reports from this admission including: chest xray  XR chest 1 view portable   Final Result by Rylan Chicas MD (10/10 1040)      No acute cardiopulmonary disease. Workstation performed: [id number]             EKG and Other Studies Reviewed on Admission:   · EKG: Atrial fibrillation. . ** Please Note: This note has been constructed using a voice recognition system.  **

## 2023-10-10 NOTE — ED PROVIDER NOTES
History  Chief Complaint   Patient presents with   • Chest Pain     C/O "ping" of sharp R sided CP that come and go . Pt reports symptoms started this morning when he woke up. Pt reports feel;ing lightheaded and tired as well. 58-year-old male presents with right-sided chest pain sharp last for less than a minute intermittent does not radiate to the back no associated shortness of breath diaphoresis nausea pleurisy or any other symptoms. History of atrial fibrillation currently in rapid A-fib. Has been compliant with his medications. Denies alcohol use. History provided by:  Patient   used: No        Prior to Admission Medications   Prescriptions Last Dose Informant Patient Reported? Taking?    Multiple Vitamin (MULTIVITAMIN ADULT PO) 10/10/2023  Yes Yes   Sig: Take by mouth   Multiple Vitamins-Minerals (PRESERVISION AREDS PO) 10/10/2023  Yes Yes   Sig: Take by mouth   Semaglutide-Weight Management (WEGOVY) 0.25 MG/0.5ML   No No   Sig: Inject 0.5 mL (0.25 mg total) under the skin once a week   Trexall 10 MG tablet  Self Yes No   Patient not taking: Reported on 8/15/2023   alfuzosin (UROXATRAL) 10 mg 24 hr tablet 10/10/2023 Self Yes Yes   Sig: Take 10 mg by mouth daily   amiodarone 200 mg tablet 10/10/2023 Self Yes Yes   apixaban (ELIQUIS) 5 mg 10/10/2023 Self Yes Yes   Sig: Take 5 mg by mouth 2 (two) times a day   diltiazem (CARDIZEM CD) 120 mg 24 hr capsule 10/10/2023 Self Yes Yes   finasteride (PROSCAR) 5 mg tablet Not Taking Self Yes No   Patient not taking: Reported on 9/20/1288   folic acid (FOLVITE) 1 mg tablet 10/10/2023 Self Yes Yes   furosemide (LASIX) 40 mg tablet 10/10/2023 Self Yes Yes   metoprolol tartrate (LOPRESSOR) 50 mg tablet 10/10/2023 Self Yes Yes   Sig: Take 25 mg by mouth every 12 (twelve) hours   omeprazole (PriLOSEC) 40 MG capsule 10/10/2023 Self Yes Yes   Sig: Take 40 mg by mouth daily   prednisoLONE acetate (PRED FORTE) 1 % ophthalmic suspension 10/10/2023 Yes Yes   rOPINIRole (REQUIP) 3 mg tablet 10/10/2023 Self Yes Yes   simvastatin (ZOCOR) 40 mg tablet 10/10/2023 Self Yes Yes   Sig: Take 40 mg by mouth daily at bedtime      Facility-Administered Medications: None       Past Medical History:   Diagnosis Date   • Cardiac disease     atrial fib,pacemaker   • Hyperlipidemia    • Hypertension        Past Surgical History:   Procedure Laterality Date   • FL INJECTION LEFT KNEE (ARTHROGRAM)  12/6/2022       History reviewed. No pertinent family history. I have reviewed and agree with the history as documented. E-Cigarette/Vaping   • E-Cigarette Use Never User      E-Cigarette/Vaping Substances   • Nicotine No    • THC No    • CBD No    • Flavoring No    • Other No    • Unknown No      Social History     Tobacco Use   • Smoking status: Former   • Smokeless tobacco: Never   Vaping Use   • Vaping Use: Never used   Substance Use Topics   • Alcohol use: Yes     Comment: occas   • Drug use: Yes       Review of Systems   Constitutional: Negative. HENT: Negative. Eyes: Negative. Respiratory: Negative. Cardiovascular: Positive for chest pain. Gastrointestinal: Negative. Endocrine: Negative. Genitourinary: Negative. Musculoskeletal: Negative. Skin: Negative. Allergic/Immunologic: Negative. Neurological: Negative. Hematological: Negative. Psychiatric/Behavioral: Negative. All other systems reviewed and are negative. Physical Exam  Physical Exam  Vitals and nursing note reviewed. Constitutional:       Appearance: Normal appearance. HENT:      Head: Normocephalic and atraumatic. Nose: Nose normal.      Mouth/Throat:      Mouth: Mucous membranes are moist.   Eyes:      Extraocular Movements: Extraocular movements intact. Pupils: Pupils are equal, round, and reactive to light. Cardiovascular:      Rate and Rhythm: Tachycardia present. Rhythm irregular.    Pulmonary:      Effort: Pulmonary effort is normal.      Breath sounds: Normal breath sounds. Abdominal:      General: Abdomen is flat. Bowel sounds are normal.      Palpations: Abdomen is soft. Musculoskeletal:         General: Normal range of motion. Cervical back: Normal range of motion and neck supple. Skin:     General: Skin is warm. Capillary Refill: Capillary refill takes less than 2 seconds. Neurological:      General: No focal deficit present. Mental Status: He is alert and oriented to person, place, and time. Mental status is at baseline. Psychiatric:         Mood and Affect: Mood normal.         Thought Content:  Thought content normal.         Vital Signs  ED Triage Vitals [10/10/23 0916]   Temperature Pulse Respirations Blood Pressure SpO2   97.5 °F (36.4 °C) (!) 122 (!) 26 143/93 97 %      Temp Source Heart Rate Source Patient Position - Orthostatic VS BP Location FiO2 (%)   Oral Monitor Lying Left arm --      Pain Score       6           Vitals:    10/10/23 1215 10/10/23 1241 10/10/23 1300 10/10/23 1342   BP: 104/73 138/85 125/77 116/70   Pulse: (!) 110 (!) 120 (!) 120 (!) 119   Patient Position - Orthostatic VS:             Visual Acuity      ED Medications  Medications   tamsulosin (FLOMAX) capsule 0.4 mg (0.4 mg Oral Given 10/10/23 1605)   apixaban (ELIQUIS) tablet 5 mg (5 mg Oral Given 10/10/23 1340)   furosemide (LASIX) tablet 40 mg (has no administration in time range)   pantoprazole (PROTONIX) EC tablet 40 mg (has no administration in time range)   rOPINIRole (REQUIP) tablet 2 mg (has no administration in time range)   pravastatin (PRAVACHOL) tablet 80 mg (80 mg Oral Given 10/10/23 1605)   pneumococcal 20-france conj vacc (PREVNAR 20) IM Injection 0.5 mL (has no administration in time range)   metoprolol tartrate (LOPRESSOR) tablet 25 mg (25 mg Oral Given 10/10/23 1342)   amiodarone (CORDARONE) 900 mg in dextrose 5 % 500 mL infusion (1 mg/min Intravenous New Bag 10/10/23 1517)     Followed by   amiodarone (CORDARONE) 900 mg in dextrose 5 % 500 mL infusion (has no administration in time range)   metoprolol (LOPRESSOR) injection 5 mg (5 mg Intravenous Given 10/10/23 0933)   metoprolol (LOPRESSOR) injection 5 mg (3 mg Intravenous Given 10/10/23 1042)   influenza vaccine, high-dose (FLUZONE HIGH-DOSE) IM injection SEGUNDO 0.7 mL (0.7 mL Intramuscular Given 10/10/23 1344)   amiodarone 150 mg in dextrose 5 % 100 mL IV bolus (150 mg Intravenous New Bag 10/10/23 1435)       Diagnostic Studies  Results Reviewed     Procedure Component Value Units Date/Time    HS Troponin I 2hr [[id number]]  (Normal) Collected: 10/10/23 1108    Lab Status: Final result Specimen: Blood from Arm, Right Updated: 10/10/23 1152     hs TnI 2hr 4 ng/L      Delta 2hr hsTnI -1 ng/L     FLU/RSV/COVID - if FLU/RSV clinically relevant [[id number]]  (Normal) Collected: 10/10/23 0933    Lab Status: Final result Specimen: Nares from Nose Updated: 10/10/23 1022     SARS-CoV-2 Negative     INFLUENZA A PCR Negative     INFLUENZA B PCR Negative     RSV PCR Negative    Narrative:      FOR PEDIATRIC PATIENTS - copy/paste COVID Guidelines URL to browser: [email or web address]    SARS-CoV-2 assay is a Nucleic Acid Amplification assay intended for the  qualitative detection of nucleic acid from SARS-CoV-2 in nasopharyngeal  swabs. Results are for the presumptive identification of SARS-CoV-2 RNA. Positive results are indicative of infection with SARS-CoV-2, the virus  causing COVID-19, but do not rule out bacterial infection or co-infection  with other viruses. Laboratories within the [facility] and its  territories are required to report all positive results to the appropriate  public health authorities. Negative results do not preclude SARS-CoV-2  infection and should not be used as the sole basis for treatment or other  patient management decisions.  Negative results must be combined with  clinical observations, patient history, and epidemiological information. This test has not been FDA cleared or approved. This test has been authorized by FDA under an Emergency Use Authorization  (EUA). This test is only authorized for the duration of time the  declaration that circumstances exist justifying the authorization of the  emergency use of an in vitro diagnostic tests for detection of SARS-CoV-2  virus and/or diagnosis of COVID-19 infection under section 564(b)(1) of  the Act, 21 U. S.C. 092GEI-8(V)(8), unless the authorization is terminated  or revoked sooner. The test has been validated but independent review by FDA  and CLIA is pending. Test performed using [employer] GeneXpert: This RT-PCR assay targets N2,  a region unique to SARS-CoV-2. A conserved region in the E-gene was chosen  for pan-Sarbecovirus detection which includes SARS-CoV-2. According to CMS-2020-01-R, this platform meets the definition of high-throughput technology.     Comprehensive metabolic panel [[id number]]  (Abnormal) Collected: 10/10/23 0933    Lab Status: Final result Specimen: Blood from Hand, Right Updated: 10/10/23 1016     Sodium 141 mmol/L      Potassium 3.7 mmol/L      Chloride 106 mmol/L      CO2 25 mmol/L      ANION GAP 10 mmol/L      BUN 18 mg/dL      Creatinine 0.96 mg/dL      Glucose 119 mg/dL      Calcium 8.6 mg/dL      AST 21 U/L      ALT 26 U/L      Alkaline Phosphatase 65 U/L      Total Protein 6.2 g/dL      Albumin 4.2 g/dL      Total Bilirubin 0.60 mg/dL      eGFR 77 ml/min/1.73sq m     Narrative:      [place] guidelines for Chronic Kidney Disease (CKD):   •  Stage 1 with normal or high GFR (GFR > 90 mL/min/1.73 square meters)  •  Stage 2 Mild CKD (GFR = 60-89 mL/min/1.73 square meters)  •  Stage 3A Moderate CKD (GFR = 45-59 mL/min/1.73 square meters)  •  Stage 3B Moderate CKD (GFR = 30-44 mL/min/1.73 square meters)  •  Stage 4 Severe CKD (GFR = 15-29 mL/min/1.73 square meters)  •  Stage 5 End Stage CKD (GFR <15 mL/min/1.73 square meters)  Note: GFR calculation is accurate only with a steady state creatinine    Magnesium [[id number]]  (Normal) Collected: 10/10/23 0933    Lab Status: Final result Specimen: Blood from Hand, Right Updated: 10/10/23 1016     Magnesium 1.9 mg/dL     TSH [[id number]]  (Normal) Collected: 10/10/23 0933    Lab Status: Final result Specimen: Blood from Hand, Right Updated: 10/10/23 1016     TSH 3RD GENERATON 0.594 uIU/mL     HS Troponin 0hr (reflex protocol) [[id number]]  (Normal) Collected: 10/10/23 0933    Lab Status: Final result Specimen: Blood from Hand, Right Updated: 10/10/23 1006     hs TnI 0hr 5 ng/L     Protime-INR [[id number]]  (Abnormal) Collected: 10/10/23 0933    Lab Status: Final result Specimen: Blood from Hand, Right Updated: 10/10/23 0957     Protime 15.1 seconds      INR 1.17    APTT [[id number]]  (Normal) Collected: 10/10/23 0933    Lab Status: Final result Specimen: Blood from Hand, Right Updated: 10/10/23 0957     PTT 30 seconds     CBC and differential [[id number]]  (Abnormal) Collected: 10/10/23 0933    Lab Status: Final result Specimen: Blood from Hand, Right Updated: 10/10/23 0941     WBC 3.95 Thousand/uL      RBC 3.88 Million/uL      Hemoglobin 13.7 g/dL      Hematocrit 39.5 %       fL      MCH 35.3 pg      MCHC 34.7 g/dL      RDW 15.7 %      MPV 8.9 fL      Platelets 939 Thousands/uL      nRBC 0 /100 WBCs      Neutrophils Relative 74 %      Immat GRANS % 1 %      Lymphocytes Relative 13 %      Monocytes Relative 11 %      Eosinophils Relative 1 %      Basophils Relative 0 %      Neutrophils Absolute 2.93 Thousands/µL      Immature Grans Absolute 0.04 Thousand/uL      Lymphocytes Absolute 0.51 Thousands/µL      Monocytes Absolute 0.44 Thousand/µL      Eosinophils Absolute 0.02 Thousand/µL      Basophils Absolute 0.01 Thousands/µL                  XR chest 1 view portable   Final Result by Kathia Maldonado MD (10/10 1040)      No acute cardiopulmonary disease. Workstation performed: [id number]                    Procedures  ECG 12 Lead Documentation Only    Performed by: Emerson Falcon DO  Authorized by: Emerson Falcon DO    ECG reviewed by me, the ED Provider: yes    Patient location:  ED  Previous ECG:     Previous ECG:  Unavailable    Comparison to cardiac monitor: Yes    Interpretation:     Interpretation: abnormal    Rate:     ECG rate assessment: tachycardic    Rhythm:     Rhythm: atrial fibrillation    Ectopy:     Ectopy: none    QRS:     QRS axis:  Normal  Conduction:     Conduction: normal    ST segments:     ST segments:  Non-specific  T waves:     T waves: non-specific    CriticalCare Time    Date/Time: 10/10/2023 3:35 PM    Performed by: Emerson Falcon DO  Authorized by: Emerson Falcon DO    Critical care provider statement:     Critical care time (minutes):  45    Critical care was necessary to treat or prevent imminent or life-threatening deterioration of the following conditions:  Cardiac failure    Critical care was time spent personally by me on the following activities:  Blood draw for specimens, obtaining history from patient or surrogate, development of treatment plan with patient or surrogate, discussions with consultants, evaluation of patient's response to treatment, examination of patient, interpretation of cardiac output measurements, ordering and performing treatments and interventions, ordering and review of laboratory studies, ordering and review of radiographic studies, re-evaluation of patient's condition and review of old charts    I assumed direction of critical care for this patient from another provider in my specialty: no               ED Course                               SBIRT 20yo+    Flowsheet Row Most Recent Value   Initial Alcohol Screen: US AUDIT-C     1. How often do you have a drink containing alcohol? 1 Filed at: 10/10/2023 0921   2. How many drinks containing alcohol do you have on a typical day you are drinking?   0 Filed at: 10/10/2023 0921   3a. Male UNDER 65: How often do you have five or more drinks on one occasion? 0 Filed at: 10/10/2023 0921   3b. FEMALE Any Age, or MALE 65+: How often do you have 4 or more drinks on one occassion? 0 Filed at: 10/10/2023 2195   Audit-C Score 1 Filed at: 10/10/2023 1347   DIANE: How many times in the past year have you. .. Used an illegal drug or used a prescription medication for non-medical reasons? Never Filed at: 10/10/2023 0253                    Medical Decision Making  Patient evaluated with labs, imaging,EKG. Reviewed results discussed with patient. Patient given IV diltiazem. Patient admitted to hospitalist service for further evaluation and management. Patient verbalized understanding of results and agreed with the plan. Atrial fibrillation, unspecified type [facility]): acute illness or injury  Amount and/or Complexity of Data Reviewed  External Data Reviewed: notes. Labs: ordered. Decision-making details documented in ED Course. Radiology: ordered. Decision-making details documented in ED Course. ECG/medicine tests: ordered and independent interpretation performed. Decision-making details documented in ED Course. Risk  Prescription drug management. Decision regarding hospitalization.           Disposition  Final diagnoses:   Atrial fibrillation, unspecified type [facility])     Time reflects when diagnosis was documented in both MDM as applicable and the Disposition within this note     Time User Action Codes Description Comment    10/10/2023 10:55 AM Sydnee Landaverde [I48.91] Atrial fibrillation, unspecified type ([address])     10/10/2023 12:12 PM David Boland [I48.91] Atrial fibrillation with RVR [facility])       ED Disposition     ED Disposition   Admit    Condition   Stable    Date/Time   Tue Oct 10, 2023 11:02 AM    Comment               Follow-up Information    None         Current Discharge Medication List      CONTINUE these medications which have NOT CHANGED    Details   alfuzosin (UROXATRAL) 10 mg 24 hr tablet Take 10 mg by mouth daily      amiodarone 200 mg tablet       apixaban (ELIQUIS) 5 mg Take 5 mg by mouth 2 (two) times a day      diltiazem (CARDIZEM CD) 120 mg 24 hr capsule       folic acid (FOLVITE) 1 mg tablet       furosemide (LASIX) 40 mg tablet       metoprolol tartrate (LOPRESSOR) 50 mg tablet Take 25 mg by mouth every 12 (twelve) hours      Multiple Vitamin (MULTIVITAMIN ADULT PO) Take by mouth      Multiple Vitamins-Minerals (PRESERVISION AREDS PO) Take by mouth      omeprazole (PriLOSEC) 40 MG capsule Take 40 mg by mouth daily      prednisoLONE acetate (PRED FORTE) 1 % ophthalmic suspension       rOPINIRole (REQUIP) 3 mg tablet       simvastatin (ZOCOR) 40 mg tablet Take 40 mg by mouth daily at bedtime      finasteride (PROSCAR) 5 mg tablet       Semaglutide-Weight Management (WEGOVY) 0.25 MG/0.5ML Inject 0.5 mL (0.25 mg total) under the skin once a week  Qty: 2 mL, Refills: 0    Associated Diagnoses: BMI 45.0-49.9, adult ([address]); Abnormal glucose      Trexall 10 MG tablet              No discharge procedures on file.     PDMP Review     None          ED Provider  Electronically Signed by           Angelo Adair DO  10/10/23 7936       Sydnee Landaverde DO  10/10/23 1619

## 2023-10-11 ENCOUNTER — APPOINTMENT (OUTPATIENT)
Dept: NON INVASIVE DIAGNOSTICS | Facility: HOSPITAL | Age: 73
End: 2023-10-11
Payer: MEDICARE

## 2023-10-11 ENCOUNTER — APPOINTMENT (INPATIENT)
Dept: NON INVASIVE DIAGNOSTICS | Facility: HOSPITAL | Age: 73
End: 2023-10-11
Payer: MEDICARE

## 2023-10-11 VITALS
SYSTOLIC BLOOD PRESSURE: 138 MMHG | WEIGHT: 270 LBS | HEIGHT: 66 IN | DIASTOLIC BLOOD PRESSURE: 66 MMHG | BODY MASS INDEX: 43.39 KG/M2 | HEART RATE: 77 BPM | OXYGEN SATURATION: 97 % | TEMPERATURE: 97.8 F | RESPIRATION RATE: 20 BRPM

## 2023-10-11 LAB
ANION GAP SERPL CALCULATED.3IONS-SCNC: 6 MMOL/L
AORTIC ROOT: 3.9 CM
APICAL FOUR CHAMBER EJECTION FRACTION: 46 %
AV LVOT PEAK GRADIENT: 3 MMHG
AV PEAK GRADIENT: 12 MMHG
BUN SERPL-MCNC: 17 MG/DL (ref 5–25)
CALCIUM SERPL-MCNC: 8.3 MG/DL (ref 8.4–10.2)
CHLORIDE SERPL-SCNC: 106 MMOL/L (ref 96–108)
CHOLEST SERPL-MCNC: 130 MG/DL
CO2 SERPL-SCNC: 27 MMOL/L (ref 21–32)
CREAT SERPL-MCNC: 0.98 MG/DL (ref 0.6–1.3)
DOP CALC LVOT AREA: 3.8 CM2
DOP CALC LVOT DIAMETER: 2.2 CM
E WAVE DECELERATION TIME: 177 MS
E/A RATIO: 120
ERYTHROCYTE [DISTWIDTH] IN BLOOD BY AUTOMATED COUNT: 15.9 % (ref 11.6–15.1)
FRACTIONAL SHORTENING: 27 (ref 28–44)
GFR SERPL CREATININE-BSD FRML MDRD: 75 ML/MIN/1.73SQ M
GLUCOSE SERPL-MCNC: 105 MG/DL (ref 65–140)
HCT VFR BLD AUTO: 37 % (ref 36.5–49.3)
HDLC SERPL-MCNC: 31 MG/DL
HGB BLD-MCNC: 12.5 G/DL (ref 12–17)
INTERVENTRICULAR SEPTUM IN DIASTOLE (PARASTERNAL SHORT AXIS VIEW): 1.3 CM
INTERVENTRICULAR SEPTUM: 1.3 CM (ref 0.6–1.1)
LAAS-AP2: 29.3 CM2
LAAS-AP4: 36.5 CM2
LDLC SERPL CALC-MCNC: 73 MG/DL (ref 0–100)
LEFT ATRIUM SIZE: 5.4 CM
LEFT ATRIUM VOLUME (MOD BIPLANE): 129 ML
LEFT ATRIUM VOLUME INDEX (MOD BIPLANE): 56.8 ML/M2
LEFT INTERNAL DIMENSION IN SYSTOLE: 3.7 CM (ref 2.1–4)
LEFT VENTRICULAR INTERNAL DIMENSION IN DIASTOLE: 5.1 CM (ref 3.5–6)
LEFT VENTRICULAR POSTERIOR WALL IN END DIASTOLE: 1.3 CM
LEFT VENTRICULAR STROKE VOLUME: 66 ML
LVSV (TEICH): 66 ML
MAGNESIUM SERPL-MCNC: 1.9 MG/DL (ref 1.9–2.7)
MCH RBC QN AUTO: 34.5 PG (ref 26.8–34.3)
MCHC RBC AUTO-ENTMCNC: 33.8 G/DL (ref 31.4–37.4)
MCV RBC AUTO: 102 FL (ref 82–98)
MRSA NOSE QL CULT: NORMAL
MV E'TISSUE VEL-SEP: 4 CM/S
MV PEAK A VEL: 0.01 M/S
MV PEAK E VEL: 120 CM/S
MV STENOSIS PRESSURE HALF TIME: 51 MS
MV VALVE AREA P 1/2 METHOD: 4.31
NONHDLC SERPL-MCNC: 99 MG/DL
PLATELET # BLD AUTO: 158 THOUSANDS/UL (ref 149–390)
PMV BLD AUTO: 8.6 FL (ref 8.9–12.7)
POTASSIUM SERPL-SCNC: 3.3 MMOL/L (ref 3.5–5.3)
RBC # BLD AUTO: 3.62 MILLION/UL (ref 3.88–5.62)
RIGHT ATRIUM AREA SYSTOLE A4C: 25 CM2
RIGHT VENTRICLE ID DIMENSION: 3.7 CM
SL CV LEFT ATRIUM LENGTH A2C: 6.5 CM
SL CV LV EF: 45
SL CV PED ECHO LEFT VENTRICLE DIASTOLIC VOLUME (MOD BIPLANE) 2D: 124 ML
SL CV PED ECHO LEFT VENTRICLE SYSTOLIC VOLUME (MOD BIPLANE) 2D: 58 ML
SODIUM SERPL-SCNC: 139 MMOL/L (ref 135–147)
TR MAX PG: 30 MMHG
TR PEAK VELOCITY: 2.7 M/S
TRICUSPID ANNULAR PLANE SYSTOLIC EXCURSION: 1.6 CM
TRICUSPID VALVE PEAK REGURGITATION VELOCITY: 2.73 M/S
TRIGL SERPL-MCNC: 128 MG/DL
WBC # BLD AUTO: 4.86 THOUSAND/UL (ref 4.31–10.16)

## 2023-10-11 PROCEDURE — 85027 COMPLETE CBC AUTOMATED: CPT

## 2023-10-11 PROCEDURE — 93005 ELECTROCARDIOGRAM TRACING: CPT

## 2023-10-11 PROCEDURE — 93306 TTE W/DOPPLER COMPLETE: CPT

## 2023-10-11 PROCEDURE — 99238 HOSP IP/OBS DSCHRG MGMT 30/<: CPT | Performed by: NURSE PRACTITIONER

## 2023-10-11 PROCEDURE — 99233 SBSQ HOSP IP/OBS HIGH 50: CPT | Performed by: INTERNAL MEDICINE

## 2023-10-11 PROCEDURE — 80061 LIPID PANEL: CPT | Performed by: PHYSICIAN ASSISTANT

## 2023-10-11 PROCEDURE — 93306 TTE W/DOPPLER COMPLETE: CPT | Performed by: INTERNAL MEDICINE

## 2023-10-11 PROCEDURE — 83735 ASSAY OF MAGNESIUM: CPT

## 2023-10-11 PROCEDURE — NC001 PR NO CHARGE: Performed by: NURSE PRACTITIONER

## 2023-10-11 PROCEDURE — 94762 N-INVAS EAR/PLS OXIMTRY CONT: CPT

## 2023-10-11 PROCEDURE — 80048 BASIC METABOLIC PNL TOTAL CA: CPT

## 2023-10-11 RX ORDER — DILTIAZEM HYDROCHLORIDE 120 MG/1
120 CAPSULE, COATED, EXTENDED RELEASE ORAL DAILY
Status: DISCONTINUED | OUTPATIENT
Start: 2023-10-12 | End: 2023-10-11 | Stop reason: HOSPADM

## 2023-10-11 RX ORDER — AMIODARONE HYDROCHLORIDE 200 MG/1
200 TABLET ORAL DAILY
Status: DISCONTINUED | OUTPATIENT
Start: 2023-10-12 | End: 2023-10-11 | Stop reason: HOSPADM

## 2023-10-11 RX ORDER — POTASSIUM CHLORIDE 20 MEQ/1
40 TABLET, EXTENDED RELEASE ORAL ONCE
Status: COMPLETED | OUTPATIENT
Start: 2023-10-11 | End: 2023-10-11

## 2023-10-11 RX ADMIN — POTASSIUM CHLORIDE 40 MEQ: 1500 TABLET, EXTENDED RELEASE ORAL at 10:40

## 2023-10-11 RX ADMIN — FUROSEMIDE 40 MG: 40 TABLET ORAL at 10:40

## 2023-10-11 RX ADMIN — PRAVASTATIN SODIUM 80 MG: 80 TABLET ORAL at 16:25

## 2023-10-11 RX ADMIN — TAMSULOSIN HYDROCHLORIDE 0.4 MG: 0.4 CAPSULE ORAL at 16:25

## 2023-10-11 RX ADMIN — APIXABAN 5 MG: 5 TABLET, FILM COATED ORAL at 18:03

## 2023-10-11 RX ADMIN — APIXABAN 5 MG: 5 TABLET, FILM COATED ORAL at 10:42

## 2023-10-11 RX ADMIN — AMIODARONE HYDROCHLORIDE 0.5 MG/MIN: 50 INJECTION, SOLUTION INTRAVENOUS at 11:38

## 2023-10-11 NOTE — PROGRESS NOTES
[address] [facility]  Progress Note  Name: Pool Orta  MRN: [id number]  Unit/Bed#: 9[address] [facility] 592-90 I Date of Admission: 10/10/2023   Date of Service: 10/11/2023 I Hospital Day: 1    Assessment/Plan   * Atrial fibrillation with RVR [facility])  Assessment & Plan  Patient presented to ED with intermittent, sharp, right-sided chest pain associated with fatigue and lightheadedness. EKG: atrial fibrillation with RVR   CXR: No acute cardiopulmonary disease. Initial troponin negative  Home medications: amiodarone 200 mg daily, cardizem 120 mg daily, and Lopressor 25 mg bid  Received Lopressor 5 mg x 2 in ED  Cardiology consult appreciated  Amiodarone drip continued  Continue home [place] for cardioversion today  Continue Eliquis    History of sick sinus syndrome  Assessment & Plan  Has pacemaker    Hyperlipidemia  Assessment & Plan  Continue statin    Obesity, morbid ([address])  Assessment & Plan  Working on [facility] outpatient  Lost weight recently from diet and exercise    Lymphedema  Assessment & Plan  On lasix 40 mg daily             VTE Pharmacologic Prophylaxis:   Pharmacologic: Apixaban (Eliquis)  Mechanical VTE Prophylaxis in Place: Yes    Patient Centered Rounds: I have performed bedside rounds with nursing staff today. Discussions with Specialists or Other Care Team Provider: Yes  Education and Discussions with Family / Patient:Yes  Time Spent for Care: 20 minutes. More than 50% of total time spent on counseling and coordination of care as described above. Current Length of Stay: 1 day(s)  Current Patient Status: Inpatient     Discharge Plan: home in 24-48 hours     Code Status: Level 1 - Full Code      Subjective:   Patient sitting up in bed. He expresses frustration about being NPO and how long it is taking to have the cardioversion. He has no pain or discomfort and is feeling well.         Objective:     Vitals:   Temp (24hrs), Av.8 °F (36.6 °C), Min:97.3 °F (36.3 °C), Max:98.6 °F (37 °C)    Temp:  [97.3 °F (36.3 °C)-98.6 °F (37 °C)] 98.6 °F (37 °C)  HR:  [] 126  Resp:  [16-29] 16  BP: ()/(65-85) 118/77  SpO2:  [95 %-98 %] 96 %  Body mass index is 43.62 kg/m². Input and Output Summary (last 24 hours): Intake/Output Summary (Last 24 hours) at 10/11/2023 0944  Last data filed at 10/10/2023 2350  Gross per 24 hour   Intake 744.26 ml   Output 1400 ml   Net -655.74 ml        Physical Exam:     Physical Exam  Vitals and nursing note reviewed. Constitutional:       Appearance: Normal appearance. HENT:      Head: Normocephalic. Nose: Nose normal.   Eyes:      Extraocular Movements: Extraocular movements intact. Cardiovascular:      Rate and Rhythm: Tachycardia present. Rhythm irregular. Pulmonary:      Effort: Pulmonary effort is normal.      Breath sounds: Normal breath sounds. Abdominal:      General: Abdomen is flat. Palpations: Abdomen is soft. Musculoskeletal:         General: No swelling. Normal range of motion. Skin:     General: Skin is warm and dry. Neurological:      General: No focal deficit present. Mental Status: He is alert and oriented to person, place, and time. Additional Data:     Labs:    Results from last 7 days   Lab Units 10/11/23  0430 10/10/23  0933   WBC Thousand/uL 4.86 3.95*   HEMOGLOBIN g/dL 12.5 13.7   HEMATOCRIT % 37.0 39.5   PLATELETS Thousands/uL 158 201   NEUTROS PCT %  --  74     Results from last 7 days   Lab Units 10/11/23  0430 10/10/23  0933   SODIUM mmol/L 139 141   POTASSIUM mmol/L 3.3* 3.7   CHLORIDE mmol/L 106 106   CO2 mmol/L 27 25   BUN mg/dL 17 18   CREATININE mg/dL 0.98 0.96   CALCIUM mg/dL 8.3* 8.6   TOTAL BILIRUBIN mg/dL  --  0.60   ALK PHOS U/L  --  65   ALT U/L  --  26   AST U/L  --  21     Results from last 7 days   Lab Units 10/10/23  0933   INR  1.17         No results found for: "HGBA1C"            * I Have Reviewed All Lab Data Listed Above.   * Additional Pertinent Lab Tests Reviewed: [address] [facility] Admission Reviewed    Imaging:     XR chest 1 view portable   Final Result by Sakina Palacios MD (10/10 1040)      No acute cardiopulmonary disease. Workstation performed: [id number]           Imaging Reports Reviewed by myself    Cultures:   Blood Culture: No results found for: "Mya Brenner"  Urine Culture: No results found for: "URINECX"  Sputum Culture: No components found for: "SPUTUMCX"  Wound Culture: No results found for: "WOUNDCULT"    Last 24 Hours Medication List:   Current Facility-Administered Medications   Medication Dose Route Frequency Provider Last Rate    amiodarone (CORDARONE) 900 mg in dextrose 5 % 500 mL infusion  0.5 mg/min Intravenous Continuous Chad Walker PA-C 0.5 mg/min (10/10/23 2054)    apixaban  5 mg Oral BID Ju Najera PA-C      furosemide  40 mg Oral Daily Ju Najera PA-C      [facility] Hold] metoprolol tartrate  25 mg Oral Q12H [address] Chad Walker PA-C      pantoprazole  40 mg Oral Early Morning Ju Najera PA-C      [facility] Hold] pneumococcal 20-france conj vacc  0.5 mL Intramuscular Once Senthil Archer DO      potassium chloride  40 mEq Oral Once Catha Frankel, CRNP      pravastatin  80 mg Oral Daily With Dinner Ju Najera PA-C      rOPINIRole  2 mg Oral HS Ju Najera PA-C      tamsulosin  0.4 mg Oral Daily With Dinner Ju Najera PA-C          Today, Patient Was Seen By: Catha Frankel, CRNP    ** Please Note: Dragon 360 Dictation voice to text software may have been used in the creation of this document.  **

## 2023-10-11 NOTE — ASSESSMENT & PLAN NOTE
Patient presented to ED with intermittent, sharp, right-sided chest pain associated with fatigue and lightheadedness. EKG: atrial fibrillation with RVR   CXR: No acute cardiopulmonary disease.   Initial troponin negative  Home medications: amiodarone 200 mg daily, cardizem 120 mg daily, and Lopressor 25 mg bid  Received Lopressor 5 mg x 2 in ED  Cardiology consult appreciated  Amiodarone drip continued  Continue home [place] for cardioversion today  Continue Eliquis

## 2023-10-11 NOTE — QUICK NOTE
Arrival to Cath Lab for cardioversion patient spontaneously converted from atrial fibrillation to sinus rhythm. Twelve-lead EKG was obtained and confirmed sinus rhythm, 77 bpm; RBBB. Discussed with patient recommendation to discontinue amiodarone drip and convert to oral amiodarone 200 mg daily (home medication). Restarted home medication of Cardizem  mg daily and will continue Lopressor 25 mg twice daily. Recommend patient follow-up patient cardiologist Dr. Rajendra Mcclendon within 5 to 7 days of discharge. No further cardiology work-up at this time. Patient reported understanding of cardiology plan and was agreeable to recommendations. Notified primary team no further cardiology work-up at this time.

## 2023-10-11 NOTE — DISCHARGE SUMMARY
[id number] [facility]  Discharge- Demetria Tyler 1950, 75 y.o. male MRN: [id number]  Unit/Bed#: [address] [place] [address] Encounter: [id number]  Primary Care Provider: Twin Rayo MD   Date and time admitted to hospital: 10/10/2023  9:12 AM    * Atrial fibrillation with RVR [place] [facility])  Assessment & Plan  Patient presented to ED with intermittent, sharp, right-sided chest pain associated with fatigue and lightheadedness. EKG: atrial fibrillation with RVR   CXR: No acute cardiopulmonary disease. Initial troponin negative  Home medications: amiodarone 200 mg daily, cardizem 120 mg daily, and Lopressor 25 mg bid  Received Lopressor 5 mg x 2 in ED  Cardiology consult appreciated  Amiodarone drip continued  Continue home [place] for cardioversion today, patient converted spontaneously prior to cardioversion  Continue Eliquis  Cardiology recommends discharge on home amiodarone, cardizem, lopressor    History of sick sinus syndrome  Assessment & Plan  Has pacemaker    Hyperlipidemia  Assessment & Plan  Continue statin    Obesity, morbid ([address])  Assessment & Plan  Working on getting [facility] outpatient  Lost weight recently from diet and exercise    Lymphedema  Assessment & Plan  On lasix 40 mg daily      Discharging Physician / Practitioner: Taryn Owen, [address] [place] [address]  PCP: Twin Rayo MD  Admission Date: 10/10/2023  Discharge Date: 10/11/23    Reason for Admission: Chest Pain (C/O "ping" of sharp R sided CP that come and go . Pt reports symptoms started this morning when he woke up.  Pt reports feel;ing lightheaded and tired as well. )        Medical Problems       Resolved Problems  Date Reviewed: 10/11/2023   None         Consultations During Hospital Stay:  Eloisa Bourgeois TO CARDIOLOGY    Procedures Performed:     None    Significant Findings / Test Results:       Results from last 7 days   Lab Units 10/11/23  0430 10/10/23  0933   WBC Thousand/uL 4.86 3.95*   HEMOGLOBIN g/dL 12.5 13.7   PLATELETS Thousands/uL 158 201     Results from last 7 days   Lab Units 10/11/23  0430 10/10/23  0933   SODIUM mmol/L 139 141   POTASSIUM mmol/L 3.3* 3.7   CHLORIDE mmol/L 106 106   CO2 mmol/L 27 25   BUN mg/dL 17 18   CREATININE mg/dL 0.98 0.96   CALCIUM mg/dL 8.3* 8.6   TOTAL BILIRUBIN mg/dL  --  0.60   ALK PHOS U/L  --  65   ALT U/L  --  26   AST U/L  --  21     Results from last 7 days   Lab Units 10/10/23  0933   INR  1.17         No results found for: "HGBA1C"          Blood Culture: No results found for: "BLOODCX"  Urine Culture: No results found for: "URINECX"  Sputum Culture: No components found for: "SPUTUMCX"  Wound Culture: No results found for: "WOUNDCULT"     Imaging  XR chest 1 view portable   Final Result by Betty Baez MD (10/10 1040)      No acute cardiopulmonary disease. Workstation performed: [id number]               Incidental Findings:   none     Test Results Pending at Discharge (will require follow up):   none     Outpatient Tests Requested:  none    Complications:  none    Reason for Admission: atrial fibrillation with rapid ventricular rate     Hospital Course:     PER HPI: Enid Gu is a 75 y.o. male patient with a PMH of hypertension, hyperlipidemia, atrial fibrillation who originally presented to the hospital on 10/10/2023 due to rapid atrial fibrillation. Patient was in his usual state of health when he developed intermittent right-sided chest discomfort for several hours prior to arrival to the ER and in the ER he was found to be in atrial fibrillation with rapid ventricular rate. He was admitted and placed on a amiodarone drip. Plan was for cardioversion but on the day cardioversion was scheduled patient spontaneously converted on his own. He will be discharged home and follow-up with his primary cardiologist.  He is on oral anticoagulation and has missed no doses.     Hospital course:   Please see above list of diagnoses and related plan for additional information. Condition at Discharge: good       Discharge instructions/Information to patient and family:   See after visit summary for information provided to patient and family. Provisions for Follow-Up Care:  See after visit summary for information related to follow-up care and any pertinent home health orders. Disposition:     Home    Planned Readmission: none      Discharge Statement:  I spent 20 minutes discharging the patient. This time was spent on the day of discharge. I had direct contact with the patient on the day of discharge. Greater than 50% of the total time was spent examining patient, answering all patient questions, arranging and discussing plan of care with patient as well as directly providing post-discharge instructions. Additional time then spent on discharge activities. Discharge Medications:  See after visit summary for reconciled discharge medications provided to patient and family.       ** Please Note: This note has been constructed using a voice recognition system **

## 2023-10-11 NOTE — SEDATION DOCUMENTATION
Pt arrived to procedure room in no apparent distress. Pt in afib rate 127 bpm. Pt attached to cardiac monitors. Anesthesia arrived for consent, pt HR noted to be 77 in SR. Cardiac CARLOS EDUARDO Gardner at bedside, 12 lead EKG obtained. Procedure canceled per MD Paige Mojica.

## 2023-10-11 NOTE — PROGRESS NOTES
Progress Note - Cardiology   [facility]     Maynor Brody 75 y.o. male MRN: [id number]  : 1950  Unit/Bed#: [address] [place] [address] Encounter: [id number]    Assessment and Plan:     Expand All Collapse All    Consultation - Cardiology   [facility]      Maynor Brody 75 y.o. male MRN: [id number]  : 1950  Unit/Bed#: ICU 10 Encounter: [id number]        Assessment & Plan   1. Atrial fibrillation with RVR.     - Patient has a known history of paroxysmal atrial fibrillation. He reports he has been cardioverted in the past.  - 10/10/23 EKG: Atrial fibrillation with rapid ventricular response, ventricular rate 126 bpm.  RBBB. - Continue Lopressor 25 mg twice daily. - Continue Lasix 40 mg oral daily.  - Hold home medication of Cardizem at this time given amiodarone drip. - Continue IV amiodarone infusion, will likely discontinue after cardioversion.  - [id number] stroke risk score: 1 point, low-moderate risk. - Continue Eliquis 5 mg twice daily. Patient states he has not had any interruptions in his anticoagulation. - 10/10-10/11 overnight pulse oximetry: AHI 5, suspected pathological breathing disorder.   - Recommend formal outpatient sleep study. - 10/10/23 TSH: 0.594.  - Follow up 10/11/23 TTE.   - Plan for cardioversion today,10/11/2023.      2. Sick sinus syndrome s/p PPM implantation.     - s/p dual-chamber PPM implantation (10/2016). - Patient reports frequent device interrogations with primary cardiologist, Dr. Geovanna Mendieta. 3. Valvular heart disease. - 22 TTE: Mild aortic valve regurgitation, mild aortic valve stenosis. Mild to moderate mitral valve regurgitation. Moderate tricuspid valve regurgitation. Trace phonic valve regurgitation. (scanned in media section). - Follow up 10/11/23 TTE. 4. Pulmonary hypertension.     - 22 TTE: moderate pulmonary hypertension.  - Follow up 10/11/23 TTE.       5. Hypertension.     - BP acceptable. - Continue Lopressor 25 mg twice daily. - Continue Lasix 40 mg oral daily.  - Hold home medication of Cardizem at this time given amiodarone drip. 6. Hyperlipidemia. - 10/11/23 lipid panel: Cholesterol 130, triglycerides 128, HDL 31, LDL 73.  - Continue pravastatin 80 mg daily. 7. Left ventricular hypertrophy. - 2/02/22 TTE: Left ventricular hypertrophy. (scanned in media section). - Follow up 10/11/23 TTE. 8. Leukemia.     - In remission.  - Follows outpatient with [facility] blood and cancer specialist.     9. Chronic lymphedema.     - Patient with documented history of chronic lymphedema.  - Follows outpatient with [facility] vascular surgery. 10.  Class III obesity.    - BMI 43.62 kg/m2.   - Recommend Nutrition consult. 11. Obstructive sleep apnea. - 10/10-10/11 overnight pulse oximetry: AHI 5, suspected pathological breathing disorder.   - Recommend formal outpatient sleep study. Subjective / Objective:         No acute events. Patient remains in atrial fibrillation with rapid ventricular response. Scheduled for cardioversion today, patient is agreeable to proceed with planned cardioversion. Patient currently denies chest pain, palpitations, shortness of breath, lightheadedness, dizziness, headache, nausea, or vomiting. Vitals: Blood pressure 118/77, pulse (!) 126, temperature 98.6 °F (37 °C), temperature source Oral, resp. rate 16, height 5' 6" (1.676 m), weight 123 kg (270 lb 4.5 oz), SpO2 96 %. Vitals:    10/10/23 0916 10/10/23 1215   Weight: 123 kg (271 lb) 123 kg (270 lb 4.5 oz)     Body mass index is 43.62 kg/m².   BP Readings from Last 3 Encounters:   10/11/23 118/77   08/16/23 138/74   08/15/23 130/70     Orthostatic Blood Pressures      Flowsheet Row Most Recent Value   Blood Pressure 118/77 filed at 10/11/2023 0755   Patient Position - Orthostatic VS Lying filed at 10/11/2023 0252          I/O         10/09 0701  10/10 0700 10/10 0701  10/11 0700 10/11 0701  10/12 0700    P. O.  450     I.V. (mL/kg)  194.3 (1.6)     IV Piggyback  100     Total Intake(mL/kg)  744.3 (6.1)     Urine (mL/kg/hr)  1400     Total Output  1400     Net  -655.7                  Invasive Devices       Peripheral Intravenous Line  Duration             Peripheral IV 10/11/23 Dorsal (posterior); Left Hand <1 day                      Intake/Output Summary (Last 24 hours) at 10/11/2023 0800  Last data filed at 10/10/2023 2350  Gross per 24 hour   Intake 744.26 ml   Output 1400 ml   Net -655.74 ml         Physical Exam:   Physical Exam  Vitals reviewed. Constitutional:       General: He is not in acute distress. Appearance: He is obese. Cardiovascular:      Rate and Rhythm: Tachycardia present. Rhythm irregular. Pulses: Normal pulses. Heart sounds: Murmur heard. Pulmonary:      Effort: Pulmonary effort is normal. No respiratory distress. Breath sounds: Decreased breath sounds present. Abdominal:      General: Abdomen is flat. There is no distension. Palpations: Abdomen is soft. Tenderness: There is no abdominal tenderness. Musculoskeletal:      Right lower leg: Edema (Chronic.) present. Left lower leg: Edema (Chronic.) present. Skin:     General: Skin is warm and dry. Neurological:      Mental Status: He is alert and oriented to person, place, and time.        Medications/ Allergies:     Current Facility-Administered Medications   Medication Dose Route Frequency Provider Last Rate    amiodarone (CORDARONE) 900 mg in dextrose 5 % 500 mL infusion  0.5 mg/min Intravenous Continuous Cecily Goodwin PA-C 0.5 mg/min (10/10/23 2054)    apixaban  5 mg Oral BID Briseyda Vera PA-C      furosemide  40 mg Oral Daily Briseyda Vera PA-C      [facility] Hold] metoprolol tartrate  25 mg Oral Q12H [facility] Cecily Goodwin PA-C      pantoprazole  40 mg Oral Early Morning Briseyda Vera PA-C      [facility] Hold] pneumococcal 20-france conj vacc  0.5 mL Intramuscular Once [employer] DO Suzi      pravastatin  80 mg Oral Daily With Dinner Jair Patel PA-C      rOPINIRole  2 mg Oral HS Jair Patel PA-C      tamsulosin  0.4 mg Oral Daily With Dinner Jair Patel PA-C          Allergies   Allergen Reactions    Penicillins        VTE Pharmacologic Prophylaxis:   Eliquis    Labs:   Troponins:  Results from last 7 days   Lab Units 10/10/23  1108   HSTNI D2 ng/L -1         CBC with diff:  Results from last 7 days   Lab Units 10/11/23  0430 10/10/23  0933   WBC Thousand/uL 4.86 3.95*   HEMOGLOBIN g/dL 12.5 13.7   HEMATOCRIT % 37.0 39.5   MCV fL 102* 102*   PLATELETS Thousands/uL 158 201   RBC Million/uL 3.62* 3.88   MCH pg 34.5* 35.3*   MCHC g/dL 33.8 34.7   RDW % 15.9* 15.7*   MPV fL 8.6* 8.9   NRBC AUTO /100 WBCs  --  0       CMP:  Results from last 7 days   Lab Units 10/11/23  0430 10/10/23  0933   SODIUM mmol/L 139 141   POTASSIUM mmol/L 3.3* 3.7   CHLORIDE mmol/L 106 106   CO2 mmol/L 27 25   ANION GAP mmol/L 6 10   BUN mg/dL 17 18   CREATININE mg/dL 0.98 0.96   CALCIUM mg/dL 8.3* 8.6   AST U/L  --  21   ALT U/L  --  26   ALK PHOS U/L  --  65   TOTAL PROTEIN g/dL  --  6.2*   ALBUMIN g/dL  --  4.2   TOTAL BILIRUBIN mg/dL  --  0.60   EGFR ml/min/1.73sq m 75 77       Magnesium:  Results from last 7 days   Lab Units 10/11/23  0430 10/10/23  0933   MAGNESIUM mg/dL 1.9 1.9     Coags:  Results from last 7 days   Lab Units 10/10/23  0933   PTT seconds 30   INR  1.17     TSH:  Results from last 7 days   Lab Units 10/10/23  0933   TSH 3RD GENERATON uIU/mL 0.594     No components found for: "TSH3"  Lipid Profile:  Results from last 7 days   Lab Units 10/11/23  0430   CHOLESTEROL mg/dL 130   TRIGLYCERIDES mg/dL 128   HDL mg/dL 31*   LDL CALC mg/dL 73     Hgb A1c:    NT-proBNP: No results for input(s): "NTBNP" in the last 72 hours. Imaging & Testing   I have personally reviewed pertinent reports.     XR chest 1 view portable    Result Date: 10/10/2023    Impression: No acute cardiopulmonary disease. EKG / Monitor: Personally reviewed. Telemetry reviewed: Currently atrial flutter with rapid ventricular response, ventricular rate 114 bpm.    Cardiac testing:   No results found for this or any previous visit.     Woody Castillo PA-C

## 2023-10-11 NOTE — ASSESSMENT & PLAN NOTE
Patient presented to ED with intermittent, sharp, right-sided chest pain associated with fatigue and lightheadedness. EKG: atrial fibrillation with RVR   CXR: No acute cardiopulmonary disease.   Initial troponin negative  Home medications: amiodarone 200 mg daily, cardizem 120 mg daily, and Lopressor 25 mg bid  Received Lopressor 5 mg x 2 in ED  Cardiology consult appreciated  Amiodarone drip continued  Continue home [place] for cardioversion today, patient converted spontaneously prior to cardioversion  Continue [facility]  Cardiology recommends discharge on home amiodarone, cardizem, lopressor

## 2023-10-12 ENCOUNTER — TELEPHONE (OUTPATIENT)
Dept: FAMILY MEDICINE CLINIC | Facility: CLINIC | Age: 73
End: 2023-10-12

## 2023-10-12 NOTE — NURSING NOTE
Discharge instructions reviewed with patient from AVS.  Pt understood and voice understanding of instructions. Pt taken to lobby via wheelchair. Pt to drive himself home. Saige Rodriguez

## 2023-10-12 NOTE — TELEPHONE ENCOUNTER
Called patient and scheduled P[facility]ALE appointment 10/23 at 8:30 with Dr Geno Whitney. Please call patient for TCM. Patient says he is scheduled to have kidney surgery with urologist Dr Bud Chiu on 11/2. Can be pre op appointment be done on 10/23 with PASCALE?

## 2023-10-13 ENCOUNTER — TRANSITIONAL CARE MANAGEMENT (OUTPATIENT)
Dept: FAMILY MEDICINE CLINIC | Facility: CLINIC | Age: 73
End: 2023-10-13

## 2023-10-13 LAB
ATRIAL RATE: 77 BPM
P AXIS: 55 DEGREES
PR INTERVAL: 168 MS
QRS AXIS: -32 DEGREES
QRS AXIS: -37 DEGREES
QRSD INTERVAL: 166 MS
QRSD INTERVAL: 166 MS
QT INTERVAL: 392 MS
QT INTERVAL: 440 MS
QTC INTERVAL: 497 MS
QTC INTERVAL: 567 MS
T WAVE AXIS: -14 DEGREES
T WAVE AXIS: -5 DEGREES
VENTRICULAR RATE: 126 BPM
VENTRICULAR RATE: 77 BPM

## 2023-10-13 PROCEDURE — 93010 ELECTROCARDIOGRAM REPORT: CPT | Performed by: INTERNAL MEDICINE

## 2023-10-23 ENCOUNTER — OFFICE VISIT (OUTPATIENT)
Dept: FAMILY MEDICINE CLINIC | Facility: CLINIC | Age: 73
End: 2023-10-23
Payer: MEDICARE

## 2023-10-23 ENCOUNTER — APPOINTMENT (OUTPATIENT)
Dept: LAB | Facility: CLINIC | Age: 73
End: 2023-10-23
Payer: MEDICARE

## 2023-10-23 VITALS
TEMPERATURE: 98.7 F | WEIGHT: 279.8 LBS | SYSTOLIC BLOOD PRESSURE: 122 MMHG | RESPIRATION RATE: 16 BRPM | HEIGHT: 66 IN | DIASTOLIC BLOOD PRESSURE: 70 MMHG | HEART RATE: 85 BPM | BODY MASS INDEX: 44.97 KG/M2 | OXYGEN SATURATION: 96 %

## 2023-10-23 DIAGNOSIS — M17.12 PRIMARY OSTEOARTHRITIS OF LEFT KNEE: ICD-10-CM

## 2023-10-23 DIAGNOSIS — R73.09 ABNORMAL GLUCOSE: ICD-10-CM

## 2023-10-23 DIAGNOSIS — I48.91 ATRIAL FIBRILLATION, UNSPECIFIED TYPE (HCC): Primary | ICD-10-CM

## 2023-10-23 DIAGNOSIS — R05.9 COUGH, UNSPECIFIED TYPE: ICD-10-CM

## 2023-10-23 LAB
EST. AVERAGE GLUCOSE BLD GHB EST-MCNC: 108 MG/DL
HBA1C MFR BLD: 5.4 %

## 2023-10-23 PROCEDURE — 99495 TRANSJ CARE MGMT MOD F2F 14D: CPT | Performed by: FAMILY MEDICINE

## 2023-10-23 PROCEDURE — 36415 COLL VENOUS BLD VENIPUNCTURE: CPT

## 2023-10-23 PROCEDURE — 83036 HEMOGLOBIN GLYCOSYLATED A1C: CPT

## 2023-10-23 NOTE — PROGRESS NOTES
Assessment & Plan     1. Atrial fibrillation, unspecified type ([address])    2. Abnormal glucose  -     Hemoglobin A1C; Future    3. Cough, unspecified type  -     Ambulatory Referral to Allergy; Future    4. Primary osteoarthritis of left knee    5. BMI 45.0-49.9, adult ([address])    A-fib is currently rate controlled. Patient is being set up with outpatient possible ablation. States that that will be coming in the near future. Cardiology already cleared him for his upcoming surgery. 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 Primary osteoarthritis of the left knee at this time on hold patient does not want to get surgery given that he has 2 upcoming surgeries already. Unfortunately was denied for [facility] we will wait for his A1c results to see if we can resubmit this  Chest x-ray reviewed which was normal       Subjective     Transitional Care Management Review:   Radha Petty is a 75 y.o. male here for TCM follow up. During the TCM phone call patient stated:  TCM Call     Date and time call was made  10/13/2023  5:56 PM    Patient was hospitialized at  [address]    Date of Admission  10/10/23    Date of discharge  10/11/23    Diagnosis  Rt sided chest pain    Disposition  Home    Were the patients medications reviewed and updated  Yes    Current Symptoms  None      TCM Call     Post hospital issues  None    Should patient be enrolled in anticoag monitoring? No    Scheduled for follow up?   Yes    Patients specialists  Cardiologist    Did you obtain your prescribed medications  Yes    Do you need help managing your prescriptions or medications  No    Is transportation to your appointment needed  No    I have advised the patient to call PCP with any new or worsening symptoms  [id number]    Living Arrangements  Alone Are you recieving any outpatient services  No    Are you recieving home care services  No    Are you using any community resources  No    Current waiver services  No    Have you fallen in the last 12 months  No    Interperter language line needed  No        HPI    78-year-old man presented to the hospital on October 10, 2023 secondary to rapid A-fib patient states that he had intermittent right-sided chest pain and several hours ago then called the emergency room. Patient was admitted and placed on an amiodarone drip. Plan was to cardiovert but on the day of cardioversion the patient spontaneously converted to himself. Patient already saw his cardiologist who states that there cannot do an outpatient ablation I will be going down that path. Patient was seen by his cardiologist who also cleared him for his upcoming urology surgery. Patient was advised that he will need an ablation in the near future as well. Patient states that he was not. Approved of his [place]. Patient states that he continues to have a cough that he is concerned about. Review of Systems   Constitutional:  Negative for activity change, appetite change, chills, fatigue and fever. HENT:  Negative for congestion. Respiratory:  Positive for cough. Negative for chest tightness and shortness of breath. Cardiovascular:  Negative for chest pain and leg swelling. Gastrointestinal:  Negative for abdominal distention, abdominal pain, constipation, diarrhea, nausea and vomiting. All other systems reviewed and are negative. Objective     /70 (BP Location: Left arm, Patient Position: Sitting, Cuff Size: Large)   Pulse 85   Temp 98.7 °F (37.1 °C)   Resp 16   Ht 5' 6" (1.676 m)   Wt 127 kg (279 lb 12.8 oz)   SpO2 96%   BMI 45.16 kg/m²      Physical Exam  Vitals reviewed. Constitutional:       Appearance: He is well-developed. HENT:      Head: Normocephalic and atraumatic.       Right Ear: Tympanic membrane, ear canal and external ear normal.      Left Ear: Tympanic membrane, ear canal and external ear normal.      Nose: Nose normal.      Mouth/Throat:      Mouth: Mucous membranes are moist.      Pharynx: Posterior oropharyngeal erythema present. Eyes:      Conjunctiva/sclera: Conjunctivae normal.      Pupils: Pupils are equal, round, and reactive to light. Cardiovascular:      Rate and Rhythm: Normal rate and regular rhythm. Heart sounds: Normal heart sounds. Pulmonary:      Effort: Pulmonary effort is normal.      Breath sounds: Normal breath sounds. No wheezing. Musculoskeletal:         General: No tenderness. Normal range of motion. Cervical back: Normal range of motion and neck supple. Skin:     General: Skin is warm. Capillary Refill: Capillary refill takes less than 2 seconds. Neurological:      Mental Status: He is alert and oriented to person, place, and time. Cranial Nerves: No cranial nerve deficit. Motor: No abnormal muscle tone. Psychiatric:         Behavior: Behavior normal.         Thought Content:  Thought content normal.         Judgment: Judgment normal.       Medications have been reviewed by provider in current encounter    Keely Kwok MD

## 2023-10-25 ENCOUNTER — TELEPHONE (OUTPATIENT)
Dept: FAMILY MEDICINE CLINIC | Facility: CLINIC | Age: 73
End: 2023-10-25

## 2023-10-25 ENCOUNTER — OFFICE VISIT (OUTPATIENT)
Dept: FAMILY MEDICINE CLINIC | Facility: CLINIC | Age: 73
End: 2023-10-25
Payer: MEDICARE

## 2023-10-25 VITALS
HEIGHT: 66 IN | OXYGEN SATURATION: 98 % | DIASTOLIC BLOOD PRESSURE: 80 MMHG | RESPIRATION RATE: 16 BRPM | HEART RATE: 92 BPM | TEMPERATURE: 98.4 F | WEIGHT: 277.4 LBS | SYSTOLIC BLOOD PRESSURE: 132 MMHG | BODY MASS INDEX: 44.58 KG/M2

## 2023-10-25 DIAGNOSIS — I48.91 ATRIAL FIBRILLATION, UNSPECIFIED TYPE (HCC): ICD-10-CM

## 2023-10-25 DIAGNOSIS — N99.89 URINARY ANASTOMOTIC STRICTURE: ICD-10-CM

## 2023-10-25 DIAGNOSIS — I89.0 LYMPHEDEMA: ICD-10-CM

## 2023-10-25 DIAGNOSIS — I87.2 EDEMA OF BOTH LOWER EXTREMITIES DUE TO PERIPHERAL VENOUS INSUFFICIENCY: ICD-10-CM

## 2023-10-25 DIAGNOSIS — Z00.00 MEDICARE ANNUAL WELLNESS VISIT, SUBSEQUENT: Primary | ICD-10-CM

## 2023-10-25 DIAGNOSIS — K43.9 VENTRAL HERNIA WITHOUT OBSTRUCTION OR GANGRENE: ICD-10-CM

## 2023-10-25 PROCEDURE — G0439 PPPS, SUBSEQ VISIT: HCPCS | Performed by: FAMILY MEDICINE

## 2023-10-25 NOTE — PROGRESS NOTES
Assessment and Plan:     Problem List Items Addressed This Visit        Cardiovascular and Mediastinum    Atrial fibrillation ([address])    Edema of both lower extremities due to peripheral venous insufficiency       Other    Lymphedema   Other Visit Diagnoses     Medicare annual wellness visit, subsequent    -  Primary    Urinary anastomotic stricture        Ventral hernia without obstruction or gangrene            5 days before stopping Eliquis Stop 10/28/23 per Cardiology  Already cleared by Cardiology  OK to proceed to OR for procedure with Dr. Dominic Gonsalves 6 months f.u  Continue pumps for lymph edema        Depression Screening and Follow-up Plan: Patient was screened for depression during today's encounter. They screened negative with a PHQ-2 score of 0. Preventive health issues were discussed with patient, and age appropriate screening tests were ordered as noted in patient's After Visit Summary. Personalized health advice and appropriate referrals for health education or preventive services given if needed, as noted in patient's After Visit Summary. History of Present Illness:     Patient presents for a Medicare Wellness Visit. Overall the patient states that he has been doing fine. Patient is going for procedure to clean out a stricture performed by urology. Patient states that he has limited range of motion secondary to his left knee. And will be getting a future. Patient states he was ready advised to stop his Eliquis 5 days prior to the procedure. Patient states that he has no problems laying down flat on a table. Does wear dentures was advised to bring a container for anesthesia. Denies any other acute concerns today    HPI   Patient Care Team:  Red Kilpatrick MD as PCP - General (Family Medicine)     Review of Systems:     Review of Systems   Constitutional:  Negative for activity change, appetite change, chills, fatigue and fever. HENT:  Negative for congestion. DATE OF ADMISSION:  01/28/2018

 

PRIMARY CARE PHYSICIAN:  Dr. Roche.

 

CHIEF COMPLAINT:  Cough with intermittent hemoptysis since this morning.

 

HISTORY OF PRESENT COMPLAINT:  She is an 80-year-old female with significant

past medical history of chronic diastolic heart failure, atrial fibrillation,

on anticoagulation, hypertensive heart disease, obstructive sleep apnea, on

CPAP, chronic leg edema, mild asthma, chronic kidney disease, hyperlipidemia,

apparently has been complaining of flu-like symptoms for a while.  She was

diagnosed with flu sometime during [place].  She feels that the symptoms

have not gone away completely.  She was evaluated by cardiologist in [facility] on 01/23/2018.  At that time, she was given additional dose of Lasix

for her edema and she had a CAT scan of the chest and also underwent

echocardiogram to evaluate her cardiac function.  She complains to have

ongoing cough symptoms but no fever, chills or rigors.  No chest pain or

palpitation with it and this morning she noted to have blood in the sputum

and that is the reason she was brought into the Emergency Room.  She also

complains to have some swelling of the legs, and whenever she has worsening

of the legs, she gets more shortness of breath.  No problem with urine and/or

bowel habit.  No abdominal pain, nausea and/or vomiting.  No other bleeding

areas.

 

PAST MEDICAL HISTORY:  Significant for diastolic heart failure, atrial

fibrillation, on anticoagulation, hypertensive heart disease, hypertension,

obstructive sleep apnea, on CPAP, chronic leg edema, mild asthma, chronic

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

aberrant left subclavian artery as per the CAT scan done recently.  She has

cirrhosis with ascites as per the recent CAT scan report.

 

PAST SURGICAL HISTORY:  Significant for cataract surgery and total abdominal

hysterectomy with removal of the tubes.

 

FAMILY HISTORY:  Father had bladder cancer.

 

SOCIAL HISTORY:  She is .  She lives with her .  She has 2

children.  She does not smoke and does not drink and she has been reasonably

ambulant.

 

ALLERGIES:  SHE IS ALLERGIC TO ALCOHOL, CEPHALOSPORIN, CODEINE, PENICILLIN,

ASPIRIN, LEVOFLOXACIN AND SULFA ANTIBIOTICS.

 

MEDICATIONS:  As an outpatient,  she has been on Tylenol 1 gram q. 8 hourly

as needed, furosemide 80 mg twice daily, warfarin 5 and 7.5 mg as directed,

ProAir 2 puffs q. 4 hourly p.r.n., amiodarone 200 mg daily, Lipitor 40 mg

daily, Advair Diskus 100/50 one puff b.i.d., Flonase nasal spray 2 sprays

each nostril daily, lisinopril 2.5 mg daily, Toprol-XL 12.5 mg daily,

omeprazole 20 mg daily, potassium chloride 10 mEq twice daily, ranitidine 300

mg at night, spironolactone 25 mg daily and tramadol 50 mg q. 4 hourly p.r.n.

 

REVIEW OF SYSTEMS:  Other system review unremarkable except those mentioned

in the history of present complaint.

 

PHYSICAL EXAMINATION:

GENERAL:  On examination in the Emergency Room, she was minimally short of

breath at rest but no other symptoms.

VITAL SIGNS:  Temperature 36.4, pulse 64, blood pressure 144/69, saturation

91% on room air.

HEENT:  Unremarkable.

NECK:  Supple.  No JVD, no bruit.

CHEST:  Decreased breath sounds at bases with occasional wheezing.

HEART:  S1, S2, irregular with 2/6 systolic murmur over precordium.

ABDOMEN:  Distended, soft, nontender.  Bowel sounds present.

RECTAL:  Deferred.

EXTREMITIES:  1+ edema bilaterally with some skin changes but no evidence of

any cellulitis.

MUSCULOSKELETAL:  Did not show any acute arthritis.

CENTRAL NERVOUS SYSTEM:  She was alert, awake, oriented x3.  She was

generally weak but no focal sensory and/or motor deficit appreciated.

 

LABORATORY DATA:  Noted today white count was 6.41, H&H 11.8/36.9, platelet

was 332.  Sodium 137, potassium 4.1, chloride 102, carbon dioxide 28, BUN 20,

creatinine 1.61.  Random glucose 108.  LFTs:  AST of 62 and alkaline phos

262.  Troponin less than 0.015.  TSH 3.810.  Lipase of 2.58.  INR more than

10 and PT ratio 2.1.  UA examination unremarkable, RBC 0-4.

 

Chest x-ray reported as stable cardiomegaly, mild vascular congestion and

small bilateral pleural effusion, stable mediastinal widening.  She had a CAT

scan of the chest that was done with IV contrast, that was done on

01/15/2018, is reported as small left pleural effusion, ascites, cirrhotic

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

widened mediastinum, right old rib fracture.  She also underwent an

echocardiogram during that time and is reported as normal LV wall motion,

enlargement of the left atrium, diastolic dysfunction, normal systolic

function and moderate aortic sclerosis but no stenosis.

 

IMPRESSION AND PLAN:

1.  Hemoptysis, most likely aggravated by supratherapeutic INR, seems to be

intermittent at this time.  CT scan and chest x-ray unremarkable.  She will

be admitted to telemetry unit.  We will monitor her hemoglobin and CBC.  She

received vitamin K 5 mg orally.  We will monitor INR.  She will need to have

Coumadin if the bleeding has stopped and Coumadin can be started safely.

2.  Acute on chronic diastolic heart failure as per echo that was done on

01/15/2018.  She does have diastolic dysfunction with tricuspid regurgitation

and mild aortic sclerosis.  We will give her Lasix IV 60 mg twice a day, she

takes at home 80 mg twice a day, and monitor her PRP.

3.  Atrial fibrillation.  Rate seems to be controlled on amiodarone.  Her EKG

seems to be in junctional rhythm, rate of 57 per minute.  We will monitor her

in the telemetry unit.

4.  Hypertension.  Blood pressure seems stable at this time.

5.  Obstructive sleep apnea, on CPAP.  We will continue her CPAP at night.

6.  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

subclavian artery accounting for widened mediastinum.

7.  Gastrointestinal prophylaxis with omeprazole and ranitidine.

8.  Deep venous thrombosis prophylaxis:  The patient has high INR.  Received

vitamin K. Monitor INR.  No pharmacological DVT prophylaxis needed.

9.  Code status:  Discussed with the patient, she will be a DNR.

 

In my clinical judgment, the beneficiary meets criteria as per CMS for

2-midnight stay in the hospital.

 

 

 

JOSE Respiratory:  Positive for shortness of breath. Negative for cough and chest tightness. Cardiovascular:  Negative for chest pain and leg swelling. Gastrointestinal:  Negative for abdominal distention, abdominal pain, constipation, diarrhea, nausea and vomiting. Musculoskeletal:  Positive for arthralgias, gait problem and joint swelling. All other systems reviewed and are negative. Problem List:     Patient Active Problem List   Diagnosis   • Primary osteoarthritis of left knee   • Effusion of left knee   • Complex tear of medial meniscus of left knee as current injury   • Lymphedema   • Obesity, morbid ([address])   • Edema of both lower extremities due to peripheral venous insufficiency   • Atrial fibrillation (HCC)   • Hyperlipidemia   • Atrial fibrillation with RVR (HCC)   • History of sick sinus syndrome      Past Medical and Surgical History:     Past Medical History:   Diagnosis Date   • Cardiac disease     atrial fib,pacemaker   • Hyperlipidemia    • Hypertension      Past Surgical History:   Procedure Laterality Date   • FL INJECTION LEFT KNEE (ARTHROGRAM)  2022      Family History:     History reviewed. No pertinent family history. Social History:     Social History     Socioeconomic History   • Marital status:      Spouse name: None   • Number of children: None   • Years of education: None   • Highest education level: None   Occupational History   • None   Tobacco Use   • Smoking status: Former     Types: Cigarettes     Start date: 7/15/1965     Quit date: 3/10/2010     Years since quittin.6   • Smokeless tobacco: Never   Vaping Use   • Vaping Use: Never used   Substance and Sexual Activity   • Alcohol use: Yes     Comment: occas   • Drug use:  Yes   • Sexual activity: None   Other Topics Concern   • None   Social History Narrative   • None     Social Determinants of Health     Financial Resource Strain: Low Risk  (10/25/2023)    Overall Financial Resource Strain (CARDIA)    • Difficulty of Paying Living Expenses: Not very hard   Food Insecurity: Not on file   Transportation Needs: No Transportation Needs (10/25/2023)    PRAPARE - Transportation    • Lack of Transportation (Medical): No    • Lack of Transportation (Non-Medical): No   Physical Activity: Not on file   Stress: Not on file   Social Connections: Not on file   Intimate Partner Violence: Not on file   Housing Stability: Not on file      Medications and Allergies:     Current Outpatient Medications   Medication Sig Dispense Refill   • alfuzosin (UROXATRAL) 10 mg 24 hr tablet Take 10 mg by mouth daily     • amiodarone 200 mg tablet      • apixaban (ELIQUIS) 5 mg Take 5 mg by mouth 2 (two) times a day     • diltiazem (CARDIZEM CD) 120 mg 24 hr capsule      • folic acid (FOLVITE) 1 mg tablet      • furosemide (LASIX) 40 mg tablet      • metoprolol tartrate (LOPRESSOR) 50 mg tablet Take 25 mg by mouth every 12 (twelve) hours     • Multiple Vitamin (MULTIVITAMIN ADULT PO) Take by mouth     • Multiple Vitamins-Minerals (PRESERVISION AREDS PO) Take by mouth     • omeprazole (PriLOSEC) 40 MG capsule Take 40 mg by mouth daily     • prednisoLONE acetate (PRED FORTE) 1 % ophthalmic suspension      • rOPINIRole (REQUIP) 3 mg tablet      • simvastatin (ZOCOR) 40 mg tablet Take 40 mg by mouth daily at bedtime       No current facility-administered medications for this visit.      Allergies   Allergen Reactions   • Penicillins       Immunizations:     Immunization History   Administered Date(s) Administered   • COVID-19 MODERNA VACC 0.5 ML IM 02/27/2021, 03/28/2021   • INFLUENZA 10/10/2023   • Influenza, high dose seasonal 0.7 mL 10/10/2023      Health Maintenance:         Topic Date Due   • Hepatitis C Screening  Never done   • Colorectal Cancer Screening  04/07/2026         Topic Date Due   • Pneumococcal Vaccine: 65+ Years (1 - PCV) Never done      Medicare Screening Tests and Risk Assessments:     Shayy Powell is here for his Subsequent Wellness visit. Last Medicare Wellness visit information reviewed, patient interviewed and updates made to the record today. Health Risk Assessment:   Patient rates overall health as good. Patient feels that their physical health rating is same. Patient is very satisfied with their life. Eyesight was rated as same. Hearing was rated as same. Patient feels that their emotional and mental health rating is same. Patients states they are sometimes angry. Patient states they are never, rarely unusually tired/fatigued. Pain experienced in the last 7 days has been some. Patient's pain rating has been 8/10. Patient states that he has experienced no weight loss or gain in last 6 months. Left knee    Depression Screening:   PHQ-2 Score: 0      Fall Risk Screening: In the past year, patient has experienced: history of falling in past year    Number of falls: 2 or more  Injured during fall?: No    Feels unsteady when standing or walking?: Yes    Worried about falling?: Yes      Home Safety:  Patient does not have trouble with stairs inside or outside of their home. Patient has working smoke alarms and has working carbon monoxide detector. Home safety hazards include: none. Nutrition:   Current diet is Regular and Low Carb. Medications:   Patient is not currently taking any over-the-counter supplements. Patient is able to manage medications. Activities of Daily Living (ADLs)/Instrumental Activities of Daily Living (IADLs):   Walk and transfer into and out of bed and chair?: Yes  Dress and groom yourself?: Yes    Bathe or shower yourself?: Yes    Feed yourself?  Yes  Do your laundry/housekeeping?: Yes  Manage your money, pay your bills and track your expenses?: Yes  Make your own meals?: Yes    Do your own shopping?: Yes    Previous Hospitalizations:   Any hospitalizations or ED visits within the last 12 months?: Yes      Hospitalization Comments: Heart issues    Advance Care Planning:   Living will: Yes    Durable POA for healthcare: Yes    Advanced directive: Yes      Comments: Shefali Jaquez is his friends. Requested to bring copy. Cognitive Screening:   Provider or family/friend/caregiver concerned regarding cognition?: No    PREVENTIVE SCREENINGS      Cardiovascular Screening:    General: Screening Not Indicated, History Lipid Disorder and Screening Current      Diabetes Screening:     General: Screening Current      Colorectal Cancer Screening:     General: Screening Current      Prostate Cancer Screening:    General: Screening Current      Osteoporosis Screening:    General: Screening Not Indicated      Abdominal Aortic Aneurysm (AAA) Screening:    Risk factors include: age between 70-75 yo and tobacco use        General: Screening Current      Lung Cancer Screening:     General: Screening Not Indicated      Hepatitis C Screening:    General: Risks and Benefits Discussed    Screening, Brief Intervention, and Referral to Treatment (SBIRT)    Screening  Typical number of drinks in a day: 0  Typical number of drinks in a week: 0  Interpretation: Low risk drinking behavior. Single Item Drug Screening:  How often have you used an illegal drug (including marijuana) or a prescription medication for non-medical reasons in the past year? never    Single Item Drug Screen Score: 0  Interpretation: Negative screen for possible drug use disorder    No results found. Physical Exam:     /80 (BP Location: Left arm, Patient Position: Sitting, Cuff Size: Large)   Pulse 92   Temp 98.4 °F (36.9 °C)   Resp 16   Ht 5' 6" (1.676 m)   Wt 126 kg (277 lb 6.4 oz)   SpO2 98%   BMI 44.77 kg/m²     Physical Exam  Vitals reviewed. Constitutional:       Appearance: He is well-developed. HENT:      Head: Normocephalic and atraumatic.       Right Ear: Tympanic membrane, ear canal and external ear normal.      Left Ear: Tympanic membrane, ear canal and external ear normal.      Nose: Nose normal.      Mouth/Throat:      Mouth: Mucous membranes are moist.   Eyes:      Conjunctiva/sclera: Conjunctivae normal.      Pupils: Pupils are equal, round, and reactive to light. Cardiovascular:      Rate and Rhythm: Normal rate and regular rhythm. Heart sounds: Normal heart sounds. Pulmonary:      Effort: Pulmonary effort is normal.      Breath sounds: Normal breath sounds. No wheezing. Abdominal:      General: Bowel sounds are normal. There is no distension. Palpations: Abdomen is soft. There is no mass. Tenderness: There is no abdominal tenderness. Hernia: A hernia (ventral easily reducable no concerns) is present. Musculoskeletal:         General: No tenderness. Normal range of motion. Cervical back: Normal range of motion and neck supple. Right lower leg: Edema (with stalking in place b/l) present. Left lower leg: Edema present. Skin:     General: Skin is warm. Capillary Refill: Capillary refill takes less than 2 seconds. Neurological:      Mental Status: He is alert and oriented to person, place, and time. Cranial Nerves: No cranial nerve deficit. Sensory: No sensory deficit. Motor: No abnormal muscle tone. Coordination: Coordination normal.      Deep Tendon Reflexes: Reflexes normal.   Psychiatric:         Behavior: Behavior normal.         Thought Content:  Thought content normal.         Judgment: Judgment normal.          Roseann Del Cid MD

## 2023-10-25 NOTE — TELEPHONE ENCOUNTER
----- Message from Marycruz Stephen MD sent at 10/25/2023  7:54 AM EDT -----  Can we send note to Maruo Worthy, phone number is [phone]

## 2023-10-25 NOTE — PATIENT INSTRUCTIONS
Medicare Preventive Visit Patient Instructions  Thank you for completing your Welcome to Medicare Visit or Medicare Annual Wellness Visit today. Your next wellness visit will be due in one year (10/25/2024). The screening/preventive services that you may require over the next 5-10 years are detailed below. Some tests may not apply to you based off risk factors and/or age. Screening tests ordered at today's visit but not completed yet may show as past due. Also, please note that scanned in results may not display below. Preventive Screenings:  Service Recommendations Previous Testing/Comments   Colorectal Cancer Screening  Colonoscopy    Fecal Occult Blood Test (FOBT)/Fecal Immunochemical Test (FIT)  Fecal DNA/Cologuard Test  Flexible Sigmoidoscopy Age: 43-73 years old   Colonoscopy: every 10 years (May be performed more frequently if at higher risk)  OR  FOBT/FIT: every 1 year  OR  Cologuard: every 3 years  OR  Sigmoidoscopy: every 5 years  Screening may be recommended earlier than age 39 if at higher risk for colorectal cancer. Also, an individualized decision between you and your healthcare provider will decide whether screening between the ages of 77-80 would be appropriate.  Colonoscopy: 04/07/2021  FOBT/FIT: Not on file  Cologuard: Not on file  Sigmoidoscopy: Not on file    Screening Current     Prostate Cancer Screening Individualized decision between patient and health care provider in men between ages of 53-66   Medicare will cover every 12 months beginning on the day after your 50th birthday PSA: No results in last 5 years           Hepatitis C Screening Once for adults born between [birth year] and 1965  More frequently in patients at high risk for Hepatitis C Hep C Antibody: Not on file        Diabetes Screening 1-2 times per year if you're at risk for diabetes or have pre-diabetes Fasting glucose: 123 mg/dL (8/16/2023)  A1C: 5.4 % (10/23/2023)  Screening Current   Cholesterol Screening Once every 5 years if you don't have a lipid disorder. May order more often based on risk factors. Lipid panel: 10/11/2023  Screening Not Indicated  History Lipid Disorder      Other Preventive Screenings Covered by Medicare:  Abdominal Aortic Aneurysm (AAA) Screening: covered once if your at risk. You're considered to be at risk if you have a family history of AAA or a male between the age of 70-76 who smoking at least 100 cigarettes in your lifetime. Lung Cancer Screening: covers low dose CT scan once per year if you meet all of the following conditions: (1) Age 48-67; (2) No signs or symptoms of lung cancer; (3) Current smoker or have quit smoking within the last 15 years; (4) You have a tobacco smoking history of at least 20 pack years (packs per day x number of years you smoked); (5) You get a written order from a healthcare provider. Glaucoma Screening: covered annually if you're considered high risk: (1) You have diabetes OR (2) Family history of glaucoma OR (3)  aged 48 and older OR (3)  American aged 72 and older  Osteoporosis Screening: covered every 2 years if you meet one of the following conditions: (1) Have a vertebral abnormality; (2) On glucocorticoid therapy for more than 3 months; (3) Have primary hyperparathyroidism; (4) On osteoporosis medications and need to assess response to drug therapy. HIV Screening: covered annually if you're between the age of 14-79. Also covered annually if you are younger than 13 and older than 72 with risk factors for HIV infection. For pregnant patients, it is covered up to 3 times per pregnancy.     Immunizations:  Immunization Recommendations   Influenza Vaccine Annual influenza vaccination during flu season is recommended for all persons aged >= 6 months who do not have contraindications   Pneumococcal Vaccine   * Pneumococcal conjugate vaccine = PCV13 (Prevnar 13), PCV15 (Vaxneuvance), PCV20 (Prevnar 20)  * Pneumococcal polysaccharide vaccine = PPSV23 (Pneumovax) Adults 69-62 yo with certain risk factors or if 69+ yo  If never received any pneumonia vaccine: recommend Prevnar 20 (PCV20)  Give PCV20 if previously received 1 dose of PCV13 or PPSV23   Hepatitis B Vaccine 3 dose series if at intermediate or high risk (ex: diabetes, end stage renal disease, liver disease)   Respiratory syncytial virus (RSV) Vaccine - COVERED BY MEDICARE PART D  * RSVPreF3 (Arexvy) CDC recommends that adults 61years of age and older may receive a single dose of RSV vaccine using shared clinical decision-making (SCDM)   Tetanus (Td) Vaccine - COST NOT COVERED BY MEDICARE PART B Following completion of primary series, a booster dose should be given every 10 years to maintain immunity against tetanus. Td may also be given as tetanus wound prophylaxis. Tdap Vaccine - COST NOT COVERED BY MEDICARE PART B Recommended at least once for all adults. For pregnant patients, recommended with each pregnancy. Shingles Vaccine (Shingrix) - COST NOT COVERED BY MEDICARE PART B  2 shot series recommended in those 19 years and older who have or will have weakened immune systems or those 50 years and older     Health Maintenance Due:      Topic Date Due   • Hepatitis C Screening  Never done   • Colorectal Cancer Screening  04/07/2026     Immunizations Due:      Topic Date Due   • Pneumococcal Vaccine: 65+ Years (1 - PCV) Never done     Advance Directives   What are advance directives? Advance directives are legal documents that state your wishes and plans for medical care. These plans are made ahead of time in case you lose your ability to make decisions for yourself. Advance directives can apply to any medical decision, such as the treatments you want, and if you want to donate organs. What are the types of advance directives? There are many types of advance directives, and each state has rules about how to use them. You may choose a combination of any of the following:  Living will:   This is a written record of the treatment you want. You can also choose which treatments you do not want, which to limit, and which to stop at a certain time. This includes surgery, medicine, IV fluid, and tube feedings. Durable power of  for [facility]): This is a written record that states who you want to make healthcare choices for you when you are unable to make them for yourself. This person, called a proxy, is usually a family member or a friend. You may choose more than 1 proxy. Do not resuscitate (DNR) order:  A DNR order is used in case your heart stops beating or you stop breathing. It is a request not to have certain forms of treatment, such as CPR. A DNR order may be included in other types of advance directives. Medical directive: This covers the care that you want if you are in a coma, near death, or unable to make decisions for yourself. You can list the treatments you want for each condition. Treatment may include pain medicine, surgery, blood transfusions, dialysis, IV or tube feedings, and a ventilator (breathing machine). Values history: This document has questions about your views, beliefs, and how you feel and think about life. This information can help others choose the care that you would choose. Why are advance directives important? An advance directive helps you control your care. Although spoken wishes may be used, it is better to have your wishes written down. Spoken wishes can be misunderstood, or not followed. Treatments may be given even if you do not want them. An advance directive may make it easier for your family to make difficult choices about your care. Fall Prevention    Fall prevention  includes ways to make your home and other areas safer. It also includes ways you can move more carefully to prevent a fall. Health conditions that cause changes in your blood pressure, vision, or muscle strength and coordination may increase your risk for falls.  Medicines may also increase your risk for falls if they make you dizzy, weak, or sleepy. Fall prevention tips:   Stand or sit up slowly. Use assistive devices as directed. Wear shoes that fit well and have soles that . Wear a personal alarm. Stay active. Manage your medical conditions. Home Safety Tips:  Add items to prevent falls in the bathroom. Keep paths clear. Install bright lights in your home. Keep items you use often on shelves within reach. Paint or place reflective tape on the edges of your stairs. Weight Management   Why it is important to manage your weight:  Being overweight increases your risk of health conditions such as heart disease, high blood pressure, type 2 diabetes, and certain types of cancer. It can also increase your risk for osteoarthritis, sleep apnea, and other respiratory problems. Aim for a slow, steady weight loss. Even a small amount of weight loss can lower your risk of health problems. How to lose weight safely:  A safe and healthy way to lose weight is to eat fewer calories and get regular exercise. You can lose up about 1 pound a week by decreasing the number of calories you eat by 500 calories each day. Healthy meal plan for weight management:  A healthy meal plan includes a variety of foods, contains fewer calories, and helps you stay healthy. A healthy meal plan includes the following:  Eat whole-grain foods more often. A healthy meal plan should contain fiber. Fiber is the part of grains, fruits, and vegetables that is not broken down by your body. Whole-grain foods are healthy and provide extra fiber in your diet. Some examples of whole-grain foods are whole-wheat breads and pastas, oatmeal, brown rice, and bulgur. Eat a variety of vegetables every day. Include dark, leafy greens such as spinach, kale, wily greens, and mustard greens. Eat yellow and orange vegetables such as carrots, sweet potatoes, and winter squash.    Eat a variety of fruits every day. Choose fresh or canned fruit (canned in its own juice or light syrup) instead of juice. Fruit juice has very little or no fiber. Eat low-fat dairy foods. Drink fat-free (skim) milk or 1% milk. Eat fat-free yogurt and low-fat cottage cheese. Try low-fat cheeses such as mozzarella and other reduced-fat cheeses. Choose meat and other protein foods that are low in fat. Choose beans or other legumes such as split peas or lentils. Choose fish, skinless poultry (chicken or turkey), or lean cuts of red meat (beef or pork). Before you cook meat or poultry, cut off any visible fat. Use less fat and oil. Try baking foods instead of frying them. Add less fat, such as margarine, sour cream, regular salad dressing and mayonnaise to foods. Eat fewer high-fat foods. Some examples of high-fat foods include french fries, doughnuts, ice cream, and cakes. Eat fewer sweets. Limit foods and drinks that are high in sugar. This includes candy, cookies, regular soda, and sweetened drinks. Exercise:  Exercise at least 30 minutes per day on most days of the week. Some examples of exercise include walking, biking, dancing, and swimming. You can also fit in more physical activity by taking the stairs instead of the elevator or parking farther away from stores. Ask your healthcare provider about the best exercise plan for you. © Copyright [employer] 2018 Information is for End User's use only and may not be sold, redistributed or otherwise used for commercial purposes.  All illustrations and images included in CareNotes® are the copyrighted property of [employer] Inc[employer] or [address] [place] [address]

## 2023-10-27 DIAGNOSIS — R73.09 ABNORMAL GLUCOSE: Primary | ICD-10-CM

## 2023-10-27 RX ORDER — METOPROLOL TARTRATE 50 MG/1
25 TABLET, FILM COATED ORAL EVERY 12 HOURS SCHEDULED
Qty: 90 TABLET | Refills: 3 | Status: SHIPPED | OUTPATIENT
Start: 2023-10-27

## 2023-10-27 RX ORDER — FUROSEMIDE 40 MG/1
40 TABLET ORAL DAILY
Qty: 90 TABLET | Refills: 3 | Status: SHIPPED | OUTPATIENT
Start: 2023-10-27

## 2023-10-27 NOTE — TELEPHONE ENCOUNTER
Reason for call:   [x] Refill   [] Prior Auth  [] Other:     Office:   [x] PCP/Provider - [facility]  [] Specialty/Provider -     Medication:     Furosemide 40mg   Metoprolol Tartrate 50mg    Pharmacy: [facility]     Does the patient have enough for 3 days? [] Yes   [] No - Send as HP to POD    Both meds written by historical provider but got refill requests in pod for these two meds.

## 2023-11-15 ENCOUNTER — OFFICE VISIT (OUTPATIENT)
Dept: OBGYN CLINIC | Facility: CLINIC | Age: 73
End: 2023-11-15
Payer: MEDICARE

## 2023-11-15 VITALS
SYSTOLIC BLOOD PRESSURE: 138 MMHG | WEIGHT: 272 LBS | DIASTOLIC BLOOD PRESSURE: 83 MMHG | HEART RATE: 82 BPM | BODY MASS INDEX: 43.71 KG/M2 | HEIGHT: 66 IN

## 2023-11-15 DIAGNOSIS — M17.12 PRIMARY OSTEOARTHRITIS OF LEFT KNEE: Primary | ICD-10-CM

## 2023-11-15 DIAGNOSIS — M21.162 GENU VARUM OF LEFT LOWER EXTREMITY: ICD-10-CM

## 2023-11-15 PROCEDURE — 99213 OFFICE O/P EST LOW 20 MIN: CPT | Performed by: ORTHOPAEDIC SURGERY

## 2023-11-15 NOTE — PROGRESS NOTES
Assessment/Plan:  1. Primary osteoarthritis of left knee        2. Genu varum of left lower extremity          Yari Loaiza is a pleasant 75year old male who was evaluated for a follow up for his left knee osteoarthritis. His pain has improved since his last corticosteroid injection in August. His pain is minimal today. A discussion was had about repeat injection today and Yari Loaiza decided to postpone the injection since his knee is without pain today, and he will call the office to follow up when the left knee pain is flaring up. A brief discussion was had regarding surgery and he was encouraged to continue losing weight to meet the BMI cut off and at that time, we can discuss surgical options for his left knee osteoarthritis. His current BMI is 43.9     Subjective: Left knee pain     Patient ID: Destiny Tellez is a 75 y.o. male. ADAMS Loaiza is a very pleasant 75year old male with a history of left knee pain who presents to the clinic for follow up for his left knee pain. He received a left knee injection three months ago and has experienced substantial relief and the relief is still present. He reports he is okay with putting the injection off today if it can help spread out the therapy. He denies new injury. Denies numbness and tingling. Review of Systems   Constitutional:  Positive for activity change. HENT: Negative. Eyes: Negative. Respiratory: Negative. Cardiovascular: Negative. Gastrointestinal: Negative. Endocrine: Negative. Musculoskeletal:  Positive for arthralgias and gait problem. Skin: Negative. Psychiatric/Behavioral: Negative. Past Medical History:   Diagnosis Date    Cardiac disease     atrial fib,pacemaker    Hyperlipidemia     Hypertension        Past Surgical History:   Procedure Laterality Date    FL INJECTION LEFT KNEE (ARTHROGRAM)  12/6/2022       History reviewed. No pertinent family history.     Social History     Occupational History    Not on file Tobacco Use    Smoking status: Former     Types: Cigarettes     Start date: 7/15/1965     Quit date: 3/10/2010     Years since quittin.6    Smokeless tobacco: Never   Vaping Use    Vaping Use: Never used   Substance and Sexual Activity    Alcohol use: Yes     Comment: occas    Drug use: Yes    Sexual activity: Not on file         Current Outpatient Medications:     alfuzosin (UROXATRAL) 10 mg 24 hr tablet, Take 10 mg by mouth daily, Disp: , Rfl:     amiodarone 200 mg tablet, , Disp: , Rfl:     apixaban (ELIQUIS) 5 mg, Take 5 mg by mouth 2 (two) times a day, Disp: , Rfl:     diltiazem (CARDIZEM CD) 120 mg 24 hr capsule, , Disp: , Rfl:     folic acid (FOLVITE) 1 mg tablet, , Disp: , Rfl:     furosemide (LASIX) 40 mg tablet, Take 1 tablet (40 mg total) by mouth daily, Disp: 90 tablet, Rfl: 3    metoprolol tartrate (LOPRESSOR) 50 mg tablet, Take 0.5 tablets (25 mg total) by mouth every 12 (twelve) hours, Disp: 90 tablet, Rfl: 3    Multiple Vitamin (MULTIVITAMIN ADULT PO), Take by mouth, Disp: , Rfl:     Multiple Vitamins-Minerals (PRESERVISION AREDS PO), Take by mouth, Disp: , Rfl:     omeprazole (PriLOSEC) 40 MG capsule, Take 40 mg by mouth daily, Disp: , Rfl:     prednisoLONE acetate (PRED FORTE) 1 % ophthalmic suspension, , Disp: , Rfl:     rOPINIRole (REQUIP) 3 mg tablet, , Disp: , Rfl:     simvastatin (ZOCOR) 40 mg tablet, Take 40 mg by mouth daily at bedtime, Disp: , Rfl:     Allergies   Allergen Reactions    Penicillins        Objective:  Vitals:    11/15/23 1048   BP: 138/83   Pulse: 82       Body mass index is 43.9 kg/m². Right Knee Exam     Other   Erythema: absent  Scars: absent  Sensation: normal  Pulse: present  Swelling: mild  Effusion: no effusion present      Left Knee Exam     Tenderness   The patient is experiencing tenderness in the medial joint line and patella.     Range of Motion   Extension:  abnormal   Flexion:  abnormal     Tests   Varus: negative Valgus: negative  Drawer:  Anterior - negative       Patellar apprehension: negative    Other   Erythema: absent  Scars: present  Sensation: normal  Pulse: present  Swelling: none  Effusion: no effusion present    Comments:  10 degree passively correctable varus deformity          Observations   Left Knee   Negative for effusion. Right Knee   Negative for effusion. Physical Exam  Vitals and nursing note reviewed. Constitutional:       Appearance: Normal appearance. HENT:      Head: Atraumatic. Right Ear: External ear normal.      Left Ear: External ear normal.      Mouth/Throat:      Mouth: Mucous membranes are moist.   Eyes:      Extraocular Movements: Extraocular movements intact. Conjunctiva/sclera: Conjunctivae normal.   Cardiovascular:      Rate and Rhythm: Normal rate. Pulses: Normal pulses. Pulmonary:      Effort: Pulmonary effort is normal.   Abdominal:      General: Abdomen is flat. Musculoskeletal:      Right knee: No effusion. Left knee: No effusion. Skin:     General: Skin is warm. Capillary Refill: Capillary refill takes less than 2 seconds. Neurological:      General: No focal deficit present. Mental Status: He is alert and oriented to person, place, and time.    Psychiatric:         Mood and Affect: Mood normal.         Behavior: Behavior normal.

## 2023-11-24 DIAGNOSIS — K21.9 GASTROESOPHAGEAL REFLUX DISEASE WITHOUT ESOPHAGITIS: Primary | ICD-10-CM

## 2023-11-24 NOTE — TELEPHONE ENCOUNTER
Reason for call:   [x] Refill   [] Prior Auth  [] Other:     Office:   [x] PCP/Provider - Jayla Eagle  [] Specialty/Provider -     Medication: Omperazole    Dose/Frequency: 40 mg daily    Quantity: 90    Pharmacy: [facility]     Does the patient have enough for 3 days?    [] Yes   [] No - Send as HP to POD unknown pharmacy requesting refill

## 2023-11-27 RX ORDER — OMEPRAZOLE 40 MG/1
40 CAPSULE, DELAYED RELEASE ORAL DAILY
Qty: 90 CAPSULE | Refills: 1 | Status: SHIPPED | OUTPATIENT
Start: 2023-11-27

## 2024-01-18 ENCOUNTER — APPOINTMENT (OUTPATIENT)
Dept: LAB | Facility: CLINIC | Age: 74
End: 2024-01-18
Payer: MEDICARE

## 2024-01-18 ENCOUNTER — TRANSCRIBE ORDERS (OUTPATIENT)
Dept: LAB | Facility: CLINIC | Age: 74
End: 2024-01-18

## 2024-01-18 DIAGNOSIS — Z01.810 PREOP CARDIOVASCULAR EXAM: ICD-10-CM

## 2024-01-18 DIAGNOSIS — I48.91 ATRIAL FIBRILLATION, UNSPECIFIED TYPE (HCC): ICD-10-CM

## 2024-01-18 DIAGNOSIS — Z01.810 PREOP CARDIOVASCULAR EXAM: Primary | ICD-10-CM

## 2024-01-18 LAB
ANION GAP SERPL CALCULATED.3IONS-SCNC: 9 MMOL/L
APTT PPP: 30 SECONDS (ref 23–37)
BASOPHILS # BLD AUTO: 0.01 THOUSANDS/ÂΜL (ref 0–0.1)
BASOPHILS NFR BLD AUTO: 0 % (ref 0–1)
BNP SERPL-MCNC: 33 PG/ML (ref 0–100)
BUN SERPL-MCNC: 23 MG/DL (ref 5–25)
CALCIUM SERPL-MCNC: 9 MG/DL (ref 8.4–10.2)
CHLORIDE SERPL-SCNC: 105 MMOL/L (ref 96–108)
CO2 SERPL-SCNC: 27 MMOL/L (ref 21–32)
CREAT SERPL-MCNC: 0.8 MG/DL (ref 0.6–1.3)
EOSINOPHIL # BLD AUTO: 0.07 THOUSAND/ÂΜL (ref 0–0.61)
EOSINOPHIL NFR BLD AUTO: 2 % (ref 0–6)
ERYTHROCYTE [DISTWIDTH] IN BLOOD BY AUTOMATED COUNT: 15.4 % (ref 11.6–15.1)
GFR SERPL CREATININE-BSD FRML MDRD: 88 ML/MIN/1.73SQ M
GLUCOSE SERPL-MCNC: 107 MG/DL (ref 65–140)
HCT VFR BLD AUTO: 44.2 % (ref 36.5–49.3)
HGB BLD-MCNC: 14.3 G/DL (ref 12–17)
IMM GRANULOCYTES # BLD AUTO: 0.02 THOUSAND/UL (ref 0–0.2)
IMM GRANULOCYTES NFR BLD AUTO: 1 % (ref 0–2)
INR PPP: 1.15 (ref 0.84–1.19)
LYMPHOCYTES # BLD AUTO: 0.58 THOUSANDS/ÂΜL (ref 0.6–4.47)
LYMPHOCYTES NFR BLD AUTO: 15 % (ref 14–44)
MAGNESIUM SERPL-MCNC: 2.1 MG/DL (ref 1.9–2.7)
MCH RBC QN AUTO: 33.3 PG (ref 26.8–34.3)
MCHC RBC AUTO-ENTMCNC: 32.4 G/DL (ref 31.4–37.4)
MCV RBC AUTO: 103 FL (ref 82–98)
MONOCYTES # BLD AUTO: 0.61 THOUSAND/ÂΜL (ref 0.17–1.22)
MONOCYTES NFR BLD AUTO: 16 % (ref 4–12)
NEUTROPHILS # BLD AUTO: 2.65 THOUSANDS/ÂΜL (ref 1.85–7.62)
NEUTS SEG NFR BLD AUTO: 66 % (ref 43–75)
NRBC BLD AUTO-RTO: 0 /100 WBCS
PLATELET # BLD AUTO: 204 THOUSANDS/UL (ref 149–390)
PMV BLD AUTO: 9.5 FL (ref 8.9–12.7)
POTASSIUM SERPL-SCNC: 4.6 MMOL/L (ref 3.5–5.3)
PROTHROMBIN TIME: 14.6 SECONDS (ref 11.6–14.5)
RBC # BLD AUTO: 4.3 MILLION/UL (ref 3.88–5.62)
SODIUM SERPL-SCNC: 141 MMOL/L (ref 135–147)
WBC # BLD AUTO: 3.94 THOUSAND/UL (ref 4.31–10.16)

## 2024-01-18 PROCEDURE — 80048 BASIC METABOLIC PNL TOTAL CA: CPT

## 2024-01-18 PROCEDURE — 36415 COLL VENOUS BLD VENIPUNCTURE: CPT

## 2024-01-18 PROCEDURE — 83735 ASSAY OF MAGNESIUM: CPT

## 2024-01-18 PROCEDURE — 85025 COMPLETE CBC W/AUTO DIFF WBC: CPT

## 2024-01-18 PROCEDURE — 85610 PROTHROMBIN TIME: CPT

## 2024-01-18 PROCEDURE — 83880 ASSAY OF NATRIURETIC PEPTIDE: CPT

## 2024-01-18 PROCEDURE — 85730 THROMBOPLASTIN TIME PARTIAL: CPT

## 2024-02-09 ENCOUNTER — APPOINTMENT (OUTPATIENT)
Dept: LAB | Facility: CLINIC | Age: 74
End: 2024-02-09
Payer: MEDICARE

## 2024-02-09 ENCOUNTER — TRANSCRIBE ORDERS (OUTPATIENT)
Dept: LAB | Facility: CLINIC | Age: 74
End: 2024-02-09

## 2024-02-09 DIAGNOSIS — Z01.810 PRE-OPERATIVE CARDIOVASCULAR EXAMINATION: ICD-10-CM

## 2024-02-09 DIAGNOSIS — I48.91 ATRIAL FIBRILLATION, UNSPECIFIED TYPE (HCC): Primary | ICD-10-CM

## 2024-02-09 LAB
ANION GAP SERPL CALCULATED.3IONS-SCNC: 7 MMOL/L
APTT PPP: 30 SECONDS (ref 23–37)
BNP SERPL-MCNC: 25 PG/ML (ref 0–100)
BUN SERPL-MCNC: 20 MG/DL (ref 5–25)
CALCIUM SERPL-MCNC: 9.4 MG/DL (ref 8.4–10.2)
CHLORIDE SERPL-SCNC: 107 MMOL/L (ref 96–108)
CO2 SERPL-SCNC: 28 MMOL/L (ref 21–32)
CREAT SERPL-MCNC: 0.77 MG/DL (ref 0.6–1.3)
ERYTHROCYTE [DISTWIDTH] IN BLOOD BY AUTOMATED COUNT: 15.7 % (ref 11.6–15.1)
GFR SERPL CREATININE-BSD FRML MDRD: 89 ML/MIN/1.73SQ M
GLUCOSE P FAST SERPL-MCNC: 119 MG/DL (ref 65–99)
HCT VFR BLD AUTO: 45.4 % (ref 36.5–49.3)
HGB BLD-MCNC: 14.6 G/DL (ref 12–17)
INR PPP: 1.05 (ref 0.84–1.19)
MAGNESIUM SERPL-MCNC: 2 MG/DL (ref 1.9–2.7)
MCH RBC QN AUTO: 32.9 PG (ref 26.8–34.3)
MCHC RBC AUTO-ENTMCNC: 32.2 G/DL (ref 31.4–37.4)
MCV RBC AUTO: 102 FL (ref 82–98)
PLATELET # BLD AUTO: 215 THOUSANDS/UL (ref 149–390)
PMV BLD AUTO: 9.9 FL (ref 8.9–12.7)
POTASSIUM SERPL-SCNC: 4.3 MMOL/L (ref 3.5–5.3)
PROTHROMBIN TIME: 13.6 SECONDS (ref 11.6–14.5)
RBC # BLD AUTO: 4.44 MILLION/UL (ref 3.88–5.62)
SODIUM SERPL-SCNC: 142 MMOL/L (ref 135–147)
WBC # BLD AUTO: 4.2 THOUSAND/UL (ref 4.31–10.16)

## 2024-02-09 PROCEDURE — 85730 THROMBOPLASTIN TIME PARTIAL: CPT

## 2024-02-09 PROCEDURE — 85027 COMPLETE CBC AUTOMATED: CPT

## 2024-02-09 PROCEDURE — 36415 COLL VENOUS BLD VENIPUNCTURE: CPT

## 2024-02-09 PROCEDURE — 80048 BASIC METABOLIC PNL TOTAL CA: CPT

## 2024-02-09 PROCEDURE — 83735 ASSAY OF MAGNESIUM: CPT

## 2024-02-09 PROCEDURE — 85610 PROTHROMBIN TIME: CPT

## 2024-02-09 PROCEDURE — 83880 ASSAY OF NATRIURETIC PEPTIDE: CPT

## 2024-03-13 DIAGNOSIS — G25.81 RESTLESS LEG: Primary | ICD-10-CM

## 2024-03-13 NOTE — TELEPHONE ENCOUNTER
Benjamin reports Dr. Barahona prescribed in the past- he is requesting Dr. Eagle to refill. Needs to go to [facility].     Reason for call:   [x] Refill   [] Prior Auth  [] Other:     Office:   [x] PCP/Provider - Dr. Eagle   [] Specialty/Provider -     Medication: Ropinirole 3 mg    Dose/Frequency: Benjamin reports he takes 1 tablet daily at bedtime.    Quantity: 90    Pharmacy: [facility]    Does the patient have enough for 3 days?   [] Yes   [x] No - Send as HP to POD

## 2024-03-14 RX ORDER — ROPINIROLE 3 MG/1
3 TABLET, FILM COATED ORAL
Qty: 30 TABLET | Refills: 0 | Status: SHIPPED | OUTPATIENT
Start: 2024-03-14

## 2024-04-22 ENCOUNTER — RA CDI HCC (OUTPATIENT)
Dept: OTHER | Facility: HOSPITAL | Age: 74
End: 2024-04-22

## 2024-04-22 ENCOUNTER — APPOINTMENT (OUTPATIENT)
Dept: LAB | Facility: CLINIC | Age: 74
End: 2024-04-22
Payer: MEDICARE

## 2024-04-22 ENCOUNTER — TRANSCRIBE ORDERS (OUTPATIENT)
Dept: LAB | Facility: CLINIC | Age: 74
End: 2024-04-22

## 2024-04-22 DIAGNOSIS — I10 ESSENTIAL HYPERTENSION, BENIGN: ICD-10-CM

## 2024-04-22 DIAGNOSIS — I48.91 ATRIAL FIBRILLATION, UNSPECIFIED TYPE (HCC): Primary | ICD-10-CM

## 2024-04-22 DIAGNOSIS — Z01.810 PRE-OPERATIVE CARDIOVASCULAR EXAMINATION: ICD-10-CM

## 2024-04-22 LAB
ANION GAP SERPL CALCULATED.3IONS-SCNC: 7 MMOL/L (ref 4–13)
APTT PPP: 30 SECONDS (ref 23–37)
BNP SERPL-MCNC: 57 PG/ML (ref 0–100)
BUN SERPL-MCNC: 26 MG/DL (ref 5–25)
CALCIUM SERPL-MCNC: 9 MG/DL (ref 8.4–10.2)
CHLORIDE SERPL-SCNC: 107 MMOL/L (ref 96–108)
CO2 SERPL-SCNC: 27 MMOL/L (ref 21–32)
CREAT SERPL-MCNC: 0.81 MG/DL (ref 0.6–1.3)
ERYTHROCYTE [DISTWIDTH] IN BLOOD BY AUTOMATED COUNT: 14.9 % (ref 11.6–15.1)
GFR SERPL CREATININE-BSD FRML MDRD: 87 ML/MIN/1.73SQ M
GLUCOSE P FAST SERPL-MCNC: 115 MG/DL (ref 65–99)
HCT VFR BLD AUTO: 46.8 % (ref 36.5–49.3)
HGB BLD-MCNC: 15.2 G/DL (ref 12–17)
INR PPP: 1.18 (ref 0.84–1.19)
MAGNESIUM SERPL-MCNC: 2.2 MG/DL (ref 1.9–2.7)
MCH RBC QN AUTO: 33.8 PG (ref 26.8–34.3)
MCHC RBC AUTO-ENTMCNC: 32.5 G/DL (ref 31.4–37.4)
MCV RBC AUTO: 104 FL (ref 82–98)
PLATELET # BLD AUTO: 210 THOUSANDS/UL (ref 149–390)
PMV BLD AUTO: 9.7 FL (ref 8.9–12.7)
POTASSIUM SERPL-SCNC: 4.5 MMOL/L (ref 3.5–5.3)
PROTHROMBIN TIME: 14.9 SECONDS (ref 11.6–14.5)
RBC # BLD AUTO: 4.5 MILLION/UL (ref 3.88–5.62)
SODIUM SERPL-SCNC: 141 MMOL/L (ref 135–147)
WBC # BLD AUTO: 4.72 THOUSAND/UL (ref 4.31–10.16)

## 2024-04-22 PROCEDURE — 85610 PROTHROMBIN TIME: CPT

## 2024-04-22 PROCEDURE — 83880 ASSAY OF NATRIURETIC PEPTIDE: CPT

## 2024-04-22 PROCEDURE — 80048 BASIC METABOLIC PNL TOTAL CA: CPT

## 2024-04-22 PROCEDURE — 83735 ASSAY OF MAGNESIUM: CPT

## 2024-04-22 PROCEDURE — 36415 COLL VENOUS BLD VENIPUNCTURE: CPT

## 2024-04-22 PROCEDURE — 85730 THROMBOPLASTIN TIME PARTIAL: CPT

## 2024-04-22 PROCEDURE — 85027 COMPLETE CBC AUTOMATED: CPT

## 2024-04-24 ENCOUNTER — TELEPHONE (OUTPATIENT)
Dept: ADMINISTRATIVE | Facility: OTHER | Age: 74
End: 2024-04-24

## 2024-04-24 NOTE — TELEPHONE ENCOUNTER
04/24/24 11:42 AM    Patient contacted (spoke with patient) to bring Advance Directive, POLST, or Living Will document to next scheduled pcp visit.    Thank you.  Jeanine Smith  [facility]

## 2024-04-26 ENCOUNTER — OFFICE VISIT (OUTPATIENT)
Dept: FAMILY MEDICINE CLINIC | Facility: CLINIC | Age: 74
End: 2024-04-26
Payer: MEDICARE

## 2024-04-26 VITALS
RESPIRATION RATE: 16 BRPM | DIASTOLIC BLOOD PRESSURE: 70 MMHG | HEIGHT: 66 IN | SYSTOLIC BLOOD PRESSURE: 148 MMHG | BODY MASS INDEX: 42.56 KG/M2 | HEART RATE: 84 BPM | TEMPERATURE: 98.2 F | OXYGEN SATURATION: 97 % | WEIGHT: 264.8 LBS

## 2024-04-26 DIAGNOSIS — M17.12 PRIMARY OSTEOARTHRITIS OF LEFT KNEE: Primary | ICD-10-CM

## 2024-04-26 DIAGNOSIS — I73.89 OTHER SPECIFIED PERIPHERAL VASCULAR DISEASES (HCC): ICD-10-CM

## 2024-04-26 DIAGNOSIS — I48.91 ATRIAL FIBRILLATION, UNSPECIFIED TYPE (HCC): ICD-10-CM

## 2024-04-26 DIAGNOSIS — R73.09 ABNORMAL GLUCOSE: ICD-10-CM

## 2024-04-26 DIAGNOSIS — C93.Z0: ICD-10-CM

## 2024-04-26 DIAGNOSIS — K43.9 VENTRAL HERNIA WITHOUT OBSTRUCTION OR GANGRENE: ICD-10-CM

## 2024-04-26 DIAGNOSIS — E66.01 CLASS 3 SEVERE OBESITY WITH SERIOUS COMORBIDITY AND BODY MASS INDEX (BMI) OF 40.0 TO 44.9 IN ADULT, UNSPECIFIED OBESITY TYPE (HCC): ICD-10-CM

## 2024-04-26 DIAGNOSIS — I27.20 PULMONARY HYPERTENSION, UNSPECIFIED (HCC): ICD-10-CM

## 2024-04-26 PROCEDURE — 99214 OFFICE O/P EST MOD 30 MIN: CPT | Performed by: FAMILY MEDICINE

## 2024-04-26 PROCEDURE — G2211 COMPLEX E/M VISIT ADD ON: HCPCS | Performed by: FAMILY MEDICINE

## 2024-04-26 RX ORDER — TIRZEPATIDE 2.5 MG/.5ML
2.5 INJECTION, SOLUTION SUBCUTANEOUS WEEKLY
Qty: 2 ML | Refills: 0 | Status: SHIPPED | OUTPATIENT
Start: 2024-04-26 | End: 2024-05-24

## 2024-04-26 RX ORDER — METHOTREXATE 10 MG/1
TABLET, FILM COATED ORAL
COMMUNITY
Start: 2024-02-11

## 2024-04-26 NOTE — PROGRESS NOTES
Benjamin Liu 1950 male MRN: [id number]    [facility] OFFICE VISIT  [facility] - [facility]      ASSESSMENT/PLAN  Benjamin Liu is a 73 y.o. 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  His previous cardiologist and primary care doctor had Wegovy approved unfortunately our prior office team has not been successful.  I want to send in a new prescription in order to help aid this.  Patient has significant left knee osteoarthritis pending surgery only with weight loss.  Patient is limited with exercise secondary to the pain and core mobilities.  Patient has an upcoming ablation to be performed on his heart  Discussed the pulmonary hypertension was on his chart prior to us.  Did advise the patient that his cardiologist is aware and has been following this.  History of peripheral vascular disease currently stable.  White blood cell count has been stable.  Ventral hernia has increased slightly in size recommend that if it becomes painful the patient be evaluated by general surgery.  Patient agrees with my plan.  If it seems that it is increasing in size at her October visit we will send him for general surgery evaluation regardless             Future Appointments   Date Time Provider Department Center   10/28/2024  7:30 AM Jayla Weir MD [facility]          SUBJECTIVE  CC: Follow-up (6 month follow up , check hernia in abdominal area he think it got bigger)      HPI:  Benjamin Liu is a 73 y.o. male who presents for follow up appointment.  Patient was post to have an ablation in November but unfortunately was discontinued given that there was a blood clot when patient held his Eliquis for 5 days prior patient will be going in for surgery on April 29 for possible ablation and is holding his Eliquis the day of to avoid blood clot recurrence.  Overall patient states that he is very limited on weight loss given that he cannot walk secondary to his left knee pain.  Patient states that he is trying to watch his diet.  Unfortunately he states that he has not been approved for the weight loss medication since being in our office.  Review of Systems   Constitutional:  Negative for activity change, appetite change, chills, fatigue and fever.   HENT:  Negative for congestion.    Respiratory:  Negative for cough, chest tightness and shortness of breath.    Cardiovascular:  Negative for chest pain and leg swelling.   Gastrointestinal:  Negative for abdominal distention, abdominal pain, constipation, diarrhea, nausea and vomiting.   Musculoskeletal:  Positive for arthralgias and back pain.   All other systems reviewed and are negative.      Historical Information   The patient history was reviewed as follows:  Past Medical History:   Diagnosis Date   • Cardiac disease     atrial fib,pacemaker   • Hyperlipidemia    • Hypertension          Medications:     Current Outpatient Medications:   •  alfuzosin (UROXATRAL) 10 mg 24 hr tablet, Take 10 mg by mouth daily, Disp: , Rfl:   •  amiodarone 200 mg tablet, , Disp: , Rfl:   •  apixaban (ELIQUIS) 5 mg, Take 5 mg by mouth 2 (two) times a day, Disp: , Rfl:   •  diltiazem (CARDIZEM CD) 120 mg 24 hr capsule, , Disp: , Rfl:   •  folic acid (FOLVITE) 1 mg tablet, , Disp: , Rfl:   •  furosemide (LASIX) 40 mg tablet, Take 1  "tablet (40 mg total) by mouth daily, Disp: 90 tablet, Rfl: 3  •  metoprolol tartrate (LOPRESSOR) 50 mg tablet, Take 0.5 tablets (25 mg total) by mouth every 12 (twelve) hours, Disp: 90 tablet, Rfl: 3  •  Multiple Vitamin (MULTIVITAMIN ADULT PO), Take by mouth, Disp: , Rfl:   •  Multiple Vitamins-Minerals (PRESERVISION AREDS PO), Take by mouth, Disp: , Rfl:   •  omeprazole (PriLOSEC) 40 MG capsule, Take 1 capsule (40 mg total) by mouth daily, Disp: 90 capsule, Rfl: 1  •  prednisoLONE acetate (PRED FORTE) 1 % ophthalmic suspension, , Disp: , Rfl:   •  rOPINIRole (REQUIP) 3 mg tablet, Take 1 tablet (3 mg total) by mouth daily at bedtime, Disp: 30 tablet, Rfl: 0  •  simvastatin (ZOCOR) 40 mg tablet, Take 40 mg by mouth daily at bedtime, Disp: , Rfl:   •  tirzepatide (Zepbound) 2.5 mg/0.5 mL auto-injector, Inject 0.5 mL (2.5 mg total) under the skin once a week for 28 days, Disp: 2 mL, Rfl: 0  •  Trexall 10 MG tablet, , Disp: , Rfl:     Allergies   Allergen Reactions   • Penicillins        OBJECTIVE  Vitals:   Vitals:    04/26/24 0732   BP: 148/70   BP Location: Left arm   Patient Position: Sitting   Cuff Size: Large   Pulse: 84   Resp: 16   Temp: 98.2 °F (36.8 °C)   SpO2: 97%   Weight: 120 kg (264 lb 12.8 oz)   Height: 5' 6\" (1.676 m)         Physical Exam  Vitals reviewed.   Constitutional:       Appearance: He is well-developed.   HENT:      Head: Normocephalic and atraumatic.   Eyes:      Conjunctiva/sclera: Conjunctivae normal.      Pupils: Pupils are equal, round, and reactive to light.   Cardiovascular:      Rate and Rhythm: Normal rate and regular rhythm.      Heart sounds: Normal heart sounds, S1 normal and S2 normal. No murmur heard.     Comments: Currently not in A-fib  Pulmonary:      Effort: Pulmonary effort is normal. No respiratory distress.      Breath sounds: Normal breath sounds. No wheezing.   Abdominal:      Hernia: A hernia is present.      Comments: Ventral hernia easily reducible nontender today "   Musculoskeletal:         General: Tenderness present. Normal range of motion.      Cervical back: Normal range of motion and neck supple.      Comments: With range of motion of the left knee   Skin:     General: Skin is warm.   Neurological:      Mental Status: He is alert and oriented to person, place, and time.   Psychiatric:         Speech: Speech normal.         Behavior: Behavior normal.         Thought Content: Thought content normal.         Judgment: Judgment normal.                    Jayla Eagle MD,   [facility]  4/26/2024

## 2024-04-30 ENCOUNTER — TELEPHONE (OUTPATIENT)
Age: 74
End: 2024-04-30

## 2024-04-30 NOTE — TELEPHONE ENCOUNTER
Received a PA for zepbound -need a diagnosis  for obesity-could you add this to your last note-insurance request for this obesity for approval.please send back to the PA TEAM FOR INITIATION

## 2024-05-01 NOTE — TELEPHONE ENCOUNTER
PA for Zepbound    Submitted via    []CMM-KEY   [x][employer]-Case ID # [id number]  []Faxed to plan   []Other website   []Phone call Case ID #     Office notes sent, clinical questions answered. Awaiting determination    Turnaround time for your insurance to make a decision on your Prior Authorization can take 7-21 business days.

## 2024-05-06 NOTE — TELEPHONE ENCOUNTER
PA for Zepbound appealed via     []CMM  []SS  [x]Letter sent to insurance via fax [phone]  []Other site or means     All necessary records sent. Will await response from insurance company    Turnaround time for a decision to be made on an appeal could take up to 30 business days

## 2024-05-20 DIAGNOSIS — K21.9 GASTROESOPHAGEAL REFLUX DISEASE WITHOUT ESOPHAGITIS: ICD-10-CM

## 2024-05-21 DIAGNOSIS — G25.81 RESTLESS LEG: ICD-10-CM

## 2024-05-21 RX ORDER — OMEPRAZOLE 40 MG/1
40 CAPSULE, DELAYED RELEASE ORAL DAILY
Qty: 90 CAPSULE | Refills: 1 | Status: SHIPPED | OUTPATIENT
Start: 2024-05-21

## 2024-05-22 RX ORDER — ROPINIROLE 3 MG/1
3 TABLET, FILM COATED ORAL
Qty: 30 TABLET | Refills: 0 | Status: SHIPPED | OUTPATIENT
Start: 2024-05-22

## 2024-05-22 NOTE — TELEPHONE ENCOUNTER
Patient needed this prescription to go to [facility] with a 90 day supply.  It was sent to [facility].

## 2024-06-10 ENCOUNTER — TELEPHONE (OUTPATIENT)
Dept: FAMILY MEDICINE CLINIC | Facility: CLINIC | Age: 74
End: 2024-06-10

## 2024-06-10 DIAGNOSIS — R73.09 ABNORMAL GLUCOSE: ICD-10-CM

## 2024-06-10 DIAGNOSIS — E66.01 CLASS 3 SEVERE OBESITY WITH SERIOUS COMORBIDITY AND BODY MASS INDEX (BMI) OF 40.0 TO 44.9 IN ADULT, UNSPECIFIED OBESITY TYPE (HCC): ICD-10-CM

## 2024-06-10 DIAGNOSIS — I48.91 ATRIAL FIBRILLATION, UNSPECIFIED TYPE (HCC): Primary | ICD-10-CM

## 2024-06-10 RX ORDER — SEMAGLUTIDE 0.25 MG/.5ML
INJECTION, SOLUTION SUBCUTANEOUS
Qty: 2 ML | Refills: 0 | Status: SHIPPED | OUTPATIENT
Start: 2024-06-10 | End: 2024-06-17 | Stop reason: SDUPTHER

## 2024-06-10 NOTE — TELEPHONE ENCOUNTER
[facility] is [facility]  He was told by pharmacy that they are waiting on an authorization but I don't see that the medication was ordered.

## 2024-06-17 DIAGNOSIS — R73.09 ABNORMAL GLUCOSE: ICD-10-CM

## 2024-06-17 DIAGNOSIS — E66.01 CLASS 3 SEVERE OBESITY WITH SERIOUS COMORBIDITY AND BODY MASS INDEX (BMI) OF 40.0 TO 44.9 IN ADULT, UNSPECIFIED OBESITY TYPE (HCC): ICD-10-CM

## 2024-06-17 DIAGNOSIS — I48.91 ATRIAL FIBRILLATION, UNSPECIFIED TYPE (HCC): ICD-10-CM

## 2024-06-17 RX ORDER — SEMAGLUTIDE 0.25 MG/.5ML
INJECTION, SOLUTION SUBCUTANEOUS
Qty: 2 ML | Refills: 0 | Status: SHIPPED | OUTPATIENT
Start: 2024-06-17

## 2024-07-01 ENCOUNTER — APPOINTMENT (OUTPATIENT)
Dept: LAB | Facility: CLINIC | Age: 74
End: 2024-07-01
Payer: MEDICARE

## 2024-07-01 DIAGNOSIS — I10 ESSENTIAL HYPERTENSION, MALIGNANT: ICD-10-CM

## 2024-07-01 DIAGNOSIS — I48.91 ATRIAL FIBRILLATION, UNSPECIFIED TYPE (HCC): ICD-10-CM

## 2024-07-01 LAB
APTT PPP: 31 SECONDS (ref 23–37)
BNP SERPL-MCNC: 31 PG/ML (ref 0–100)

## 2024-07-01 PROCEDURE — 83880 ASSAY OF NATRIURETIC PEPTIDE: CPT

## 2024-07-01 PROCEDURE — 85730 THROMBOPLASTIN TIME PARTIAL: CPT

## 2024-07-08 DIAGNOSIS — G25.81 RESTLESS LEG: ICD-10-CM

## 2024-07-08 RX ORDER — ROPINIROLE 3 MG/1
3 TABLET, FILM COATED ORAL
Qty: 90 TABLET | Refills: 1 | Status: SHIPPED | OUTPATIENT
Start: 2024-07-08

## 2024-09-09 ENCOUNTER — OFFICE VISIT (OUTPATIENT)
Dept: SURGERY | Facility: CLINIC | Age: 74
End: 2024-09-09
Payer: MEDICARE

## 2024-09-09 VITALS
SYSTOLIC BLOOD PRESSURE: 130 MMHG | TEMPERATURE: 96.7 F | BODY MASS INDEX: 42.46 KG/M2 | DIASTOLIC BLOOD PRESSURE: 69 MMHG | WEIGHT: 264.2 LBS | HEIGHT: 66 IN | HEART RATE: 82 BPM

## 2024-09-09 DIAGNOSIS — M62.08 RECTUS DIASTASIS: Primary | ICD-10-CM

## 2024-09-09 DIAGNOSIS — K42.9 UMBILICAL HERNIA: ICD-10-CM

## 2024-09-09 PROCEDURE — 99203 OFFICE O/P NEW LOW 30 MIN: CPT | Performed by: SURGERY

## 2024-09-09 NOTE — PROGRESS NOTES
"[facility] History and Physical Note:    Assessment:  Rectus diastases and small umbilical defect that is freely reducible  Plan:  I explained to the patient is freely reducible umbilical hernia poses very little risk to him and does not need repair.  I also explained that no attempt at repair is possible until he conducts behavior modification for continued weight loss with goal weight 205 pounds    Chief Complaint:  \"I am here for the hernia\"    HPI    Patient is a 74-year-old gentleman with lower extremity edema, hypertension, A-fib on Eliquis (recent ablation July 2024) and morbid obesity referred to my office for his umbilical hernia.  He states has been there for quite some time and recently enlarging.    PMH:  Past Medical History:   Diagnosis Date    Cardiac disease     atrial fib,pacemaker    Hyperlipidemia     Hypertension        PSH:  Past Surgical History:   Procedure Laterality Date    FL INJECTION LEFT KNEE (ARTHROGRAM)  12/6/2022       Home Meds:  Current Outpatient Medications on File Prior to Visit   Medication Sig Dispense Refill    alfuzosin (UROXATRAL) 10 mg 24 hr tablet Take 10 mg by mouth daily      amiodarone 200 mg tablet       apixaban (ELIQUIS) 5 mg Take 5 mg by mouth 2 (two) times a day      diltiazem (CARDIZEM CD) 120 mg 24 hr capsule       folic acid (FOLVITE) 1 mg tablet       furosemide (LASIX) 40 mg tablet Take 1 tablet (40 mg total) by mouth daily 90 tablet 3    metoprolol tartrate (LOPRESSOR) 50 mg tablet Take 0.5 tablets (25 mg total) by mouth every 12 (twelve) hours 90 tablet 3    Multiple Vitamin (MULTIVITAMIN ADULT PO) Take by mouth      Multiple Vitamins-Minerals (PRESERVISION AREDS PO) Take by mouth      omeprazole (PriLOSEC) 40 MG capsule TAKE 1 CAPSULE DAILY 90 capsule 1    prednisoLONE acetate (PRED FORTE) 1 % ophthalmic suspension       rOPINIRole (REQUIP) 3 mg tablet Take 1 tablet (3 mg total) by mouth daily at bedtime 90 tablet 1    " Semaglutide-Weight Management (Wegovy) 0.25 MG/0.5ML Inject 0.25 mg under the skin weekly 2 mL 0    simvastatin (ZOCOR) 40 mg tablet Take 40 mg by mouth daily at bedtime      Trexall 10 MG tablet        No current facility-administered medications on file prior to visit.       Allergies:  Allergies   Allergen Reactions    Penicillins        Social Hx:  Social History     Socioeconomic History    Marital status:      Spouse name: Not on file    Number of children: Not on file    Years of education: Not on file    Highest education level: Not on file   Occupational History    Not on file   Tobacco Use    Smoking status: Former     Current packs/day: 0.00     Types: Cigarettes     Start date: 7/15/1965     Quit date: 3/10/2010     Years since quittin.5    Smokeless tobacco: Never   Vaping Use    Vaping status: Never Used   Substance and Sexual Activity    Alcohol use: Yes     Comment: occas    Drug use: Yes    Sexual activity: Not on file   Other Topics Concern    Not on file   Social History Narrative    Not on file     Social Determinants of Health     Financial Resource Strain: Low Risk  (10/25/2023)    Overall Financial Resource Strain (CARDIA)     Difficulty of Paying Living Expenses: Not very hard   Food Insecurity: Low Risk  (2024)    Received from [facility]    Food Insecurity     Within the past 12 months, the food you bought just didn’t last and you didn’t have money to get more.: Never true     Within the past 12 months, you worried that your food would run out before you got money to buy more.: Never true   Transportation Needs: No Transportation Needs (10/25/2023)    PRAPARE - Transportation     Lack of Transportation (Medical): No     Lack of Transportation (Non-Medical): No   Physical Activity: Not on file   Stress: Not on file   Social Connections: Not on file   Intimate Partner Violence: Not on file   Housing Stability: Low Risk  (2024)    Received from [facility]     Housing Stability     Are you worried or concerned that in the next two months you may not have stable housing that you own, rent, or stay in as a part of a household?: No     Think about the place you live. Do you have problems with any of the following? (check all that apply): None of the above        Family Hx:    No family history on file.      Review of Systems   Constitutional:  Negative for chills and fever.   Respiratory: Negative.     Cardiovascular: Negative.         See HPI past medical history   Gastrointestinal: Negative.    Genitourinary: Negative.    Musculoskeletal:         Bilateral lower extremity swelling   Neurological: Negative.    Hematological:         On Eliquis   All other systems reviewed and are negative.      There were no vitals taken for this visit.    Physical Exam  Vitals reviewed.   Constitutional:       General: He is not in acute distress.     Appearance: He is obese.   Cardiovascular:      Rate and Rhythm: Normal rate and regular rhythm.   Pulmonary:      Effort: Pulmonary effort is normal. No respiratory distress.      Breath sounds: Normal breath sounds.   Abdominal:      Comments: Obese, soft, nondistended.  Patient has an approximately 3 cm fascial defect at the umbilicus.  Most of his physical exam indicates a upper abdominal rectus diastases   Musculoskeletal:         General: Normal range of motion.   Skin:     General: Skin is warm and dry.   Neurological:      General: No focal deficit present.      Mental Status: He is alert.         Pertinent labs reviewed    Pertinent images and available reads personally reviewed    Pertinent notes reviewed       Ashu Kearney MD FACS  [facility]  [phone]

## 2024-10-21 DIAGNOSIS — R73.09 ABNORMAL GLUCOSE: ICD-10-CM

## 2024-10-21 RX ORDER — FUROSEMIDE 40 MG/1
40 TABLET ORAL DAILY
Qty: 90 TABLET | Refills: 1 | Status: SHIPPED | OUTPATIENT
Start: 2024-10-21

## 2024-10-28 ENCOUNTER — APPOINTMENT (OUTPATIENT)
Dept: LAB | Facility: CLINIC | Age: 74
End: 2024-10-28
Payer: MEDICARE

## 2024-10-28 ENCOUNTER — TELEPHONE (OUTPATIENT)
Age: 74
End: 2024-10-28

## 2024-10-28 ENCOUNTER — OFFICE VISIT (OUTPATIENT)
Dept: FAMILY MEDICINE CLINIC | Facility: CLINIC | Age: 74
End: 2024-10-28
Payer: MEDICARE

## 2024-10-28 VITALS
OXYGEN SATURATION: 97 % | HEIGHT: 69 IN | TEMPERATURE: 98.2 F | BODY MASS INDEX: 38.92 KG/M2 | WEIGHT: 262.8 LBS | DIASTOLIC BLOOD PRESSURE: 72 MMHG | SYSTOLIC BLOOD PRESSURE: 122 MMHG | RESPIRATION RATE: 16 BRPM | HEART RATE: 91 BPM

## 2024-10-28 DIAGNOSIS — Z00.00 MEDICARE ANNUAL WELLNESS VISIT, SUBSEQUENT: Primary | ICD-10-CM

## 2024-10-28 DIAGNOSIS — Z12.5 SCREENING PSA (PROSTATE SPECIFIC ANTIGEN): ICD-10-CM

## 2024-10-28 DIAGNOSIS — E66.813 CLASS 3 SEVERE OBESITY WITH SERIOUS COMORBIDITY AND BODY MASS INDEX (BMI) OF 40.0 TO 44.9 IN ADULT, UNSPECIFIED OBESITY TYPE (HCC): ICD-10-CM

## 2024-10-28 DIAGNOSIS — Z11.59 NEED FOR HEPATITIS C SCREENING TEST: ICD-10-CM

## 2024-10-28 DIAGNOSIS — Z13.220 SCREENING CHOLESTEROL LEVEL: ICD-10-CM

## 2024-10-28 DIAGNOSIS — Z13.29 SCREENING FOR THYROID DISORDER: ICD-10-CM

## 2024-10-28 DIAGNOSIS — E78.2 MIXED HYPERLIPIDEMIA: ICD-10-CM

## 2024-10-28 DIAGNOSIS — K21.9 GASTROESOPHAGEAL REFLUX DISEASE WITHOUT ESOPHAGITIS: ICD-10-CM

## 2024-10-28 DIAGNOSIS — I48.91 ATRIAL FIBRILLATION, UNSPECIFIED TYPE (HCC): ICD-10-CM

## 2024-10-28 DIAGNOSIS — E66.01 CLASS 3 SEVERE OBESITY WITH SERIOUS COMORBIDITY AND BODY MASS INDEX (BMI) OF 40.0 TO 44.9 IN ADULT, UNSPECIFIED OBESITY TYPE (HCC): ICD-10-CM

## 2024-10-28 DIAGNOSIS — R73.09 ABNORMAL GLUCOSE: ICD-10-CM

## 2024-10-28 LAB
ALBUMIN SERPL BCG-MCNC: 4.5 G/DL (ref 3.5–5)
ALP SERPL-CCNC: 83 U/L (ref 34–104)
ALT SERPL W P-5'-P-CCNC: 26 U/L (ref 7–52)
ANION GAP SERPL CALCULATED.3IONS-SCNC: 11 MMOL/L (ref 4–13)
AST SERPL W P-5'-P-CCNC: 21 U/L (ref 13–39)
BASOPHILS # BLD AUTO: 0.01 THOUSANDS/ΜL (ref 0–0.1)
BASOPHILS NFR BLD AUTO: 0 % (ref 0–1)
BILIRUB SERPL-MCNC: 0.51 MG/DL (ref 0.2–1)
BUN SERPL-MCNC: 23 MG/DL (ref 5–25)
CALCIUM SERPL-MCNC: 9 MG/DL (ref 8.4–10.2)
CHLORIDE SERPL-SCNC: 106 MMOL/L (ref 96–108)
CHOLEST SERPL-MCNC: 146 MG/DL
CO2 SERPL-SCNC: 25 MMOL/L (ref 21–32)
CREAT SERPL-MCNC: 0.73 MG/DL (ref 0.6–1.3)
EOSINOPHIL # BLD AUTO: 0.05 THOUSAND/ΜL (ref 0–0.61)
EOSINOPHIL NFR BLD AUTO: 1 % (ref 0–6)
ERYTHROCYTE [DISTWIDTH] IN BLOOD BY AUTOMATED COUNT: 15.4 % (ref 11.6–15.1)
GFR SERPL CREATININE-BSD FRML MDRD: 90 ML/MIN/1.73SQ M
GLUCOSE P FAST SERPL-MCNC: 107 MG/DL (ref 65–99)
HCT VFR BLD AUTO: 45 % (ref 36.5–49.3)
HCV AB SER QL: NORMAL
HDLC SERPL-MCNC: 34 MG/DL
HGB BLD-MCNC: 15 G/DL (ref 12–17)
IMM GRANULOCYTES # BLD AUTO: 0.03 THOUSAND/UL (ref 0–0.2)
IMM GRANULOCYTES NFR BLD AUTO: 1 % (ref 0–2)
LDLC SERPL CALC-MCNC: 76 MG/DL (ref 0–100)
LYMPHOCYTES # BLD AUTO: 0.57 THOUSANDS/ΜL (ref 0.6–4.47)
LYMPHOCYTES NFR BLD AUTO: 13 % (ref 14–44)
MCH RBC QN AUTO: 33.9 PG (ref 26.8–34.3)
MCHC RBC AUTO-ENTMCNC: 33.3 G/DL (ref 31.4–37.4)
MCV RBC AUTO: 102 FL (ref 82–98)
MONOCYTES # BLD AUTO: 0.54 THOUSAND/ΜL (ref 0.17–1.22)
MONOCYTES NFR BLD AUTO: 12 % (ref 4–12)
NEUTROPHILS # BLD AUTO: 3.36 THOUSANDS/ΜL (ref 1.85–7.62)
NEUTS SEG NFR BLD AUTO: 73 % (ref 43–75)
NONHDLC SERPL-MCNC: 112 MG/DL
NRBC BLD AUTO-RTO: 0 /100 WBCS
PLATELET # BLD AUTO: 219 THOUSANDS/UL (ref 149–390)
PMV BLD AUTO: 9.8 FL (ref 8.9–12.7)
POTASSIUM SERPL-SCNC: 4.1 MMOL/L (ref 3.5–5.3)
PROT SERPL-MCNC: 7 G/DL (ref 6.4–8.4)
PSA SERPL-MCNC: 0.1 NG/ML (ref 0–4)
RBC # BLD AUTO: 4.42 MILLION/UL (ref 3.88–5.62)
SODIUM SERPL-SCNC: 142 MMOL/L (ref 135–147)
TRIGL SERPL-MCNC: 181 MG/DL
TSH SERPL DL<=0.05 MIU/L-ACNC: 0.54 UIU/ML (ref 0.45–4.5)
WBC # BLD AUTO: 4.56 THOUSAND/UL (ref 4.31–10.16)

## 2024-10-28 PROCEDURE — 80053 COMPREHEN METABOLIC PANEL: CPT

## 2024-10-28 PROCEDURE — 84443 ASSAY THYROID STIM HORMONE: CPT

## 2024-10-28 PROCEDURE — G0103 PSA SCREENING: HCPCS

## 2024-10-28 PROCEDURE — 36415 COLL VENOUS BLD VENIPUNCTURE: CPT

## 2024-10-28 PROCEDURE — 80061 LIPID PANEL: CPT

## 2024-10-28 PROCEDURE — 85025 COMPLETE CBC W/AUTO DIFF WBC: CPT

## 2024-10-28 PROCEDURE — 86803 HEPATITIS C AB TEST: CPT

## 2024-10-28 PROCEDURE — G0439 PPPS, SUBSEQ VISIT: HCPCS | Performed by: FAMILY MEDICINE

## 2024-10-28 RX ORDER — AMIODARONE HYDROCHLORIDE 200 MG/1
200 TABLET ORAL DAILY
Start: 2024-10-28

## 2024-10-28 RX ORDER — SEMAGLUTIDE 0.25 MG/.5ML
INJECTION, SOLUTION SUBCUTANEOUS
Qty: 2 ML | Refills: 0 | Status: SHIPPED | OUTPATIENT
Start: 2024-10-28

## 2024-10-28 RX ORDER — OMEPRAZOLE 40 MG/1
40 CAPSULE, DELAYED RELEASE ORAL DAILY
Qty: 90 CAPSULE | Refills: 1 | Status: SHIPPED | OUTPATIENT
Start: 2024-10-28

## 2024-10-28 RX ORDER — TAMSULOSIN HYDROCHLORIDE 0.4 MG/1
0.4 CAPSULE ORAL
COMMUNITY
Start: 2024-09-06

## 2024-10-28 RX ORDER — FUROSEMIDE 40 MG/1
40 TABLET ORAL 2 TIMES DAILY
Qty: 180 TABLET | Refills: 1 | Status: SHIPPED | OUTPATIENT
Start: 2024-10-28

## 2024-10-28 RX ORDER — FUROSEMIDE 40 MG/1
40 TABLET ORAL DAILY
Qty: 90 TABLET | Refills: 1 | Status: SHIPPED | OUTPATIENT
Start: 2024-10-28 | End: 2024-10-28 | Stop reason: SDUPTHER

## 2024-10-28 RX ORDER — METOPROLOL TARTRATE 50 MG
25 TABLET ORAL EVERY 12 HOURS SCHEDULED
Qty: 90 TABLET | Refills: 3 | Status: SHIPPED | OUTPATIENT
Start: 2024-10-28

## 2024-10-28 NOTE — TELEPHONE ENCOUNTER
CARLOS EDUARDO Garciavy 0.25 MG/0.5ML SUBMITTED     via    []CMM-KEY:    [x][employer]-Case ID # [id number]   [][employer]-Auth ID #  [facility] #    []Faxed to plan   []Other website    []Phone call Case ID #      Office notes sent, clinical questions answered. Awaiting determination    Turnaround time for your insurance to make a decision on your Prior Authorization can take 7-21 business days.

## 2024-10-28 NOTE — PATIENT INSTRUCTIONS
Medicare Preventive Visit Patient Instructions  Thank you for completing your Welcome to Medicare Visit or Medicare Annual Wellness Visit today. Your next wellness visit will be due in one year (10/29/2025).  The screening/preventive services that you may require over the next 5-10 years are detailed below. Some tests may not apply to you based off risk factors and/or age. Screening tests ordered at today's visit but not completed yet may show as past due. Also, please note that scanned in results may not display below.  Preventive Screenings:  Service Recommendations Previous Testing/Comments   Colorectal Cancer Screening  Colonoscopy    Fecal Occult Blood Test (FOBT)/Fecal Immunochemical Test (FIT)  Fecal DNA/Cologuard Test  Flexible Sigmoidoscopy Age: 45-75 years old   Colonoscopy: every 10 years (May be performed more frequently if at higher risk)  OR  FOBT/FIT: every 1 year  OR  Cologuard: every 3 years  OR  Sigmoidoscopy: every 5 years  Screening may be recommended earlier than age 45 if at higher risk for colorectal cancer. Also, an individualized decision between you and your healthcare provider will decide whether screening between the ages of 76-85 would be appropriate. Colonoscopy: 04/07/2021  FOBT/FIT: Not on file  Cologuard: Not on file  Sigmoidoscopy: Not on file          Prostate Cancer Screening Individualized decision between patient and health care provider in men between ages of 55-69   Medicare will cover every 12 months beginning on the day after your 50th birthday PSA: No results in last 5 years           Hepatitis C Screening Once for adults born between 1945 and 1965  More frequently in patients at high risk for Hepatitis C Hep C Antibody: Not on file        Diabetes Screening 1-2 times per year if you're at risk for diabetes or have pre-diabetes Fasting glucose: 109 mg/dL (7/1/2024)  A1C: 5.4 % (10/23/2023)      Cholesterol Screening Once every 5 years if you don't have a lipid disorder. May  order more often based on risk factors. Lipid panel: 10/11/2023         Other Preventive Screenings Covered by Medicare:  Abdominal Aortic Aneurysm (AAA) Screening: covered once if your at risk. You're considered to be at risk if you have a family history of AAA or a male between the age of 65-75 who smoking at least 100 cigarettes in your lifetime.  Lung Cancer Screening: covers low dose CT scan once per year if you meet all of the following conditions: (1) Age 55-77; (2) No signs or symptoms of lung cancer; (3) Current smoker or have quit smoking within the last 15 years; (4) You have a tobacco smoking history of at least 20 pack years (packs per day x number of years you smoked); (5) You get a written order from a healthcare provider.  Glaucoma Screening: covered annually if you're considered high risk: (1) You have diabetes OR (2) Family history of glaucoma OR (3)  aged 50 and older OR (4)  American aged 65 and older  Osteoporosis Screening: covered every 2 years if you meet one of the following conditions: (1) Have a vertebral abnormality; (2) On glucocorticoid therapy for more than 3 months; (3) Have primary hyperparathyroidism; (4) On osteoporosis medications and need to assess response to drug therapy.  HIV Screening: covered annually if you're between the age of 15-65. Also covered annually if you are younger than 15 and older than 65 with risk factors for HIV infection. For pregnant patients, it is covered up to 3 times per pregnancy.    Immunizations:  Immunization Recommendations   Influenza Vaccine Annual influenza vaccination during flu season is recommended for all persons aged >= 6 months who do not have contraindications   Pneumococcal Vaccine   * Pneumococcal conjugate vaccine = PCV13 (Prevnar 13), PCV15 (Vaxneuvance), PCV20 (Prevnar 20)  * Pneumococcal polysaccharide vaccine = PPSV23 (Pneumovax) Adults 19-65 yo with certain risk factors or if 65+ yo  If never received any  pneumonia vaccine: recommend Prevnar 20 (PCV20)  Give PCV20 if previously received 1 dose of PCV13 or PPSV23   Hepatitis B Vaccine 3 dose series if at intermediate or high risk (ex: diabetes, end stage renal disease, liver disease)   Respiratory syncytial virus (RSV) Vaccine - COVERED BY MEDICARE PART D  * RSVPreF3 (Arexvy) CDC recommends that adults 60 years of age and older may receive a single dose of RSV vaccine using shared clinical decision-making (SCDM)   Tetanus (Td) Vaccine - COST NOT COVERED BY MEDICARE PART B Following completion of primary series, a booster dose should be given every 10 years to maintain immunity against tetanus. Td may also be given as tetanus wound prophylaxis.   Tdap Vaccine - COST NOT COVERED BY MEDICARE PART B Recommended at least once for all adults. For pregnant patients, recommended with each pregnancy.   Shingles Vaccine (Shingrix) - COST NOT COVERED BY MEDICARE PART B  2 shot series recommended in those 19 years and older who have or will have weakened immune systems or those 50 years and older     Health Maintenance Due:      Topic Date Due   • Hepatitis C Screening  Never done   • Colorectal Cancer Screening  04/07/2026     Immunizations Due:      Topic Date Due   • Pneumococcal Vaccine: 65+ Years (1 of 2 - PCV) Never done     Advance Directives   What are advance directives?  Advance directives are legal documents that state your wishes and plans for medical care. These plans are made ahead of time in case you lose your ability to make decisions for yourself. Advance directives can apply to any medical decision, such as the treatments you want, and if you want to donate organs.   What are the types of advance directives?  There are many types of advance directives, and each state has rules about how to use them. You may choose a combination of any of the following:  Living will:  This is a written record of the treatment you want. You can also choose which treatments you do  not want, which to limit, and which to stop at a certain time. This includes surgery, medicine, IV fluid, and tube feedings.   Durable power of  for healthcare (DPAHC):  This is a written record that states who you want to make healthcare choices for you when you are unable to make them for yourself. This person, called a proxy, is usually a family member or a friend. You may choose more than 1 proxy.  Do not resuscitate (DNR) order:  A DNR order is used in case your heart stops beating or you stop breathing. It is a request not to have certain forms of treatment, such as CPR. A DNR order may be included in other types of advance directives.  Medical directive:  This covers the care that you want if you are in a coma, near death, or unable to make decisions for yourself. You can list the treatments you want for each condition. Treatment may include pain medicine, surgery, blood transfusions, dialysis, IV or tube feedings, and a ventilator (breathing machine).  Values history:  This document has questions about your views, beliefs, and how you feel and think about life. This information can help others choose the care that you would choose.  Why are advance directives important?  An advance directive helps you control your care. Although spoken wishes may be used, it is better to have your wishes written down. Spoken wishes can be misunderstood, or not followed. Treatments may be given even if you do not want them. An advance directive may make it easier for your family to make difficult choices about your care.   Weight Management   Why it is important to manage your weight:  Being overweight increases your risk of health conditions such as heart disease, high blood pressure, type 2 diabetes, and certain types of cancer. It can also increase your risk for osteoarthritis, sleep apnea, and other respiratory problems. Aim for a slow, steady weight loss. Even a small amount of weight loss can lower your risk of  health problems.  How to lose weight safely:  A safe and healthy way to lose weight is to eat fewer calories and get regular exercise. You can lose up about 1 pound a week by decreasing the number of calories you eat by 500 calories each day.   Healthy meal plan for weight management:  A healthy meal plan includes a variety of foods, contains fewer calories, and helps you stay healthy. A healthy meal plan includes the following:  Eat whole-grain foods more often.  A healthy meal plan should contain fiber. Fiber is the part of grains, fruits, and vegetables that is not broken down by your body. Whole-grain foods are healthy and provide extra fiber in your diet. Some examples of whole-grain foods are whole-wheat breads and pastas, oatmeal, brown rice, and bulgur.  Eat a variety of vegetables every day.  Include dark, leafy greens such as spinach, kale, wily greens, and mustard greens. Eat yellow and orange vegetables such as carrots, sweet potatoes, and winter squash.   Eat a variety of fruits every day.  Choose fresh or canned fruit (canned in its own juice or light syrup) instead of juice. Fruit juice has very little or no fiber.  Eat low-fat dairy foods.  Drink fat-free (skim) milk or 1% milk. Eat fat-free yogurt and low-fat cottage cheese. Try low-fat cheeses such as mozzarella and other reduced-fat cheeses.  Choose meat and other protein foods that are low in fat.  Choose beans or other legumes such as split peas or lentils. Choose fish, skinless poultry (chicken or turkey), or lean cuts of red meat (beef or pork). Before you cook meat or poultry, cut off any visible fat.   Use less fat and oil.  Try baking foods instead of frying them. Add less fat, such as margarine, sour cream, regular salad dressing and mayonnaise to foods. Eat fewer high-fat foods. Some examples of high-fat foods include french fries, doughnuts, ice cream, and cakes.  Eat fewer sweets.  Limit foods and drinks that are high in sugar. This  includes candy, cookies, regular soda, and sweetened drinks.  Exercise:  Exercise at least 30 minutes per day on most days of the week. Some examples of exercise include walking, biking, dancing, and swimming. You can also fit in more physical activity by taking the stairs instead of the elevator or parking farther away from stores. Ask your healthcare provider about the best exercise plan for you.      © Copyright [employer] 2018 Information is for End User's use only and may not be sold, redistributed or otherwise used for commercial purposes. All illustrations and images included in CareNotes® are the copyrighted property of [employer] Inc. or [employer]

## 2024-10-28 NOTE — ASSESSMENT & PLAN NOTE
Orders:    Semaglutide-Weight Management (Wegovy) 0.25 MG/0.5ML; Inject 0.25 mg under the skin weekly    amiodarone 200 mg tablet; Take 1 tablet (200 mg total) by mouth daily    CBC and differential; Future    Comprehensive metabolic panel; Future    Lipid panel; Future

## 2024-10-28 NOTE — PROGRESS NOTES
Ambulatory Visit  Name: Benjamin Liu      : 1950      MRN: [id number]  Encounter Provider: Jayla Weir MD  Encounter Date: 10/28/2024   Encounter department: [facility]    Assessment & Plan  Medicare annual wellness visit, subsequent         Abnormal glucose    Orders:    metoprolol tartrate (LOPRESSOR) 50 mg tablet; Take 0.5 tablets (25 mg total) by mouth every 12 (twelve) hours    Semaglutide-Weight Management (Wegovy) 0.25 MG/0.5ML; Inject 0.25 mg under the skin weekly    furosemide (LASIX) 40 mg tablet; Take 1 tablet (40 mg total) by mouth 2 (two) times a day    Gastroesophageal reflux disease without esophagitis    Orders:    omeprazole (PriLOSEC) 40 MG capsule; Take 1 capsule (40 mg total) by mouth daily    Atrial fibrillation, unspecified type (HCC)    Orders:    Semaglutide-Weight Management (Wegovy) 0.25 MG/0.5ML; Inject 0.25 mg under the skin weekly    amiodarone 200 mg tablet; Take 1 tablet (200 mg total) by mouth daily    CBC and differential; Future    Comprehensive metabolic panel; Future    Lipid panel; Future    Class 3 severe obesity with serious comorbidity and body mass index (BMI) of 40.0 to 44.9 in adult, unspecified obesity type (HCC)      Orders:    Semaglutide-Weight Management (Wegovy) 0.25 MG/0.5ML; Inject 0.25 mg under the skin weekly    Screening for thyroid disorder    Orders:    TSH, 3rd generation with Free T4 reflex; Future    Screening PSA (prostate specific antigen)    Orders:    PSA, Total Screen; Future    Screening cholesterol level         Mixed hyperlipidemia    Orders:    Lipid panel; Future    Need for hepatitis C screening test    Orders:    Hepatitis C Antibody; Future        Unfortunately patient has not had Wegovy states that it was approved but unfortunately his  pharmacist for the last year has not had it for him.  Patient states that his cardiologist really wants him on this to help with his overall weight in order to improve  his overall A-fib and chronic conditions.     Preventive health issues were discussed with patient, and age appropriate screening tests were ordered as noted in patient's After Visit Summary. Personalized health advice and appropriate referrals for health education or preventive services given if needed, as noted in patient's After Visit Summary.    History of Present Illness     HPI     74-year-old male presenting to the office.  Status post ablation for his A-fib.  States that he has not felt like he has been in A-fib since the procedure.  Goal is to be taken off of amiodarone but is waiting for an evaluation by the cardiologist in the near future.  Patient states that he would like to be on Wegovy but unfortunately his Wegovy was not in stock and he is tried multiple pharmacies with no luck.  Patient states he has been watching his sugars and been watching his diet.  Did go see a general surgeon who stated he did not have a hernia  Patient Care Team:  Jayla Weir MD as PCP - General (Family Medicine)    Review of Systems   Constitutional:  Negative for activity change, appetite change, chills, fatigue and fever.   HENT:  Negative for congestion.    Respiratory:  Negative for cough, chest tightness and shortness of breath.    Cardiovascular:  Positive for leg swelling. Negative for chest pain.   Gastrointestinal:  Negative for abdominal distention, abdominal pain, constipation, diarrhea, nausea and vomiting.   Musculoskeletal:  Positive for arthralgias.   All other systems reviewed and are negative.    Medical History Reviewed by provider this encounter:       Annual Wellness Visit Questionnaire   Benjamin is here for his Subsequent Wellness visit.     Health Risk Assessment:   Patient rates overall health as good. Patient feels that their physical health rating is same. Patient is very satisfied with their life. Eyesight was rated as same. Hearing was rated as same. Patient feels that their emotional  and mental health rating is same. Patients states they are never, rarely angry. Patient states they are never, rarely unusually tired/fatigued. Pain experienced in the last 7 days has been some. Patient's pain rating has been 9/10. Knee pain     Depression Screening:   PHQ-2 Score: 0      Fall Risk Screening:   In the past year, patient has experienced: no history of falling in past year      Home Safety:  Patient does not have trouble with stairs inside or outside of their home. Patient has working smoke alarms and has no working carbon monoxide detector. Home safety hazards include: none.     Nutrition:   Current diet is No Added Salt.     Medications:   Patient is currently taking over-the-counter supplements. OTC medications include: see medication list. Patient is able to manage medications.     Activities of Daily Living (ADLs)/Instrumental Activities of Daily Living (IADLs):   Walk and transfer into and out of bed and chair?: Yes  Dress and groom yourself?: Yes    Bathe or shower yourself?: Yes    Feed yourself? Yes  Do your laundry/housekeeping?: Yes  Manage your money, pay your bills and track your expenses?: Yes  Make your own meals?: Yes    Do your own shopping?: Yes    Previous Hospitalizations:   Any hospitalizations or ED visits within the last 12 months?: Yes    How many hospitalizations have you had in the last year?: 1-2    Hospitalization Comments: Ablation      Advance Care Planning:   Living will: Yes    Advanced directive: Yes      Comments: Will be making changes and bring us a copy    Cognitive Screening:   Provider or family/friend/caregiver concerned regarding cognition?: No    PREVENTIVE SCREENINGS      Cardiovascular Screening:    General: Screening Not Indicated and History Lipid Disorder      Diabetes Screening:     General: Screening Current      Colorectal Cancer Screening:     General: Screening Current      Prostate Cancer Screening:    General: Screening Not Indicated       Osteoporosis Screening:    General: Risks and Benefits Discussed      Abdominal Aortic Aneurysm (AAA) Screening:    Risk factors include: age between 65-76 yo and tobacco use        Lung Cancer Screening:     General: Screening Not Indicated      Hepatitis C Screening:    General: Risks and Benefits Discussed    Screening, Brief Intervention, and Referral to Treatment (SBIRT)    Screening  Typical number of drinks in a day: 0  Typical number of drinks in a week: 0  Interpretation: Low risk drinking behavior.    Single Item Drug Screening:  How often have you used an illegal drug (including marijuana) or a prescription medication for non-medical reasons in the past year? never    Single Item Drug Screen Score: 0  Interpretation: Negative screen for possible drug use disorder    Social Determinants of Health     Financial Resource Strain: Low Risk  (10/25/2023)    Overall Financial Resource Strain (CARDIA)     Difficulty of Paying Living Expenses: Not very hard   Food Insecurity: Low Risk  (7/11/2024)    [facility]    Food Insecurity     Within the past 12 months, the food you bought just didn’t last and you didn’t have money to get more.: Never true     Within the past 12 months, you worried that your food would run out before you got money to buy more.: Never true   Transportation Needs: No Transportation Needs (10/25/2023)    PRAPARE - Transportation     Lack of Transportation (Medical): No     Lack of Transportation (Non-Medical): No   Housing Stability: Low Risk  (7/11/2024)    [facility]    Housing Stability     Are you worried or concerned that in the next two months you may not have stable housing that you own, rent, or stay in as a part of a household?: No     Think about the place you live. Do you have problems with any of the following? (check all that apply): None of the above     No results found.    Objective     /72   Pulse 91   Temp 98.2 °F (36.8 °C)   " Resp 16   Ht 5' 8.5\" (1.74 m)   Wt 119 kg (262 lb 12.8 oz)   SpO2 97%   BMI 39.38 kg/m²     Physical Exam  Vitals reviewed.   Constitutional:       Appearance: He is well-developed.   HENT:      Head: Normocephalic and atraumatic.      Right Ear: Tympanic membrane, ear canal and external ear normal.      Left Ear: Tympanic membrane, ear canal and external ear normal.      Nose: Nose normal.      Mouth/Throat:      Mouth: Mucous membranes are moist.   Eyes:      Conjunctiva/sclera: Conjunctivae normal.      Pupils: Pupils are equal, round, and reactive to light.   Cardiovascular:      Rate and Rhythm: Normal rate and regular rhythm.      Heart sounds: Normal heart sounds.   Pulmonary:      Effort: Pulmonary effort is normal.      Breath sounds: Normal breath sounds. No wheezing.   Abdominal:      General: Bowel sounds are normal. There is no distension.      Palpations: Abdomen is soft. There is no mass.      Tenderness: There is no abdominal tenderness.   Musculoskeletal:         General: No tenderness. Normal range of motion.      Cervical back: Normal range of motion and neck supple.      Right lower leg: Edema present.      Left lower leg: Edema present.   Skin:     General: Skin is warm.      Capillary Refill: Capillary refill takes less than 2 seconds.   Neurological:      Mental Status: He is alert and oriented to person, place, and time.      Cranial Nerves: No cranial nerve deficit.      Sensory: No sensory deficit.      Motor: No abnormal muscle tone.      Coordination: Coordination normal.      Deep Tendon Reflexes: Reflexes normal.   Psychiatric:         Behavior: Behavior normal.         Thought Content: Thought content normal.         Judgment: Judgment normal.         "

## 2024-10-29 ENCOUNTER — TELEPHONE (OUTPATIENT)
Age: 74
End: 2024-10-29

## 2024-10-29 DIAGNOSIS — Z13.220 SCREENING CHOLESTEROL LEVEL: Primary | ICD-10-CM

## 2024-10-29 DIAGNOSIS — I48.91 ATRIAL FIBRILLATION, UNSPECIFIED TYPE (HCC): ICD-10-CM

## 2024-10-29 DIAGNOSIS — E53.8 B12 DEFICIENCY: ICD-10-CM

## 2024-10-29 DIAGNOSIS — R73.09 ABNORMAL GLUCOSE: ICD-10-CM

## 2024-10-29 NOTE — TELEPHONE ENCOUNTER
"Pt calling back for his lab results.  RN discussed the following resutls from Dr. Eagle with pt...    \"Please advise the patient that I reviewed all his blood work  1.  Would like the patient to try to take B12 500 mcg daily if they do not have this dose and only have 1000 mcg then would recommend him taking that Friday through Sunday.  2.  Please advise the patient his sugar is still slightly elevated.  Was he approved for the injection?  Did they have it ready for him?  I know that previously he was approved but they did not have it available  3.  His cholesterol slightly elevated I do recommend fish oil over-the-counter at least at thousand daily; and limiting his red meat and carbohydrates  4.  Prostate level and thyroid level are within normal range     Repeat in 6 months cholesterol and B12 levels.  Would he like the blood work in advance so that he can get it prior to her appointment\"        Pt states that the PA for his Wegovy is still pending.  He would like to repeat labs prior to his appt 04/28/2025.  Please place orders.    "

## 2024-10-31 ENCOUNTER — OFFICE VISIT (OUTPATIENT)
Dept: OBGYN CLINIC | Facility: CLINIC | Age: 74
End: 2024-10-31
Payer: MEDICARE

## 2024-10-31 VITALS
WEIGHT: 260.4 LBS | DIASTOLIC BLOOD PRESSURE: 80 MMHG | SYSTOLIC BLOOD PRESSURE: 130 MMHG | HEIGHT: 69 IN | HEART RATE: 79 BPM | BODY MASS INDEX: 38.57 KG/M2

## 2024-10-31 DIAGNOSIS — M17.0 PRIMARY OSTEOARTHRITIS OF BOTH KNEES: Primary | ICD-10-CM

## 2024-10-31 DIAGNOSIS — G89.29 CHRONIC PAIN OF BOTH KNEES: ICD-10-CM

## 2024-10-31 DIAGNOSIS — Z98.890 S/P ABLATION OF ATRIAL FIBRILLATION: ICD-10-CM

## 2024-10-31 DIAGNOSIS — M25.462 EFFUSION OF LEFT KNEE: ICD-10-CM

## 2024-10-31 DIAGNOSIS — Z86.79 S/P ABLATION OF ATRIAL FIBRILLATION: ICD-10-CM

## 2024-10-31 DIAGNOSIS — M21.162 GENU VARUM OF LEFT LOWER EXTREMITY: ICD-10-CM

## 2024-10-31 DIAGNOSIS — M25.562 CHRONIC PAIN OF BOTH KNEES: ICD-10-CM

## 2024-10-31 DIAGNOSIS — M25.561 CHRONIC PAIN OF BOTH KNEES: ICD-10-CM

## 2024-10-31 PROCEDURE — 99214 OFFICE O/P EST MOD 30 MIN: CPT | Performed by: ORTHOPAEDIC SURGERY

## 2024-10-31 PROCEDURE — 20610 DRAIN/INJ JOINT/BURSA W/O US: CPT | Performed by: ORTHOPAEDIC SURGERY

## 2024-10-31 RX ORDER — TRIAMCINOLONE ACETONIDE 40 MG/ML
80 INJECTION, SUSPENSION INTRA-ARTICULAR; INTRAMUSCULAR
Status: COMPLETED | OUTPATIENT
Start: 2024-10-31 | End: 2024-10-31

## 2024-10-31 RX ORDER — CHLORAL HYDRATE 500 MG
1000 CAPSULE ORAL DAILY
COMMUNITY

## 2024-10-31 RX ORDER — BUPIVACAINE HYDROCHLORIDE 5 MG/ML
2 INJECTION, SOLUTION EPIDURAL; INTRACAUDAL
Status: COMPLETED | OUTPATIENT
Start: 2024-10-31 | End: 2024-10-31

## 2024-10-31 RX ORDER — LANOLIN ALCOHOL/MO/W.PET/CERES
CREAM (GRAM) TOPICAL DAILY
COMMUNITY

## 2024-10-31 RX ADMIN — TRIAMCINOLONE ACETONIDE 80 MG: 40 INJECTION, SUSPENSION INTRA-ARTICULAR; INTRAMUSCULAR at 11:30

## 2024-10-31 RX ADMIN — BUPIVACAINE HYDROCHLORIDE 2 ML: 5 INJECTION, SOLUTION EPIDURAL; INTRACAUDAL at 11:30

## 2024-10-31 NOTE — PROGRESS NOTES
Assessment/Plan:  1. Primary osteoarthritis of both knees  Large joint arthrocentesis: R knee    Large joint arthrocentesis: L knee      2. Genu varum of left lower extremity  Large joint arthrocentesis: L knee      3. Effusion of left knee  Large joint arthrocentesis: L knee      4. Chronic pain of both knees  Large joint arthrocentesis: R knee    Large joint arthrocentesis: L knee      5. BMI 39.0-39.9,adult        6. S/P ablation of atrial fibrillation          Scribe Attestation      I,:  Mayank Myers PA-C am acting as a scribe while in the presence of the attending physician.:       I,:  Jarad Gomez, DO personally performed the services described in this documentation    as scribed in my presence.:           Benjamin is a 74-year-old gentleman known to our practice for his activity related left knee pain due to his severe underlying osteoarthritis presenting with bilateral knee pain due to his underlying osteoarthritis.  He is now doing fairly well after his recent ablation.  Although he is reportedly checking his sugars at home, he does not have a history of diabetes and the available A1c is 5.4 from 2023.  With the return of his discomfort in both knees and swelling in the left knee, we felt it reasonable to offer him cortisone injections.  He consented to and underwent a left knee aspiration and cortisone injection and right knee cortisone injection as detailed below, which he tolerated well without difficulty or complication.  Postinjection instructions were provided.  He was educated that if he continues to check his blood sugars, they will be elevated for the next 3 to 5 days.  We will plan to see him back in 3 to 4 months pending efficacy of today's procedures.  He expressed understanding all of his questions were addressed today    Large joint arthrocentesis: R knee  Universal Protocol:  Consent: Verbal consent obtained.  Risks and benefits: risks, benefits and alternatives were  "discussed  Consent given by: patient  Time out: Immediately prior to procedure a \"time out\" was called to verify the correct patient, procedure, equipment, support staff and site/side marked as required.  Timeout called at: 10/31/2024 12:00 PM.  Site marked: the operative site was marked  Patient identity confirmed: verbally with patient  Supporting Documentation  Indications: pain   Procedure Details  Location: knee - R knee  Preparation: Patient was prepped and draped in the usual sterile fashion  Needle size: 20 G  Ultrasound guidance: no  Approach: anterolateral  Medications administered: 80 mg triamcinolone acetonide 40 mg/mL; 2 mL bupivacaine (PF) 0.5 %    Patient tolerance: patient tolerated the procedure well with no immediate complications  Dressing:  Sterile dressing applied      Large joint arthrocentesis: L knee  Universal Protocol:  Consent: Verbal consent obtained.  Risks and benefits: risks, benefits and alternatives were discussed  Consent given by: patient  Time out: Immediately prior to procedure a \"time out\" was called to verify the correct patient, procedure, equipment, support staff and site/side marked as required.  Timeout called at: 10/31/2024 12:00 PM.  Site marked: the operative site was marked  Patient identity confirmed: verbally with patient  Supporting Documentation  Indications: pain and joint swelling   Procedure Details  Location: knee - L knee  Preparation: Patient was prepped and draped in the usual sterile fashion  Needle size: 18 G  Ultrasound guidance: no  Approach: lateral  Medications administered: 80 mg triamcinolone acetonide 40 mg/mL; 2 mL bupivacaine (PF) 0.5 %    Aspirate amount: 10 mL  Aspirate: serous, blood-tinged and yellow  Patient tolerance: patient tolerated the procedure well with no immediate complications  Dressing:  Sterile dressing applied        Subjective: Left knee follow-up    Patient ID: Benjamin Liu is a 74 y.o. male presenting today for follow-up of " his left knee and with new right knee pain.  He was last seen approximately a year ago when a cortisone injection was deferred because his left knee osteoarthritis and varus deformity were not causing significant symptoms.  He recently underwent an ablation for his atrial fibrillation at [facility].  He reports that he is doing well from his surgery 1 to 2 months ago.  He has had follow-up with no issues or complications.  However, he recently has had a return of his activity related discomfort in the left knee and beginning in the right knee.  He denies any new specific injuries or falls.  He feels that his motion and walking have been limited by his left knee primarily.  He is interested today in repeat injection for his left knee and an injection for his right knee which was not previously treated.    Review of Systems   Constitutional:  Positive for activity change.   HENT: Negative.     Eyes: Negative.    Respiratory: Negative.     Cardiovascular: Negative.    Gastrointestinal: Negative.    Endocrine: Negative.    Genitourinary: Negative.    Musculoskeletal:  Positive for arthralgias, gait problem, joint swelling and myalgias.   Skin: Negative.    Allergic/Immunologic: Negative.    Hematological: Negative.    Psychiatric/Behavioral: Negative.           Past Medical History:   Diagnosis Date    Cardiac disease     atrial fib,pacemaker    Hyperlipidemia     Hypertension        Past Surgical History:   Procedure Laterality Date    FL INJECTION LEFT KNEE (ARTHROGRAM)  2022       History reviewed. No pertinent family history.    Social History     Occupational History    Not on file   Tobacco Use    Smoking status: Former     Current packs/day: 0.00     Types: Cigarettes     Start date: 7/15/1965     Quit date: 3/10/2010     Years since quittin.6    Smokeless tobacco: Never   Vaping Use    Vaping status: Never Used   Substance and Sexual Activity    Alcohol use: Yes     Comment: occas    Drug use: Yes     Sexual activity: Not on file         Current Outpatient Medications:     alfuzosin (UROXATRAL) 10 mg 24 hr tablet, Take 10 mg by mouth daily, Disp: , Rfl:     amiodarone 200 mg tablet, Take 1 tablet (200 mg total) by mouth daily, Disp: , Rfl:     apixaban (ELIQUIS) 5 mg, Take 5 mg by mouth 2 (two) times a day, Disp: , Rfl:     diltiazem (CARDIZEM CD) 120 mg 24 hr capsule, , Disp: , Rfl:     folic acid (FOLVITE) 1 mg tablet, , Disp: , Rfl:     furosemide (LASIX) 40 mg tablet, Take 1 tablet (40 mg total) by mouth 2 (two) times a day, Disp: 180 tablet, Rfl: 1    metoprolol tartrate (LOPRESSOR) 50 mg tablet, Take 0.5 tablets (25 mg total) by mouth every 12 (twelve) hours, Disp: 90 tablet, Rfl: 3    Multiple Vitamin (MULTIVITAMIN ADULT PO), Take by mouth, Disp: , Rfl:     Multiple Vitamins-Minerals (PRESERVISION AREDS PO), Take by mouth, Disp: , Rfl:     Omega-3 Fatty Acids (fish oil) 1,000 mg, Take 1,000 mg by mouth daily, Disp: , Rfl:     omeprazole (PriLOSEC) 40 MG capsule, Take 1 capsule (40 mg total) by mouth daily, Disp: 90 capsule, Rfl: 1    prednisoLONE acetate (PRED FORTE) 1 % ophthalmic suspension, , Disp: , Rfl:     rOPINIRole (REQUIP) 3 mg tablet, Take 1 tablet (3 mg total) by mouth daily at bedtime, Disp: 90 tablet, Rfl: 1    Semaglutide-Weight Management (Wegovy) 0.25 MG/0.5ML, Inject 0.25 mg under the skin weekly, Disp: 2 mL, Rfl: 0    simvastatin (ZOCOR) 40 mg tablet, Take 40 mg by mouth daily at bedtime, Disp: , Rfl:     tamsulosin (FLOMAX) 0.4 mg, Take 0.4 mg by mouth daily with dinner, Disp: , Rfl:     Trexall 10 MG tablet, , Disp: , Rfl:     vitamin B-12 (VITAMIN B-12) 1,000 mcg tablet, Take by mouth daily, Disp: , Rfl:     Allergies   Allergen Reactions    Penicillins        Objective:  Vitals:    10/31/24 1126   BP: 130/80   Pulse: 79       Body mass index is 39.02 kg/m².    Right Knee Exam     Muscle Strength   The patient has normal right knee strength.    Tenderness   The patient is  experiencing tenderness in the medial joint line and patella.    Range of Motion   Extension:  0 normal   Flexion:  120 normal     Tests   Varus: negative Valgus: negative  Drawer:  Anterior - negative      Patellar apprehension: negative    Other   Erythema: absent  Scars: absent  Sensation: normal  Pulse: present  Swelling: mild  Effusion: no effusion present      Left Knee Exam     Muscle Strength   The patient has normal left knee strength.    Tenderness   The patient is experiencing tenderness in the medial joint line and patella.    Range of Motion   Extension:  5 abnormal   Flexion:  90 abnormal     Tests   Varus: negative Valgus: negative  Drawer:  Anterior - negative       Patellar apprehension: negative    Other   Erythema: absent  Scars: present  Sensation: normal  Pulse: present  Swelling: none  Effusion: no effusion present    Comments:  10 degree passively correctable varus deformity  Positive patellofemoral crepitance bilaterally  Ambulates with a cane and an antalgic gait on the left  Grossly distally neurovascularly changed          Observations   Left Knee   Negative for effusion.     Right Knee   Negative for effusion.       Physical Exam  Vitals and nursing note reviewed.   Constitutional:       Appearance: Normal appearance. He is well-developed.      Comments: Body mass index is 39.02 kg/m².   HENT:      Head: Normocephalic and atraumatic.      Right Ear: External ear normal.      Left Ear: External ear normal.      Ears:      Comments: Hard of hearing  Eyes:      Extraocular Movements: Extraocular movements intact.      Conjunctiva/sclera: Conjunctivae normal.   Cardiovascular:      Rate and Rhythm: Normal rate.      Pulses: Normal pulses.   Pulmonary:      Effort: Pulmonary effort is normal.   Musculoskeletal:      Cervical back: Normal range of motion.      Right knee: No effusion.      Left knee: No effusion.      Comments: See ortho exam   Skin:     General: Skin is warm and dry.    Neurological:      General: No focal deficit present.      Mental Status: He is alert and oriented to person, place, and time. Mental status is at baseline.   Psychiatric:         Mood and Affect: Mood normal.         Behavior: Behavior normal.         Thought Content: Thought content normal.         Judgment: Judgment normal.         I have personally reviewed pertinent films in PACS of the previously taken right knee x-rays which demonstrates significant degenerative changes, but not as severe as the left.      This document was created using speech voice recognition software.   Grammatical errors, random word insertions, pronoun errors, and incomplete sentences are an occasional consequence of this system due to software limitations, ambient noise, and hardware issues.   Any formal questions or concerns about content, text, or information contained within the body of this dictation should be directly addressed to the provider for clarification.

## 2024-11-05 NOTE — TELEPHONE ENCOUNTER
PA Wegovy 0.25 MG/0.5ML DENIED    Reason:(Screenshot if applicable)        Message sent to office clinical pool Yes    Denial letter scanned into Media Yes    Appeal started No (Provider will need to decide if appeal is warranted and send clinical documentation to Prior Authorization Team for initiation.)    **Please follow up with your patient regarding denial and next steps**

## 2025-01-24 NOTE — TELEPHONE ENCOUNTER
Goal Outcome Evaluation:      Pt awake and fed dinner. Good appetite. Oriented to self. Very [place]. IV fluids at 100 ml/hr. Skin integrity good. Allevyn pads on coccyx and heels.                                       PA for Zepbound Denied    Reason:(Screenshot if applicable)      Message sent to office clinical pool Yes    Denial letter scanned into Media Yes    Appeal started No

## 2025-01-30 ENCOUNTER — OFFICE VISIT (OUTPATIENT)
Dept: OBGYN CLINIC | Facility: CLINIC | Age: 75
End: 2025-01-30
Payer: MEDICARE

## 2025-01-30 VITALS — BODY MASS INDEX: 38.83 KG/M2 | WEIGHT: 262.2 LBS | HEIGHT: 69 IN

## 2025-01-30 DIAGNOSIS — M17.0 PRIMARY OSTEOARTHRITIS OF BOTH KNEES: Primary | ICD-10-CM

## 2025-01-30 DIAGNOSIS — M25.561 CHRONIC PAIN OF BOTH KNEES: ICD-10-CM

## 2025-01-30 DIAGNOSIS — Z98.890 S/P ABLATION OF ATRIAL FIBRILLATION: ICD-10-CM

## 2025-01-30 DIAGNOSIS — Z86.79 S/P ABLATION OF ATRIAL FIBRILLATION: ICD-10-CM

## 2025-01-30 DIAGNOSIS — M25.562 CHRONIC PAIN OF BOTH KNEES: ICD-10-CM

## 2025-01-30 DIAGNOSIS — G89.29 CHRONIC PAIN OF BOTH KNEES: ICD-10-CM

## 2025-01-30 DIAGNOSIS — M21.162 GENU VARUM OF LEFT LOWER EXTREMITY: ICD-10-CM

## 2025-01-30 PROCEDURE — 99214 OFFICE O/P EST MOD 30 MIN: CPT | Performed by: ORTHOPAEDIC SURGERY

## 2025-01-30 PROCEDURE — 20610 DRAIN/INJ JOINT/BURSA W/O US: CPT | Performed by: ORTHOPAEDIC SURGERY

## 2025-01-30 RX ORDER — TRIAMCINOLONE ACETONIDE 40 MG/ML
80 INJECTION, SUSPENSION INTRA-ARTICULAR; INTRAMUSCULAR
Status: COMPLETED | OUTPATIENT
Start: 2025-01-30 | End: 2025-01-30

## 2025-01-30 RX ORDER — BUPIVACAINE HYDROCHLORIDE 5 MG/ML
2 INJECTION, SOLUTION EPIDURAL; INTRACAUDAL
Status: COMPLETED | OUTPATIENT
Start: 2025-01-30 | End: 2025-01-30

## 2025-01-30 RX ADMIN — TRIAMCINOLONE ACETONIDE 80 MG: 40 INJECTION, SUSPENSION INTRA-ARTICULAR; INTRAMUSCULAR at 10:30

## 2025-01-30 RX ADMIN — BUPIVACAINE HYDROCHLORIDE 2 ML: 5 INJECTION, SOLUTION EPIDURAL; INTRACAUDAL at 10:30

## 2025-02-25 DIAGNOSIS — K21.9 GASTROESOPHAGEAL REFLUX DISEASE WITHOUT ESOPHAGITIS: ICD-10-CM

## 2025-02-26 RX ORDER — OMEPRAZOLE 40 MG/1
40 CAPSULE, DELAYED RELEASE ORAL DAILY
Qty: 90 CAPSULE | Refills: 1 | Status: SHIPPED | OUTPATIENT
Start: 2025-02-26

## 2025-03-19 ENCOUNTER — HOSPITAL ENCOUNTER (EMERGENCY)
Facility: HOSPITAL | Age: 75
Discharge: HOME | End: 2025-03-19
Attending: EMERGENCY MEDICINE | Admitting: EMERGENCY MEDICINE
Payer: MEDICARE

## 2025-03-19 VITALS
RESPIRATION RATE: 18 BRPM | HEART RATE: 88 BPM | TEMPERATURE: 99.5 F | DIASTOLIC BLOOD PRESSURE: 71 MMHG | SYSTOLIC BLOOD PRESSURE: 168 MMHG | OXYGEN SATURATION: 95 %

## 2025-03-19 DIAGNOSIS — N39.0 UTI (URINARY TRACT INFECTION): Primary | ICD-10-CM

## 2025-03-19 LAB
BACTERIA UR QL AUTO: ABNORMAL /HPF
BILIRUB UR QL STRIP: NEGATIVE
CLARITY UR: CLEAR
COLOR UR: YELLOW
GLUCOSE UR STRIP-MCNC: NEGATIVE MG/DL
HGB UR QL STRIP.AUTO: ABNORMAL
KETONES UR STRIP-MCNC: NEGATIVE MG/DL
LEUKOCYTE ESTERASE UR QL STRIP: ABNORMAL
NITRITE UR QL STRIP: NEGATIVE
NON-SQ EPI CELLS URNS QL MICRO: ABNORMAL /HPF
PH UR STRIP.AUTO: 6 [PH]
PROT UR STRIP-MCNC: ABNORMAL MG/DL
RBC #/AREA URNS AUTO: ABNORMAL /HPF
SP GR UR STRIP.AUTO: >=1.03 (ref 1–1.03)
UROBILINOGEN UR STRIP-ACNC: <2 MG/DL
WBC #/AREA URNS AUTO: ABNORMAL /HPF

## 2025-03-19 PROCEDURE — 99283 EMERGENCY DEPT VISIT LOW MDM: CPT

## 2025-03-19 PROCEDURE — 87086 URINE CULTURE/COLONY COUNT: CPT | Performed by: EMERGENCY MEDICINE

## 2025-03-19 PROCEDURE — 99284 EMERGENCY DEPT VISIT MOD MDM: CPT | Performed by: EMERGENCY MEDICINE

## 2025-03-19 PROCEDURE — 81001 URINALYSIS AUTO W/SCOPE: CPT | Performed by: EMERGENCY MEDICINE

## 2025-03-19 RX ORDER — CEPHALEXIN 500 MG/1
500 CAPSULE ORAL 2 TIMES DAILY
Qty: 10 CAPSULE | Refills: 0 | Status: SHIPPED | OUTPATIENT
Start: 2025-03-19 | End: 2025-03-24

## 2025-03-19 RX ORDER — CEPHALEXIN 500 MG/1
500 CAPSULE ORAL ONCE
Status: COMPLETED | OUTPATIENT
Start: 2025-03-19 | End: 2025-03-19

## 2025-03-19 RX ADMIN — CEPHALEXIN 500 MG: 500 CAPSULE ORAL at 20:54

## 2025-03-20 NOTE — ED NOTES
Patient ambulated out of the ED, AAOx4 resp even and unlabored with no S$S of distress.       Yoni Cabezas RN  03/19/25 2058

## 2025-03-21 LAB — BACTERIA UR CULT: NORMAL

## 2025-03-21 NOTE — ED PROVIDER NOTES
Time reflects when diagnosis was documented in both MDM as applicable and the Disposition within this note       Time User Action Codes Description Comment    3/19/2025  8:46 PM Raul Fleming Add [N39.0] UTI (urinary tract infection)           ED Disposition       ED Disposition   Discharge    Condition   Stable    Date/Time   Wed Mar 19, 2025  8:46 PM    Comment   Benjamin Liu discharge to home/self care.                   Assessment & Plan       Medical Decision Making  Pulse ox 95% on room air indicating adequate oxygenation.    Amount and/or Complexity of Data Reviewed  Labs: ordered.    Risk  Prescription drug management.             Medications   cephalexin (KEFLEX) capsule 500 mg (500 mg Oral Given 3/19/25 2054)       ED Risk Strat Scores                                                History of Present Illness       Chief Complaint   Patient presents with    Possible UTI     Started with dysuria and frequency this afternoon, scoped two weeks ago for bladder emptying  prob       Past Medical History:   Diagnosis Date    Cardiac disease     atrial fib,pacemaker    Hyperlipidemia     Hypertension       Past Surgical History:   Procedure Laterality Date    AV NODE ABLATION      FL INJECTION LEFT KNEE (ARTHROGRAM)  12/06/2022      History reviewed. No pertinent family history.   Social History     Tobacco Use    Smoking status: Former     Current packs/day: 0.00     Types: Cigarettes     Start date: 7/15/1965     Quit date: 3/10/2010     Years since quitting: 15.0    Smokeless tobacco: Never   Vaping Use    Vaping status: Never Used   Substance Use Topics    Alcohol use: Yes     Comment: occas    Drug use: Yes      E-Cigarette/Vaping    E-Cigarette Use Never User       E-Cigarette/Vaping Substances    Nicotine No     THC No     CBD No     Flavoring No     Other No     Unknown No       I have reviewed and agree with the history as documented.     Patient presents for evaluation of burning with urination and  frequency starting this afternoon.  States he was scoped 2 weeks ago after having trouble emptying his bladder and the doctor was able to clear out some scar tissue.  Denies any problem emptying his bladder currently.  Denies any back or stomach pain.  No nausea or vomiting.      History provided by:  Patient   used: No        Review of Systems   Genitourinary:  Positive for dysuria and frequency.   All other systems reviewed and are negative.          Objective       ED Triage Vitals [03/19/25 1937]   Temperature Pulse Blood Pressure Respirations SpO2 Patient Position - Orthostatic VS   99.5 °F (37.5 °C) 88 168/71 (!) 24 95 % Sitting      Temp Source Heart Rate Source BP Location FiO2 (%) Pain Score    Tympanic Monitor Right arm -- No Pain      Vitals      Date and Time Temp Pulse SpO2 Resp BP Pain Score FACES Pain Rating User   03/19/25 2055 -- -- -- 18 -- -- -- MB   03/19/25 1937 99.5 °F (37.5 °C) 88 95 % 24 168/71 No Pain -- LS            Physical Exam  Vitals and nursing note reviewed.   Constitutional:       General: He is not in acute distress.  Cardiovascular:      Rate and Rhythm: Normal rate and regular rhythm.   Pulmonary:      Effort: Pulmonary effort is normal. No respiratory distress.      Breath sounds: Normal breath sounds.   Abdominal:      Tenderness: There is no abdominal tenderness. There is no right CVA tenderness or left CVA tenderness.   Neurological:      General: No focal deficit present.      Mental Status: He is alert and oriented to person, place, and time.         Results Reviewed       Procedure Component Value Units Date/Time    Urine culture [[id number]] Collected: 03/19/25 2019    Lab Status: Final result Specimen: Urine, Clean Catch Updated: 03/21/25 0710     Urine Culture 50,000-59,000 cfu/ml    Narrative:      Resembling mixed skin and/or urogenital lowell     Urine Microscopic [[id number]]  (Abnormal) Collected: 03/19/25 2019    Lab Status: Final result Specimen:  Urine, Clean Catch Updated: 03/19/25 2044     RBC, UA Innumerable /hpf      WBC, UA Innumerable /hpf      Epithelial Cells Occasional /hpf      Bacteria, UA Innumerable /hpf     UA w Reflex to Microscopic w Reflex to Culture [[id number]]  (Abnormal) Collected: 03/19/25 2019    Lab Status: Final result Specimen: Urine, Clean Catch Updated: 03/19/25 2029     Color, UA Yellow     Clarity, UA Clear     Specific Gravity, UA >=1.030     pH, UA 6.0     Leukocytes, UA Large     Nitrite, UA Negative     Protein,  (2+) mg/dl      Glucose, UA Negative mg/dl      Ketones, UA Negative mg/dl      Urobilinogen, UA <2.0 mg/dl      Bilirubin, UA Negative     Occult Blood, UA Large            No orders to display       Procedures    ED Medication and Procedure Management   Prior to Admission Medications   Prescriptions Last Dose Informant Patient Reported? Taking?   Multiple Vitamin (MULTIVITAMIN ADULT PO)   Yes No   Sig: Take by mouth   Multiple Vitamins-Minerals (PRESERVISION AREDS PO)   Yes No   Sig: Take by mouth   Omega-3 Fatty Acids (fish oil) 1,000 mg   Yes No   Sig: Take 1,000 mg by mouth daily   Semaglutide-Weight Management (Wegovy) 0.25 MG/0.5ML   No No   Sig: Inject 0.25 mg under the skin weekly   Trexall 10 MG tablet   Yes No   alfuzosin (UROXATRAL) 10 mg 24 hr tablet  Self Yes No   Sig: Take 10 mg by mouth daily   amiodarone 200 mg tablet   No No   Sig: Take 1 tablet (200 mg total) by mouth daily   apixaban (ELIQUIS) 5 mg  Self Yes No   Sig: Take 5 mg by mouth 2 (two) times a day   diltiazem (CARDIZEM CD) 120 mg 24 hr capsule  Self Yes No   folic acid (FOLVITE) 1 mg tablet  Self Yes No   furosemide (LASIX) 40 mg tablet   No No   Sig: Take 1 tablet (40 mg total) by mouth 2 (two) times a day   metoprolol tartrate (LOPRESSOR) 50 mg tablet   No No   Sig: Take 0.5 tablets (25 mg total) by mouth every 12 (twelve) hours   omeprazole (PriLOSEC) 40 MG capsule   No No   Sig: Take 1 capsule (40 mg total) by mouth daily    prednisoLONE acetate (PRED FORTE) 1 % ophthalmic suspension   Yes No   Patient not taking: Reported on 1/30/2025   rOPINIRole (REQUIP) 3 mg tablet   No No   Sig: Take 1 tablet (3 mg total) by mouth daily at bedtime   simvastatin (ZOCOR) 40 mg tablet  Self Yes No   Sig: Take 40 mg by mouth daily at bedtime   tamsulosin (FLOMAX) 0.4 mg   Yes No   Sig: Take 0.4 mg by mouth daily with dinner   vitamin B-12 (VITAMIN B-12) 1,000 mcg tablet   Yes No   Sig: Take by mouth daily      Facility-Administered Medications: None     Discharge Medication List as of 3/19/2025  8:47 PM        START taking these medications    Details   cephalexin (KEFLEX) 500 mg capsule Take 1 capsule (500 mg total) by mouth 2 (two) times a day for 5 days, Starting Wed 3/19/2025, Until Mon 3/24/2025, Normal           CONTINUE these medications which have NOT CHANGED    Details   alfuzosin (UROXATRAL) 10 mg 24 hr tablet Take 10 mg by mouth daily, Historical Med      amiodarone 200 mg tablet Take 1 tablet (200 mg total) by mouth daily, Starting Mon 10/28/2024, No Print      apixaban (ELIQUIS) 5 mg Take 5 mg by mouth 2 (two) times a day, Historical Med      diltiazem (CARDIZEM CD) 120 mg 24 hr capsule Starting Wed 1/11/2023, Historical Med      folic acid (FOLVITE) 1 mg tablet Starting u 10/13/2022, Historical Med      furosemide (LASIX) 40 mg tablet Take 1 tablet (40 mg total) by mouth 2 (two) times a day, Starting Mon 10/28/2024, Normal      metoprolol tartrate (LOPRESSOR) 50 mg tablet Take 0.5 tablets (25 mg total) by mouth every 12 (twelve) hours, Starting Mon 10/28/2024, Normal      Multiple Vitamin (MULTIVITAMIN ADULT PO) Take by mouth, Historical Med      Multiple Vitamins-Minerals (PRESERVISION AREDS PO) Take by mouth, Historical Med      Omega-3 Fatty Acids (fish oil) 1,000 mg Take 1,000 mg by mouth daily, Historical Med      omeprazole (PriLOSEC) 40 MG capsule Take 1 capsule (40 mg total) by mouth daily, Starting Wed 2/26/2025, Normal       prednisoLONE acetate (PRED FORTE) 1 % ophthalmic suspension Starting Fri 5/19/2023, Historical Med      rOPINIRole (REQUIP) 3 mg tablet Take 1 tablet (3 mg total) by mouth daily at bedtime, Starting Mon 7/8/2024, Normal      Semaglutide-Weight Management (Wegovy) 0.25 MG/0.5ML Inject 0.25 mg under the skin weekly, Normal      simvastatin (ZOCOR) 40 mg tablet Take 40 mg by mouth daily at bedtime, Historical Med      tamsulosin (FLOMAX) 0.4 mg Take 0.4 mg by mouth daily with dinner, Starting Fri 9/6/2024, Historical Med      Trexall 10 MG tablet Historical Med      vitamin B-12 (VITAMIN B-12) 1,000 mcg tablet Take by mouth daily, Historical Med           No discharge procedures on file.  ED SEPSIS DOCUMENTATION   Time reflects when diagnosis was documented in both MDM as applicable and the Disposition within this note       Time User Action Codes Description Comment    3/19/2025  8:46 PM Raul Fleming Add [N39.0] UTI (urinary tract infection)                  Raul Fleming DO  03/21/25 1406

## 2025-03-24 DIAGNOSIS — I48.91 ATRIAL FIBRILLATION WITH RVR (HCC): Primary | ICD-10-CM

## 2025-03-24 RX ORDER — METOPROLOL TARTRATE 50 MG
50 TABLET ORAL EVERY 12 HOURS SCHEDULED
Qty: 90 TABLET | Refills: 0 | Status: SHIPPED | OUTPATIENT
Start: 2025-03-24 | End: 2025-03-25 | Stop reason: SDUPTHER

## 2025-03-24 NOTE — TELEPHONE ENCOUNTER
Requested medication(s) are due for refill today: Yes  Patient has already received a courtesy refill: No  Other reason request has been forwarded to provider: Patient has been taking a full tablet twice daily instead of 1/2 tab twice daily. He ran out of medication and is requesting a 90 day supply be sent to his local pharmacy [id number] - [place], [place] - [address] [place] [address] [phone]

## 2025-03-24 NOTE — TELEPHONE ENCOUNTER
Patient calling back in regards to his prescription for   metoprolol tartrate (LOPRESSOR) 50 mg tablet. States he is out of medication and needs this filled ASAP. Please follow up with patient.

## 2025-03-24 NOTE — TELEPHONE ENCOUNTER
Requested medication(s) are due for refill today: Yes  Patient has already received a courtesy refill: No  Other reason request has been forwarded to provider: Patient calling back in regards to his prescription for   metoprolol tartrate (LOPRESSOR) 50 mg tablet. States he is out of medication and needs this filled ASAP. Please follow up with patient.

## 2025-03-24 NOTE — TELEPHONE ENCOUNTER
Patient has been taking a full tablet twice daily instead of 1/2 tab twice daily. He ran out of medication and is requesting a 90 day supply be sent to his local pharmacy [id number] - [address] [phone]

## 2025-03-25 ENCOUNTER — OFFICE VISIT (OUTPATIENT)
Dept: FAMILY MEDICINE CLINIC | Facility: CLINIC | Age: 75
End: 2025-03-25
Payer: MEDICARE

## 2025-03-25 VITALS
HEIGHT: 69 IN | RESPIRATION RATE: 18 BRPM | OXYGEN SATURATION: 96 % | HEART RATE: 68 BPM | WEIGHT: 262.2 LBS | SYSTOLIC BLOOD PRESSURE: 128 MMHG | TEMPERATURE: 98.3 F | BODY MASS INDEX: 38.83 KG/M2 | DIASTOLIC BLOOD PRESSURE: 80 MMHG

## 2025-03-25 DIAGNOSIS — G25.81 RESTLESS LEG: ICD-10-CM

## 2025-03-25 DIAGNOSIS — E66.813 CLASS 3 SEVERE OBESITY WITH SERIOUS COMORBIDITY AND BODY MASS INDEX (BMI) OF 40.0 TO 44.9 IN ADULT, UNSPECIFIED OBESITY TYPE (HCC): ICD-10-CM

## 2025-03-25 DIAGNOSIS — I27.20 PULMONARY HYPERTENSION, UNSPECIFIED (HCC): ICD-10-CM

## 2025-03-25 DIAGNOSIS — C93.Z0: ICD-10-CM

## 2025-03-25 DIAGNOSIS — I48.91 ATRIAL FIBRILLATION WITH RVR (HCC): ICD-10-CM

## 2025-03-25 DIAGNOSIS — R73.09 ABNORMAL GLUCOSE: ICD-10-CM

## 2025-03-25 DIAGNOSIS — R30.0 DYSURIA: Primary | ICD-10-CM

## 2025-03-25 DIAGNOSIS — E66.01 CLASS 3 SEVERE OBESITY WITH SERIOUS COMORBIDITY AND BODY MASS INDEX (BMI) OF 40.0 TO 44.9 IN ADULT, UNSPECIFIED OBESITY TYPE (HCC): ICD-10-CM

## 2025-03-25 PROCEDURE — G2211 COMPLEX E/M VISIT ADD ON: HCPCS | Performed by: FAMILY MEDICINE

## 2025-03-25 PROCEDURE — 99214 OFFICE O/P EST MOD 30 MIN: CPT | Performed by: FAMILY MEDICINE

## 2025-03-25 RX ORDER — FUROSEMIDE 40 MG/1
40 TABLET ORAL 2 TIMES DAILY
Qty: 180 TABLET | Refills: 1 | Status: SHIPPED | OUTPATIENT
Start: 2025-03-25

## 2025-03-25 RX ORDER — ROPINIROLE 3 MG/1
3 TABLET, FILM COATED ORAL
Qty: 90 TABLET | Refills: 1 | Status: SHIPPED | OUTPATIENT
Start: 2025-03-25

## 2025-03-25 RX ORDER — METOPROLOL TARTRATE 50 MG
50 TABLET ORAL EVERY 12 HOURS SCHEDULED
Qty: 180 TABLET | Refills: 2 | Status: SHIPPED | OUTPATIENT
Start: 2025-03-25 | End: 2025-06-23

## 2025-03-25 NOTE — ASSESSMENT & PLAN NOTE
Orders:  •  metoprolol tartrate (LOPRESSOR) 50 mg tablet; Take 1 tablet (50 mg total) by mouth every 12 (twelve) hours  •  Comprehensive metabolic panel; Future

## 2025-03-25 NOTE — PROGRESS NOTES
Name: Benjamin Liu      : 1950      MRN: [id number]  Encounter Provider: Jayla Weir MD  Encounter Date: 3/25/2025   Encounter department: [facility]    Assessment & Plan  Dysuria    Orders:  •  UA w Reflex to Microscopic w Reflex to Culture; Future    Atrial fibrillation with RVR (HCC)    Orders:  •  metoprolol tartrate (LOPRESSOR) 50 mg tablet; Take 1 tablet (50 mg total) by mouth every 12 (twelve) hours  •  Comprehensive metabolic panel; Future    Class 3 severe obesity with serious comorbidity and body mass index (BMI) of 40.0 to 44.9 in adult, unspecified obesity type (HCC)  Was denied for weight loss medications unfortunately.         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  Patient states that he would like a refill on all his medications if possible.  States that he continues to be seeing all the specialist as indicated.  At this time he has no acute concerns about his medications but does need a refill of possible.  Was just recently seen in the emergency room for a UTI.  States that overall he has been doing fairly well.  I will be following up with the urologist.  States he has no symptoms like he did that drove him to the emergency room    Review of Systems   Constitutional:  Negative for activity change, appetite change, chills, fatigue and fever.   HENT:  Negative for congestion.    Respiratory:  Negative for cough, chest tightness and shortness of breath.    Cardiovascular:  Negative for chest pain and leg  swelling.   Gastrointestinal:  Negative for abdominal distention, abdominal pain, constipation, diarrhea, nausea and vomiting.   All other systems reviewed and are negative.    Past Medical History:   Diagnosis Date   • Cardiac disease     atrial fib,pacemaker   • Hyperlipidemia    • Hypertension      Past Surgical History:   Procedure Laterality Date   • AV NODE ABLATION     • FL INJECTION LEFT KNEE (ARTHROGRAM)  12/06/2022     History reviewed. No pertinent family history.  Social History     Tobacco Use   • Smoking status: Former     Current packs/day: 0.00     Types: Cigarettes     Start date: 7/15/1965     Quit date: 3/10/2010     Years since quitting: 15.0   • Smokeless tobacco: Never   Vaping Use   • Vaping status: Never Used   Substance and Sexual Activity   • Alcohol use: Yes     Comment: occas   • Drug use: Yes   • Sexual activity: Not on file     Current Outpatient Medications on File Prior to Visit   Medication Sig   • apixaban (ELIQUIS) 5 mg Take 5 mg by mouth 2 (two) times a day   • diltiazem (CARDIZEM CD) 120 mg 24 hr capsule    • folic acid (FOLVITE) 1 mg tablet    • Multiple Vitamin (MULTIVITAMIN ADULT PO) Take by mouth   • Multiple Vitamin (MULTIVITAMIN ADULT PO) ORAL   • Multiple Vitamins-Minerals (PRESERVISION AREDS PO) Take by mouth   • Omega-3 Fatty Acids (fish oil) 1,000 mg Take 1,000 mg by mouth daily   • omeprazole (PriLOSEC) 40 MG capsule Take 1 capsule (40 mg total) by mouth daily   • simvastatin (ZOCOR) 40 mg tablet Take 40 mg by mouth daily at bedtime   • Trexall 10 MG tablet    • alfuzosin (UROXATRAL) 10 mg 24 hr tablet Take 10 mg by mouth daily (Patient not taking: Reported on 3/25/2025)   • prednisoLONE acetate (PRED FORTE) 1 % ophthalmic suspension  (Patient not taking: Reported on 3/25/2025)   • tamsulosin (FLOMAX) 0.4 mg Take 0.4 mg by mouth daily with dinner (Patient not taking: Reported on 3/25/2025)   • vitamin B-12 (VITAMIN B-12) 1,000 mcg tablet Take by mouth daily (Patient  "not taking: Reported on 3/25/2025)     Allergies   Allergen Reactions   • Penicillins      Immunization History   Administered Date(s) Administered   • COVID-19 MODERNA VACC 0.5 ML IM 02/27/2021, 03/28/2021   • INFLUENZA 10/10/2023   • Influenza, high dose seasonal 0.7 mL 10/10/2023, 02/17/2024   • influenza, trivalent, adjuvanted 08/15/2024     Objective   /80 (BP Location: Left arm, Patient Position: Sitting, Cuff Size: Standard)   Pulse 68   Temp 98.3 °F (36.8 °C) (Temporal)   Resp 18   Ht 5' 8.5\" (1.74 m)   Wt 119 kg (262 lb 3.2 oz)   SpO2 96%   BMI 39.29 kg/m²     Physical Exam  Constitutional:       Appearance: He is well-developed.   HENT:      Head: Normocephalic and atraumatic.   Eyes:      Conjunctiva/sclera: Conjunctivae normal.      Pupils: Pupils are equal, round, and reactive to light.   Pulmonary:      Effort: Pulmonary effort is normal.   Neurological:      Mental Status: He is alert and oriented to person, place, and time.   Psychiatric:         Behavior: Behavior normal.         Thought Content: Thought content normal.         Judgment: Judgment normal.         "

## 2025-03-26 ENCOUNTER — APPOINTMENT (OUTPATIENT)
Dept: LAB | Facility: CLINIC | Age: 75
End: 2025-03-26
Payer: MEDICARE

## 2025-03-26 DIAGNOSIS — E53.8 B12 DEFICIENCY: ICD-10-CM

## 2025-03-26 DIAGNOSIS — I48.91 ATRIAL FIBRILLATION WITH RVR (HCC): ICD-10-CM

## 2025-03-26 DIAGNOSIS — R30.0 DYSURIA: ICD-10-CM

## 2025-03-26 DIAGNOSIS — I48.91 ATRIAL FIBRILLATION, UNSPECIFIED TYPE (HCC): ICD-10-CM

## 2025-03-26 DIAGNOSIS — Z13.220 SCREENING CHOLESTEROL LEVEL: ICD-10-CM

## 2025-03-26 LAB
ALBUMIN SERPL BCG-MCNC: 4.7 G/DL (ref 3.5–5)
ALP SERPL-CCNC: 83 U/L (ref 34–104)
ALT SERPL W P-5'-P-CCNC: 27 U/L (ref 7–52)
ANION GAP SERPL CALCULATED.3IONS-SCNC: 9 MMOL/L (ref 4–13)
ANISOCYTOSIS BLD QL SMEAR: PRESENT
AST SERPL W P-5'-P-CCNC: 22 U/L (ref 13–39)
BACTERIA UR QL AUTO: ABNORMAL /HPF
BASOPHILS # BLD MANUAL: 0.09 THOUSAND/UL (ref 0–0.1)
BASOPHILS NFR MAR MANUAL: 2 % (ref 0–1)
BILIRUB SERPL-MCNC: 0.5 MG/DL (ref 0.2–1)
BILIRUB UR QL STRIP: NEGATIVE
BUN SERPL-MCNC: 26 MG/DL (ref 5–25)
CALCIUM SERPL-MCNC: 9.4 MG/DL (ref 8.4–10.2)
CHLORIDE SERPL-SCNC: 104 MMOL/L (ref 96–108)
CHOLEST SERPL-MCNC: 167 MG/DL (ref ?–200)
CLARITY UR: CLEAR
CO2 SERPL-SCNC: 30 MMOL/L (ref 21–32)
COLOR UR: ABNORMAL
CREAT SERPL-MCNC: 0.78 MG/DL (ref 0.6–1.3)
EOSINOPHIL # BLD MANUAL: 0.05 THOUSAND/UL (ref 0–0.4)
EOSINOPHIL NFR BLD MANUAL: 1 % (ref 0–6)
ERYTHROCYTE [DISTWIDTH] IN BLOOD BY AUTOMATED COUNT: 15.4 % (ref 11.6–15.1)
GFR SERPL CREATININE-BSD FRML MDRD: 88 ML/MIN/1.73SQ M
GLUCOSE P FAST SERPL-MCNC: 120 MG/DL (ref 65–99)
GLUCOSE UR STRIP-MCNC: NEGATIVE MG/DL
HCT VFR BLD AUTO: 49.6 % (ref 36.5–49.3)
HDLC SERPL-MCNC: 47 MG/DL
HGB BLD-MCNC: 15.7 G/DL (ref 12–17)
HGB UR QL STRIP.AUTO: NEGATIVE
KETONES UR STRIP-MCNC: NEGATIVE MG/DL
LDLC SERPL CALC-MCNC: 94 MG/DL (ref 0–100)
LEUKOCYTE ESTERASE UR QL STRIP: NEGATIVE
LYMPHOCYTES # BLD AUTO: 0.92 THOUSAND/UL (ref 0.6–4.47)
LYMPHOCYTES # BLD AUTO: 18 % (ref 14–44)
MCH RBC QN AUTO: 33.4 PG (ref 26.8–34.3)
MCHC RBC AUTO-ENTMCNC: 31.7 G/DL (ref 31.4–37.4)
MCV RBC AUTO: 106 FL (ref 82–98)
METAMYELOCYTE ABSOLUTE CT: 0.05 THOUSAND/UL (ref 0–0.1)
METAMYELOCYTES NFR BLD MANUAL: 1 % (ref 0–1)
MONOCYTES # BLD AUTO: 0.55 THOUSAND/UL (ref 0–1.22)
MONOCYTES NFR BLD: 12 % (ref 4–12)
MUCOUS THREADS UR QL AUTO: ABNORMAL
NEUTROPHILS # BLD MANUAL: 2.94 THOUSAND/UL (ref 1.85–7.62)
NEUTS BAND NFR BLD MANUAL: 5 % (ref 0–8)
NEUTS SEG NFR BLD AUTO: 59 % (ref 43–75)
NITRITE UR QL STRIP: NEGATIVE
NON-SQ EPI CELLS URNS QL MICRO: ABNORMAL /HPF
NONHDLC SERPL-MCNC: 120 MG/DL
PH UR STRIP.AUTO: 6 [PH]
PLATELET # BLD AUTO: 263 THOUSANDS/UL (ref 149–390)
PLATELET BLD QL SMEAR: ADEQUATE
PMV BLD AUTO: 9.7 FL (ref 8.9–12.7)
POTASSIUM SERPL-SCNC: 4.1 MMOL/L (ref 3.5–5.3)
PROT SERPL-MCNC: 7.6 G/DL (ref 6.4–8.4)
PROT UR STRIP-MCNC: ABNORMAL MG/DL
RBC # BLD AUTO: 4.7 MILLION/UL (ref 3.88–5.62)
RBC #/AREA URNS AUTO: ABNORMAL /HPF
RBC MORPH BLD: PRESENT
SODIUM SERPL-SCNC: 143 MMOL/L (ref 135–147)
SP GR UR STRIP.AUTO: 1.02 (ref 1–1.03)
TRIGL SERPL-MCNC: 132 MG/DL (ref ?–150)
UROBILINOGEN UR STRIP-ACNC: <2 MG/DL
VARIANT LYMPHS # BLD AUTO: 2 %
VIT B12 SERPL-MCNC: 1768 PG/ML (ref 180–914)
WBC # BLD AUTO: 4.59 THOUSAND/UL (ref 4.31–10.16)
WBC #/AREA URNS AUTO: ABNORMAL /HPF

## 2025-03-26 PROCEDURE — 36415 COLL VENOUS BLD VENIPUNCTURE: CPT

## 2025-03-26 PROCEDURE — 85027 COMPLETE CBC AUTOMATED: CPT

## 2025-03-26 PROCEDURE — 85007 BL SMEAR W/DIFF WBC COUNT: CPT

## 2025-03-26 PROCEDURE — 81001 URINALYSIS AUTO W/SCOPE: CPT

## 2025-03-26 PROCEDURE — 80061 LIPID PANEL: CPT

## 2025-03-26 PROCEDURE — 80053 COMPREHEN METABOLIC PANEL: CPT

## 2025-03-26 PROCEDURE — 82607 VITAMIN B-12: CPT

## 2025-03-30 ENCOUNTER — RESULTS FOLLOW-UP (OUTPATIENT)
Dept: FAMILY MEDICINE CLINIC | Facility: CLINIC | Age: 75
End: 2025-03-30

## 2025-04-28 ENCOUNTER — OFFICE VISIT (OUTPATIENT)
Dept: FAMILY MEDICINE CLINIC | Facility: CLINIC | Age: 75
End: 2025-04-28
Payer: MEDICARE

## 2025-04-28 VITALS
OXYGEN SATURATION: 96 % | BODY MASS INDEX: 40.2 KG/M2 | SYSTOLIC BLOOD PRESSURE: 118 MMHG | HEIGHT: 69 IN | WEIGHT: 271.4 LBS | DIASTOLIC BLOOD PRESSURE: 64 MMHG | RESPIRATION RATE: 16 BRPM | TEMPERATURE: 97.6 F | HEART RATE: 83 BPM

## 2025-04-28 DIAGNOSIS — I48.91 ATRIAL FIBRILLATION WITH RVR (HCC): ICD-10-CM

## 2025-04-28 DIAGNOSIS — I87.2 EDEMA OF BOTH LOWER EXTREMITIES DUE TO PERIPHERAL VENOUS INSUFFICIENCY: Primary | ICD-10-CM

## 2025-04-28 PROCEDURE — G2211 COMPLEX E/M VISIT ADD ON: HCPCS | Performed by: FAMILY MEDICINE

## 2025-04-28 PROCEDURE — 99214 OFFICE O/P EST MOD 30 MIN: CPT | Performed by: FAMILY MEDICINE

## 2025-04-28 RX ORDER — BEPOTASTINE BESILATE 15 MG/ML
SOLUTION/ DROPS OPHTHALMIC
COMMUNITY
Start: 2025-03-31

## 2025-04-28 NOTE — PROGRESS NOTES
Name: Benjamin Liu      : 1950      MRN: [id number]  Encounter Provider: Jayla Weir MD  Encounter Date: 2025   Encounter department: [facility]    Assessment & Plan  Edema of both lower extremities due to peripheral venous insufficiency  10LB of Fluid.  VERY concerned. If 1 week still elevated, will send to ED for IV Lasix.  Recommend calling Cardiology for guidance, and advise of my plan.   See Cardiology Dr. Lazar.   Unsure if the patient can be switched to Bumex versus torsemide to see if there is better improvement.          Atrial fibrillation with RVR (HCC)  Stable            History of Present Illness     HPI  74-year-old male presenting to the office for chronic follow-up.  Patient overall has been doing very well.  Patient states that he noticed that he is 10 pounds overweight.  States that his legs are severely swollen.  Does see the cardiologist.  States that he feels like the pill is not working given that he is not peeing as much as he normally well.  Denies any chest pain or shortness of breath today.  Review of Systems   Constitutional:  Negative for activity change, appetite change, chills, fatigue and fever.   HENT:  Negative for congestion.    Respiratory:  Negative for cough, chest tightness and shortness of breath.    Cardiovascular:  Positive for leg swelling. Negative for chest pain.   Gastrointestinal:  Negative for abdominal distention, abdominal pain, constipation, diarrhea, nausea and vomiting.   Musculoskeletal:  Positive for arthralgias.   All other systems reviewed and are negative.    Past Medical History:   Diagnosis Date   • Cardiac disease     atrial fib,pacemaker   • Hyperlipidemia    • Hypertension      Past Surgical History:   Procedure Laterality Date   • AV NODE ABLATION     • FL INJECTION LEFT KNEE (ARTHROGRAM)  2022     History reviewed. No pertinent family history.  Social History     Tobacco Use   • Smoking status:  Former     Current packs/day: 0.00     Types: Cigarettes     Start date: 7/15/1965     Quit date: 3/10/2010     Years since quitting: 15.1   • Smokeless tobacco: Never   Vaping Use   • Vaping status: Never Used   Substance and Sexual Activity   • Alcohol use: Yes     Comment: occas   • Drug use: Yes   • Sexual activity: Not on file     Current Outpatient Medications on File Prior to Visit   Medication Sig   • apixaban (ELIQUIS) 5 mg Take 5 mg by mouth 2 (two) times a day   • bepotastine besilate (BEPREVE) 1.5 % op soln    • diltiazem (CARDIZEM CD) 120 mg 24 hr capsule    • folic acid (FOLVITE) 1 mg tablet    • furosemide (LASIX) 40 mg tablet Take 1 tablet (40 mg total) by mouth 2 (two) times a day   • metoprolol tartrate (LOPRESSOR) 50 mg tablet Take 1 tablet (50 mg total) by mouth every 12 (twelve) hours   • Multiple Vitamin (MULTIVITAMIN ADULT PO) Take by mouth   • Multiple Vitamins-Minerals (PRESERVISION AREDS PO) Take by mouth   • Omega-3 Fatty Acids (fish oil) 1,000 mg Take 1,000 mg by mouth daily   • omeprazole (PriLOSEC) 40 MG capsule Take 1 capsule (40 mg total) by mouth daily   • rOPINIRole (REQUIP) 3 mg tablet Take 1 tablet (3 mg total) by mouth daily at bedtime   • simvastatin (ZOCOR) 40 mg tablet Take 40 mg by mouth daily at bedtime   • Trexall 10 MG tablet    • vitamin B-12 (VITAMIN B-12) 1,000 mcg tablet Take by mouth daily   • alfuzosin (UROXATRAL) 10 mg 24 hr tablet Take 10 mg by mouth daily (Patient not taking: Reported on 3/25/2025)   • Multiple Vitamin (MULTIVITAMIN ADULT PO) ORAL   • prednisoLONE acetate (PRED FORTE) 1 % ophthalmic suspension  (Patient not taking: Reported on 1/30/2025)   • tamsulosin (FLOMAX) 0.4 mg Take 0.4 mg by mouth daily with dinner (Patient not taking: Reported on 4/28/2025)     Allergies   Allergen Reactions   • Penicillins      Immunization History   Administered Date(s) Administered   • COVID-19 MODERNA VACC 0.5 ML IM 02/27/2021, 03/28/2021   • INFLUENZA 10/10/2023   •  "Influenza, high dose seasonal 0.7 mL 10/10/2023, 02/17/2024   • influenza, trivalent, adjuvanted 08/15/2024     Objective   /64 (BP Location: Right arm, Patient Position: Sitting, Cuff Size: Large)   Pulse 83   Temp 97.6 °F (36.4 °C) (Tympanic)   Resp 16   Ht 5' 8.5\" (1.74 m)   Wt 123 kg (271 lb 6.4 oz)   SpO2 96%   BMI 40.67 kg/m²     Physical Exam  Constitutional:       Appearance: He is well-developed.   HENT:      Head: Normocephalic and atraumatic.   Eyes:      Conjunctiva/sclera: Conjunctivae normal.      Pupils: Pupils are equal, round, and reactive to light.   Cardiovascular:      Rate and Rhythm: Normal rate.   Pulmonary:      Effort: Pulmonary effort is normal.   Musculoskeletal:      Right lower leg: Edema present.      Left lower leg: Edema present.      Comments: Pitting edema bilateral +3   Neurological:      Mental Status: He is alert and oriented to person, place, and time.   Psychiatric:         Behavior: Behavior normal.         Thought Content: Thought content normal.         Judgment: Judgment normal.         "

## 2025-04-28 NOTE — ASSESSMENT & PLAN NOTE
10LB of Fluid.  VERY concerned. If 1 week still elevated, will send to ED for IV Lasix.  Recommend calling Cardiology for guidance, and advise of my plan.   See Cardiology Dr. Lazar.   Unsure if the patient can be switched to Bumex versus torsemide to see if there is better improvement.

## 2025-04-29 ENCOUNTER — APPOINTMENT (OUTPATIENT)
Dept: LAB | Facility: CLINIC | Age: 75
End: 2025-04-29
Payer: MEDICARE

## 2025-04-29 DIAGNOSIS — I25.10 ATHEROSCLEROSIS OF NATIVE CORONARY ARTERY, UNSPECIFIED WHETHER ANGINA PRESENT, UNSPECIFIED WHETHER NATIVE OR TRANSPLANTED HEART: ICD-10-CM

## 2025-04-29 DIAGNOSIS — R60.9 EDEMA, UNSPECIFIED TYPE: ICD-10-CM

## 2025-04-29 DIAGNOSIS — I50.9 HEART FAILURE, UNSPECIFIED HF CHRONICITY, UNSPECIFIED HEART FAILURE TYPE (HCC): ICD-10-CM

## 2025-04-29 LAB — BNP SERPL-MCNC: 22 PG/ML (ref 0–100)

## 2025-04-29 PROCEDURE — 83880 ASSAY OF NATRIURETIC PEPTIDE: CPT

## 2025-04-30 ENCOUNTER — APPOINTMENT (OUTPATIENT)
Dept: RADIOLOGY | Facility: CLINIC | Age: 75
End: 2025-04-30
Attending: ORTHOPAEDIC SURGERY
Payer: MEDICARE

## 2025-04-30 ENCOUNTER — OFFICE VISIT (OUTPATIENT)
Dept: OBGYN CLINIC | Facility: CLINIC | Age: 75
End: 2025-04-30
Payer: MEDICARE

## 2025-04-30 ENCOUNTER — RA CDI HCC (OUTPATIENT)
Dept: OTHER | Facility: HOSPITAL | Age: 75
End: 2025-04-30

## 2025-04-30 VITALS — BODY MASS INDEX: 38.66 KG/M2 | WEIGHT: 261 LBS | HEIGHT: 69 IN

## 2025-04-30 DIAGNOSIS — M25.562 CHRONIC PAIN OF BOTH KNEES: ICD-10-CM

## 2025-04-30 DIAGNOSIS — M25.561 PAIN IN BOTH KNEES, UNSPECIFIED CHRONICITY: ICD-10-CM

## 2025-04-30 DIAGNOSIS — R60.0 BILATERAL LEG EDEMA: ICD-10-CM

## 2025-04-30 DIAGNOSIS — G89.29 CHRONIC PAIN OF BOTH KNEES: ICD-10-CM

## 2025-04-30 DIAGNOSIS — M21.162 GENU VARUM OF LEFT LOWER EXTREMITY: ICD-10-CM

## 2025-04-30 DIAGNOSIS — Z98.890 S/P ABLATION OF ATRIAL FIBRILLATION: ICD-10-CM

## 2025-04-30 DIAGNOSIS — M25.562 PAIN IN BOTH KNEES, UNSPECIFIED CHRONICITY: ICD-10-CM

## 2025-04-30 DIAGNOSIS — M25.561 CHRONIC PAIN OF BOTH KNEES: ICD-10-CM

## 2025-04-30 DIAGNOSIS — Z86.79 S/P ABLATION OF ATRIAL FIBRILLATION: ICD-10-CM

## 2025-04-30 DIAGNOSIS — M17.0 PRIMARY OSTEOARTHRITIS OF BOTH KNEES: Primary | ICD-10-CM

## 2025-04-30 PROCEDURE — 73562 X-RAY EXAM OF KNEE 3: CPT

## 2025-04-30 PROCEDURE — 99214 OFFICE O/P EST MOD 30 MIN: CPT | Performed by: ORTHOPAEDIC SURGERY

## 2025-04-30 PROCEDURE — 20610 DRAIN/INJ JOINT/BURSA W/O US: CPT | Performed by: ORTHOPAEDIC SURGERY

## 2025-04-30 RX ORDER — TRIAMCINOLONE ACETONIDE 40 MG/ML
80 INJECTION, SUSPENSION INTRA-ARTICULAR; INTRAMUSCULAR
Status: COMPLETED | OUTPATIENT
Start: 2025-04-30 | End: 2025-04-30

## 2025-04-30 RX ORDER — BUPIVACAINE HYDROCHLORIDE 5 MG/ML
2 INJECTION, SOLUTION EPIDURAL; INTRACAUDAL; PERINEURAL
Status: COMPLETED | OUTPATIENT
Start: 2025-04-30 | End: 2025-04-30

## 2025-04-30 RX ADMIN — BUPIVACAINE HYDROCHLORIDE 2 ML: 5 INJECTION, SOLUTION EPIDURAL; INTRACAUDAL; PERINEURAL at 10:15

## 2025-04-30 RX ADMIN — TRIAMCINOLONE ACETONIDE 80 MG: 40 INJECTION, SUSPENSION INTRA-ARTICULAR; INTRAMUSCULAR at 10:15

## 2025-04-30 NOTE — PROGRESS NOTES
Name: Benjamin Liu      : 1950      MRN: [id number]  Encounter Provider: Jarad Gomez DO  Encounter Date: 2025   Encounter department: [facility]  :  Assessment & Plan  Primary osteoarthritis of both knees    Orders:    Large joint arthrocentesis: bilateral knee    Chronic pain of both knees    Orders:    XR knee 3 vw right non injury; Future    XR knee 3 vw left non injury; Future    Large joint arthrocentesis: bilateral knee    Bilateral leg edema         Genu varum of left lower extremity         S/P ablation of atrial fibrillation         BMI 39.0-39.9,adult              Benjamin Liu is a pleasant 74 y.o. male presenting today for follow-up of his activity related bilateral knee pain.  Updated mag marker imaging today demonstrates severe right knee osteoarthritis and severe end-stage left knee osteoarthritis with varus deformity.  He is still receiving approximately 2 and half months worth of relief from cortisone injections.  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  Therefore, we felt it reasonable to offer him repeat injections today.  He consented to and underwent bilateral knee cortisone injections as detailed below, which she tolerated well without difficulty or complication.  Postinjection instructions were provided.  We will plan to see him back in 3 months for reevaluation with consideration of repeat injection versus planning for left total knee arthroplasty.  We encouraged him to continue working with his primary care and cardiologist to address the edema in his lower extremities.  He expressed understanding all of his questions were addressed    I have personally reviewed pertinent films in PACS of the updated weightbearing mag marker x-rays taken today of his bilateral knees which demonstrate severe degenerative changes bilaterally with varus deformity.  The left knee demonstrates erosion of  "the medial tibial plateau and medial femoral condyle.  He has tricompartmental osteophytosis and narrowing of the patellofemoral compartment as well.      Large joint arthrocentesis: bilateral knee  Universal Protocol:  Consent: Verbal consent obtained.  Risks and benefits: risks, benefits and alternatives were discussed  Consent given by: patient  Time out: Immediately prior to procedure a \"time out\" was called to verify the correct patient, procedure, equipment, support staff and site/side marked as required.  Timeout called at: 4/30/2025 10:27 AM.  Site marked: the operative site was marked  Patient identity confirmed: verbally with patient  Supporting Documentation  Indications: pain     Is this a Visco injection? NoProcedure Details  Location: knee - bilateral knee  Preparation: Patient was prepped and draped in the usual sterile fashion  Needle size: 20 G  Ultrasound guidance: no  Approach: anterolateral    Medications (Right): 2 mL bupivacaine (PF) 0.5 %; 80 mg triamcinolone acetonide 40 mg/mLMedications (Left): 2 mL bupivacaine (PF) 0.5 %; 80 mg triamcinolone acetonide 40 mg/mL   Patient tolerance: patient tolerated the procedure well with no immediate complications  Dressing:  Sterile dressing applied        Subjective: Bilateral knee follow-up    History: Benjamin Liu is a 74 y.o. male presenting today for follow-up 3 months after his last appointment for his bilateral knee pain due to his underlying osteoarthritis.  He reports that the cortisone injections provided did give him relief for approximately 2-1/2 months.  Over the past 2 to 3 weeks, he has noted a return of his activity related discomfort.  He denies any new injuries or falls.  He does state that he has had difficulty controlling the edema in his lower extremities and has been working with his cardiologist at [facility], but continues to have swelling in both legs    Estimated body mass index is 39.11 kg/m² as calculated from the following:    " "Height as of this encounter: 5' 8.5\" (1.74 m).    Weight as of this encounter: 118 kg (261 lb).    Lab Results   Component Value Date    HGBA1C 5.4 10/23/2023       Social History     Occupational History    Not on file   Tobacco Use    Smoking status: Former     Current packs/day: 0.00     Types: Cigarettes     Start date: 7/15/1965     Quit date: 3/10/2010     Years since quitting: 15.1    Smokeless tobacco: Never   Vaping Use    Vaping status: Never Used   Substance and Sexual Activity    Alcohol use: Yes     Comment: occas    Drug use: Yes    Sexual activity: Not on file       Objective:  Right Knee Exam     Muscle Strength   The patient has normal right knee strength.    Tenderness   The patient is experiencing tenderness in the medial joint line and patella.    Range of Motion   Extension:  0 normal   Flexion:  120 normal     Tests   Varus: negative Valgus: negative  Drawer:  Anterior - negative      Patellar apprehension: negative    Other   Erythema: absent  Scars: absent  Sensation: normal  Pulse: present  Swelling: mild  Effusion: no effusion present      Left Knee Exam     Muscle Strength   The patient has normal left knee strength.    Tenderness   The patient is experiencing tenderness in the medial joint line and patella.    Range of Motion   Extension:  5 abnormal   Flexion:  110 abnormal     Tests   Varus: negative Valgus: negative  Drawer:  Anterior - negative       Patellar apprehension: negative    Other   Erythema: absent  Scars: present  Sensation: normal  Pulse: present  Swelling: none  Effusion: no effusion present    Comments:  10 degree passively correctable varus deformity  Positive patellofemoral crepitance bilaterally  Ambulates with a cane and an antalgic gait on the left  Grossly distally neurovascularly changed          Observations   Left Knee   Negative for effusion.     Right Knee   Negative for effusion.       There were no vitals filed for this visit.    Past Medical History: "   Diagnosis Date    Cardiac disease     atrial fib,pacemaker    Hyperlipidemia     Hypertension        Past Surgical History:   Procedure Laterality Date    AV NODE ABLATION      FL INJECTION LEFT KNEE (ARTHROGRAM)  12/06/2022       History reviewed. No pertinent family history.      Current Outpatient Medications:     apixaban (ELIQUIS) 5 mg, Take 5 mg by mouth 2 (two) times a day, Disp: , Rfl:     bepotastine besilate (BEPREVE) 1.5 % op soln, , Disp: , Rfl:     diltiazem (CARDIZEM CD) 120 mg 24 hr capsule, , Disp: , Rfl:     folic acid (FOLVITE) 1 mg tablet, , Disp: , Rfl:     furosemide (LASIX) 40 mg tablet, Take 1 tablet (40 mg total) by mouth 2 (two) times a day, Disp: 180 tablet, Rfl: 1    metoprolol tartrate (LOPRESSOR) 50 mg tablet, Take 1 tablet (50 mg total) by mouth every 12 (twelve) hours, Disp: 180 tablet, Rfl: 2    Multiple Vitamin (MULTIVITAMIN ADULT PO), Take by mouth, Disp: , Rfl:     Multiple Vitamin (MULTIVITAMIN ADULT PO), ORAL, Disp: , Rfl:     Multiple Vitamins-Minerals (PRESERVISION AREDS PO), Take by mouth, Disp: , Rfl:     Omega-3 Fatty Acids (fish oil) 1,000 mg, Take 1,000 mg by mouth daily, Disp: , Rfl:     omeprazole (PriLOSEC) 40 MG capsule, Take 1 capsule (40 mg total) by mouth daily, Disp: 90 capsule, Rfl: 1    rOPINIRole (REQUIP) 3 mg tablet, Take 1 tablet (3 mg total) by mouth daily at bedtime, Disp: 90 tablet, Rfl: 1    simvastatin (ZOCOR) 40 mg tablet, Take 40 mg by mouth daily at bedtime, Disp: , Rfl:     Trexall 10 MG tablet, , Disp: , Rfl:     vitamin B-12 (VITAMIN B-12) 1,000 mcg tablet, Take by mouth daily, Disp: , Rfl:     alfuzosin (UROXATRAL) 10 mg 24 hr tablet, Take 10 mg by mouth daily (Patient not taking: Reported on 3/25/2025), Disp: , Rfl:     prednisoLONE acetate (PRED FORTE) 1 % ophthalmic suspension, , Disp: , Rfl:     tamsulosin (FLOMAX) 0.4 mg, Take 0.4 mg by mouth daily with dinner (Patient not taking: Reported on 3/25/2025), Disp: , Rfl:     Allergies   Allergen  Reactions    Penicillins      Review of Systems   Constitutional:  Positive for activity change.   HENT: Negative.     Eyes: Negative.    Respiratory: Negative.     Cardiovascular:  Positive for leg swelling.   Gastrointestinal: Negative.    Endocrine: Negative.    Genitourinary: Negative.    Musculoskeletal:  Positive for arthralgias, gait problem, joint swelling and myalgias.   Skin: Negative.    Allergic/Immunologic: Negative.    Hematological: Negative.    Psychiatric/Behavioral: Negative.       Physical Exam  Vitals and nursing note reviewed.   Constitutional:       Appearance: Normal appearance. He is well-developed.      Comments: Body mass index is 39.11 kg/m².   HENT:      Head: Normocephalic and atraumatic.      Right Ear: External ear normal.      Left Ear: External ear normal.   Eyes:      Extraocular Movements: Extraocular movements intact.      Conjunctiva/sclera: Conjunctivae normal.   Cardiovascular:      Rate and Rhythm: Normal rate.      Pulses: Normal pulses.   Pulmonary:      Effort: Pulmonary effort is normal.   Musculoskeletal:      Cervical back: Normal range of motion.      Right knee: No effusion.      Left knee: No effusion.      Comments: See ortho exam   Skin:     General: Skin is warm and dry.   Neurological:      General: No focal deficit present.      Mental Status: He is alert and oriented to person, place, and time. Mental status is at baseline.   Psychiatric:         Mood and Affect: Mood normal.         Behavior: Behavior normal.         Thought Content: Thought content normal.         Judgment: Judgment normal.       Scribe Attestation      I,:  Mayank Myers PA-C am acting as a scribe while in the presence of the attending physician.:       I,:  Jarad Gomez, DO personally performed the services described in this documentation    as scribed in my presence.:             This document was created using speech voice recognition software.   Grammatical errors, random  word insertions, pronoun errors, and incomplete sentences are an occasional consequence of this system due to software limitations, ambient noise, and hardware issues.   Any formal questions or concerns about content, text, or information contained within the body of this dictation should be directly addressed to the provider for clarification.

## 2025-05-02 ENCOUNTER — TELEPHONE (OUTPATIENT)
Age: 75
End: 2025-05-02

## 2025-05-02 NOTE — TELEPHONE ENCOUNTER
Dr Richmond's office called and is requesting a copy of patient's most recent lab results to please be faxed to them at: [phone]

## 2025-05-06 ENCOUNTER — OFFICE VISIT (OUTPATIENT)
Dept: FAMILY MEDICINE CLINIC | Facility: CLINIC | Age: 75
End: 2025-05-06
Payer: MEDICARE

## 2025-05-06 VITALS
DIASTOLIC BLOOD PRESSURE: 70 MMHG | TEMPERATURE: 97.3 F | RESPIRATION RATE: 18 BRPM | WEIGHT: 262.8 LBS | BODY MASS INDEX: 38.92 KG/M2 | HEART RATE: 67 BPM | SYSTOLIC BLOOD PRESSURE: 122 MMHG | HEIGHT: 69 IN | OXYGEN SATURATION: 96 %

## 2025-05-06 DIAGNOSIS — I27.20 PULMONARY HYPERTENSION, UNSPECIFIED (HCC): ICD-10-CM

## 2025-05-06 DIAGNOSIS — M79.89 LEG SWELLING: Primary | ICD-10-CM

## 2025-05-06 DIAGNOSIS — I48.91 ATRIAL FIBRILLATION WITH RVR (HCC): ICD-10-CM

## 2025-05-06 DIAGNOSIS — I87.2 EDEMA OF BOTH LOWER EXTREMITIES DUE TO PERIPHERAL VENOUS INSUFFICIENCY: ICD-10-CM

## 2025-05-06 PROCEDURE — 99214 OFFICE O/P EST MOD 30 MIN: CPT | Performed by: FAMILY MEDICINE

## 2025-05-06 PROCEDURE — G2211 COMPLEX E/M VISIT ADD ON: HCPCS | Performed by: FAMILY MEDICINE

## 2025-05-06 NOTE — PROGRESS NOTES
Name: Benjamin Liu      : 1950      MRN: [id number]  Encounter Provider: Jayla Weir MD  Encounter Date: 2025   Encounter department: [facility]    Assessment & Plan  Leg swelling  Referred to Nephrology-> I would recommend Bumex or another stronger medication.  He is very DEPENDENT  on pump 3 times a day just to remove fluid  I have him on  STRICK diet of no salt  I don't want him living on his pumps daily and with a GFR so well think it would be best to change meds around.   EF 55% ECHO unchanged         Atrial fibrillation with RVR (HCC)  Being managed by cardiology       Edema of both lower extremities due to peripheral venous insufficiency  Continue using pumps 3 times a day       Pulmonary hypertension, unspecified (HCC)              History of Present Illness     HPI  74-year-old male coming in for weight check.  Patient was just placed on a very strict no salt diet as well as making sure he is using his pumps 3 times a day.  Patient states he has been battling this swelling in his legs for multiple years now.  States that he has been having the same diuretics and does not feel like it is effective.  Was advised by his cardiologist Dr. Larios that there are no other diuretics for him.  Patient has a history of A-fib but has currently been stable.  Patient has not been seen by a nephrologist  Review of Systems   Constitutional:  Positive for fatigue. Negative for activity change, appetite change, chills and fever.   HENT:  Negative for congestion.    Respiratory:  Negative for cough, chest tightness and shortness of breath.    Cardiovascular:  Positive for leg swelling. Negative for chest pain.   Gastrointestinal:  Negative for abdominal distention, abdominal pain, constipation, diarrhea, nausea and vomiting.   All other systems reviewed and are negative.    Past Medical History:   Diagnosis Date   • Cardiac disease     atrial fib,pacemaker   • Hyperlipidemia     • Hypertension      Past Surgical History:   Procedure Laterality Date   • AV NODE ABLATION     • FL INJECTION LEFT KNEE (ARTHROGRAM)  12/06/2022     History reviewed. No pertinent family history.  Social History     Tobacco Use   • Smoking status: Former     Current packs/day: 0.00     Types: Cigarettes     Start date: 7/15/1965     Quit date: 3/10/2010     Years since quitting: 15.1   • Smokeless tobacco: Never   Vaping Use   • Vaping status: Never Used   Substance and Sexual Activity   • Alcohol use: Not Currently     Comment: occas   • Drug use: Yes   • Sexual activity: Not on file     Current Outpatient Medications on File Prior to Visit   Medication Sig   • apixaban (ELIQUIS) 5 mg Take 5 mg by mouth 2 (two) times a day   • diltiazem (CARDIZEM CD) 120 mg 24 hr capsule    • folic acid (FOLVITE) 1 mg tablet    • furosemide (LASIX) 40 mg tablet Take 1 tablet (40 mg total) by mouth 2 (two) times a day   • metoprolol tartrate (LOPRESSOR) 50 mg tablet Take 1 tablet (50 mg total) by mouth every 12 (twelve) hours   • Multiple Vitamin (MULTIVITAMIN ADULT PO) Take by mouth   • Multiple Vitamin (MULTIVITAMIN ADULT PO) ORAL   • Multiple Vitamins-Minerals (PRESERVISION AREDS PO) Take by mouth   • Omega-3 Fatty Acids (fish oil) 1,000 mg Take 1,000 mg by mouth daily   • omeprazole (PriLOSEC) 40 MG capsule Take 1 capsule (40 mg total) by mouth daily   • rOPINIRole (REQUIP) 3 mg tablet Take 1 tablet (3 mg total) by mouth daily at bedtime   • simvastatin (ZOCOR) 40 mg tablet Take 40 mg by mouth daily at bedtime   • Trexall 10 MG tablet    • vitamin B-12 (VITAMIN B-12) 1,000 mcg tablet Take by mouth daily   • alfuzosin (UROXATRAL) 10 mg 24 hr tablet Take 10 mg by mouth daily (Patient not taking: Reported on 3/25/2025)   • bepotastine besilate (BEPREVE) 1.5 % op soln  (Patient not taking: Reported on 5/6/2025)   • prednisoLONE acetate (PRED FORTE) 1 % ophthalmic suspension  (Patient not taking: Reported on 1/30/2025)   •  "tamsulosin (FLOMAX) 0.4 mg Take 0.4 mg by mouth daily with dinner (Patient not taking: Reported on 5/6/2025)     Allergies   Allergen Reactions   • Penicillins Hives     Immunization History   Administered Date(s) Administered   • COVID-19 MODERNA VACC 0.5 ML IM 02/27/2021, 03/28/2021   • INFLUENZA 10/10/2023   • Influenza, high dose seasonal 0.7 mL 10/10/2023, 02/17/2024   • influenza, trivalent, adjuvanted 08/15/2024     Objective   /70 (BP Location: Left arm, Patient Position: Sitting, Cuff Size: Standard)   Pulse 67   Temp (!) 97.3 °F (36.3 °C) (Temporal)   Resp 18   Ht 5' 8.5\" (1.74 m)   Wt 119 kg (262 lb 12.8 oz)   SpO2 96%   BMI 39.38 kg/m²     Physical Exam  Constitutional:       Appearance: He is well-developed.   HENT:      Head: Normocephalic and atraumatic.   Eyes:      Conjunctiva/sclera: Conjunctivae normal.      Pupils: Pupils are equal, round, and reactive to light.   Cardiovascular:      Rate and Rhythm: Normal rate.      Pulses: Normal pulses.      Heart sounds: No murmur heard.  Pulmonary:      Effort: Pulmonary effort is normal.   Musculoskeletal:      Right lower leg: Edema present.      Left lower leg: Edema present.      Comments: Improvement not showing any pitting edema today but does have trace to +1   Neurological:      Mental Status: He is alert and oriented to person, place, and time.   Psychiatric:         Behavior: Behavior normal.         Thought Content: Thought content normal.         Judgment: Judgment normal.         "

## 2025-05-22 ENCOUNTER — TELEPHONE (OUTPATIENT)
Age: 75
End: 2025-05-22

## 2025-05-22 DIAGNOSIS — I27.20 PULMONARY HYPERTENSION, UNSPECIFIED (HCC): ICD-10-CM

## 2025-05-22 DIAGNOSIS — I48.91 ATRIAL FIBRILLATION WITH RVR (HCC): ICD-10-CM

## 2025-05-22 DIAGNOSIS — I87.2 EDEMA OF BOTH LOWER EXTREMITIES DUE TO PERIPHERAL VENOUS INSUFFICIENCY: Primary | ICD-10-CM

## 2025-05-22 NOTE — TELEPHONE ENCOUNTER
Patient called states did not receive a call from Nephrologist to scheduled appointment. Wanted Dr Eagle to know and would like to know what he should do or who he should call (no referral in chart)

## 2025-05-23 NOTE — TELEPHONE ENCOUNTER
Placed referral for him. For somereason the pass referral didn't go through. Can we call nephrology, so they can call and coordinate care.

## 2025-05-29 ENCOUNTER — OFFICE VISIT (OUTPATIENT)
Dept: FAMILY MEDICINE CLINIC | Facility: CLINIC | Age: 75
End: 2025-05-29
Payer: MEDICARE

## 2025-05-29 VITALS
WEIGHT: 261.5 LBS | DIASTOLIC BLOOD PRESSURE: 70 MMHG | BODY MASS INDEX: 38.73 KG/M2 | SYSTOLIC BLOOD PRESSURE: 122 MMHG | OXYGEN SATURATION: 96 % | RESPIRATION RATE: 18 BRPM | TEMPERATURE: 97.7 F | HEIGHT: 69 IN | HEART RATE: 85 BPM

## 2025-05-29 DIAGNOSIS — R73.09 ABNORMAL GLUCOSE: ICD-10-CM

## 2025-05-29 DIAGNOSIS — I87.2 EDEMA OF BOTH LOWER EXTREMITIES DUE TO PERIPHERAL VENOUS INSUFFICIENCY: Primary | ICD-10-CM

## 2025-05-29 PROCEDURE — G2211 COMPLEX E/M VISIT ADD ON: HCPCS | Performed by: FAMILY MEDICINE

## 2025-05-29 PROCEDURE — 99214 OFFICE O/P EST MOD 30 MIN: CPT | Performed by: FAMILY MEDICINE

## 2025-05-29 RX ORDER — TORSEMIDE 20 MG/1
20 TABLET ORAL 2 TIMES DAILY
Qty: 60 TABLET | Refills: 0 | Status: SHIPPED | OUTPATIENT
Start: 2025-05-29 | End: 2025-06-04 | Stop reason: SDUPTHER

## 2025-06-02 NOTE — ASSESSMENT & PLAN NOTE
Edema dependent on palms  Remove Lasix 40 mg twice daily  Started on 20 mg torsemide twice daily  Will likely need to increase to 40 mg twice daily  Pending repeat blood work next Wednesday  Orders:  •  torsemide (DEMADEX) 20 mg tablet; Take 1 tablet (20 mg total) by mouth in the morning and 1 tablet (20 mg total) before bedtime.  •  Basic metabolic panel; Future

## 2025-06-02 NOTE — PROGRESS NOTES
"Name: Benjamin Liu      : 1950      MRN: [id number]  Encounter Provider: Jayla Weir MD  Encounter Date: 2025   Encounter department: [facility]    Assessment & Plan  Abnormal glucose  Pending repeat       Edema of both lower extremities due to peripheral venous insufficiency  Edema dependent on palms  Remove Lasix 40 mg twice daily  Started on 20 mg torsemide twice daily  Will likely need to increase to 40 mg twice daily  Pending repeat blood work next Wednesday  Orders:  •  torsemide (DEMADEX) 20 mg tablet; Take 1 tablet (20 mg total) by mouth in the morning and 1 tablet (20 mg total) before bedtime.  •  Basic metabolic panel; Future         History of Present Illness     HPI  74-year-old male presenting to the office given that he is concerned that he has to wait this long in order to see a nephrologist.  He wants to change his diuretic states that he has been on it for more than 10 years with no improvement of his swelling is dependent on his pumps he states that he would like to be switched and understands that he still needs to follow-up with a nephrologist  Review of Systems   Cardiovascular:  Positive for leg swelling.     Past Medical History[1]  Past Surgical History[2]  Family History[3]  Social History[4]  Medications[5]  Allergies   Allergen Reactions   • Penicillins Hives     Immunization History   Administered Date(s) Administered   • COVID-19 MODERNA VACC 0.5 ML IM 2021, 2021   • INFLUENZA 10/10/2023   • Influenza, high dose seasonal 0.7 mL 10/10/2023, 2024   • influenza, trivalent, adjuvanted 08/15/2024     Objective   /70 (BP Location: Left arm, Patient Position: Sitting, Cuff Size: Standard)   Pulse 85   Temp 97.7 °F (36.5 °C) (Temporal)   Resp 18   Ht 5' 8.5\" (1.74 m)   Wt 119 kg (261 lb 8 oz)   SpO2 96%   BMI 39.18 kg/m²     Physical Exam  Constitutional:       Appearance: He is well-developed.   HENT:      Head: " Normocephalic and atraumatic.     Eyes:      Conjunctiva/sclera: Conjunctivae normal.      Pupils: Pupils are equal, round, and reactive to light.     Pulmonary:      Effort: Pulmonary effort is normal.     Neurological:      Mental Status: He is alert and oriented to person, place, and time.     Psychiatric:         Behavior: Behavior normal.         Thought Content: Thought content normal.         Judgment: Judgment normal.                [1]  Past Medical History:  Diagnosis Date   • Cardiac disease     atrial fib,pacemaker   • Hyperlipidemia    • Hypertension    [2]  Past Surgical History:  Procedure Laterality Date   • AV NODE ABLATION     • FL INJECTION LEFT KNEE (ARTHROGRAM)  12/06/2022   [3]  No family history on file.[4]  Social History  Tobacco Use   • Smoking status: Former     Current packs/day: 0.00     Types: Cigarettes     Start date: 7/15/1965     Quit date: 3/10/2010     Years since quitting: 15.2   • Smokeless tobacco: Never   Vaping Use   • Vaping status: Never Used   Substance and Sexual Activity   • Alcohol use: Not Currently     Comment: occas   • Drug use: Yes   [5]  Current Outpatient Medications on File Prior to Visit   Medication Sig   • apixaban (ELIQUIS) 5 mg Take 5 mg by mouth in the morning and 5 mg in the evening.   • diltiazem (CARDIZEM CD) 120 mg 24 hr capsule    • folic acid (FOLVITE) 1 mg tablet    • furosemide (LASIX) 40 mg tablet Take 1 tablet (40 mg total) by mouth 2 (two) times a day   • metoprolol tartrate (LOPRESSOR) 50 mg tablet Take 1 tablet (50 mg total) by mouth every 12 (twelve) hours   • Multiple Vitamin (MULTIVITAMIN ADULT PO) Take by mouth   • Multiple Vitamin (MULTIVITAMIN ADULT PO)    • Multiple Vitamins-Minerals (PRESERVISION AREDS PO) Take by mouth   • Omega-3 Fatty Acids (fish oil) 1,000 mg Take 1,000 mg by mouth in the morning.   • omeprazole (PriLOSEC) 40 MG capsule Take 1 capsule (40 mg total) by mouth daily   • rOPINIRole (REQUIP) 3 mg tablet Take 1 tablet (3  mg total) by mouth daily at bedtime   • simvastatin (ZOCOR) 40 mg tablet Take 40 mg by mouth daily at bedtime   • Trexall 10 MG tablet    • vitamin B-12 (VITAMIN B-12) 1,000 mcg tablet Take by mouth in the morning.   • alfuzosin (UROXATRAL) 10 mg 24 hr tablet Take 10 mg by mouth daily (Patient not taking: Reported on 3/25/2025)   • bepotastine besilate (BEPREVE) 1.5 % op soln  (Patient not taking: Reported on 5/29/2025)   • prednisoLONE acetate (PRED FORTE) 1 % ophthalmic suspension  (Patient not taking: Reported on 5/29/2025)   • tamsulosin (FLOMAX) 0.4 mg Take 0.4 mg by mouth daily with dinner (Patient not taking: Reported on 3/25/2025)

## 2025-06-04 ENCOUNTER — APPOINTMENT (OUTPATIENT)
Dept: LAB | Facility: CLINIC | Age: 75
End: 2025-06-04
Payer: MEDICARE

## 2025-06-04 ENCOUNTER — RESULTS FOLLOW-UP (OUTPATIENT)
Dept: FAMILY MEDICINE CLINIC | Facility: CLINIC | Age: 75
End: 2025-06-04

## 2025-06-04 DIAGNOSIS — I87.2 EDEMA OF BOTH LOWER EXTREMITIES DUE TO PERIPHERAL VENOUS INSUFFICIENCY: ICD-10-CM

## 2025-06-04 LAB
ANION GAP SERPL CALCULATED.3IONS-SCNC: 8 MMOL/L (ref 4–13)
BUN SERPL-MCNC: 29 MG/DL (ref 5–25)
CALCIUM SERPL-MCNC: 9.2 MG/DL (ref 8.4–10.2)
CHLORIDE SERPL-SCNC: 99 MMOL/L (ref 96–108)
CO2 SERPL-SCNC: 35 MMOL/L (ref 21–32)
CREAT SERPL-MCNC: 0.95 MG/DL (ref 0.6–1.3)
GFR SERPL CREATININE-BSD FRML MDRD: 77 ML/MIN/1.73SQ M
GLUCOSE P FAST SERPL-MCNC: 129 MG/DL (ref 65–99)
POTASSIUM SERPL-SCNC: 4.1 MMOL/L (ref 3.5–5.3)
SODIUM SERPL-SCNC: 142 MMOL/L (ref 135–147)

## 2025-06-04 PROCEDURE — 36415 COLL VENOUS BLD VENIPUNCTURE: CPT

## 2025-06-04 PROCEDURE — 80048 BASIC METABOLIC PNL TOTAL CA: CPT

## 2025-06-04 RX ORDER — TORSEMIDE 20 MG/1
20 TABLET ORAL 2 TIMES DAILY
Qty: 180 TABLET | Refills: 2 | Status: SHIPPED | OUTPATIENT
Start: 2025-06-04

## 2025-06-04 RX ORDER — TORSEMIDE 20 MG/1
20 TABLET ORAL 2 TIMES DAILY
Qty: 60 TABLET | Refills: 0 | Status: CANCELLED | OUTPATIENT
Start: 2025-06-04

## 2025-06-05 DIAGNOSIS — I87.2 EDEMA OF BOTH LOWER EXTREMITIES DUE TO PERIPHERAL VENOUS INSUFFICIENCY: Primary | ICD-10-CM

## 2025-07-23 ENCOUNTER — TELEPHONE (OUTPATIENT)
Dept: NEPHROLOGY | Facility: CLINIC | Age: 75
End: 2025-07-23

## 2025-07-30 VITALS — WEIGHT: 262 LBS | BODY MASS INDEX: 38.8 KG/M2 | HEIGHT: 69 IN

## 2025-07-30 DIAGNOSIS — F17.220 CHEWING TOBACCO NICOTINE DEPENDENCE WITHOUT COMPLICATION: ICD-10-CM

## 2025-07-30 DIAGNOSIS — I89.0 LYMPHEDEMA: ICD-10-CM

## 2025-07-30 DIAGNOSIS — K21.9 GASTROESOPHAGEAL REFLUX DISEASE WITHOUT ESOPHAGITIS: ICD-10-CM

## 2025-07-30 DIAGNOSIS — I87.2 EDEMA OF BOTH LOWER EXTREMITIES DUE TO PERIPHERAL VENOUS INSUFFICIENCY: ICD-10-CM

## 2025-07-30 DIAGNOSIS — M17.12 PRIMARY OSTEOARTHRITIS OF LEFT KNEE: Primary | ICD-10-CM

## 2025-07-30 DIAGNOSIS — I27.20 PULMONARY HYPERTENSION, UNSPECIFIED (HCC): ICD-10-CM

## 2025-07-30 DIAGNOSIS — Z86.79 HISTORY OF SICK SINUS SYNDROME: ICD-10-CM

## 2025-07-30 DIAGNOSIS — M25.561 CHRONIC PAIN OF RIGHT KNEE: ICD-10-CM

## 2025-07-30 DIAGNOSIS — G89.29 CHRONIC PAIN OF LEFT KNEE: ICD-10-CM

## 2025-07-30 DIAGNOSIS — I48.91 ATRIAL FIBRILLATION WITH RVR (HCC): ICD-10-CM

## 2025-07-30 DIAGNOSIS — M17.11 PRIMARY OSTEOARTHRITIS OF RIGHT KNEE: ICD-10-CM

## 2025-07-30 DIAGNOSIS — G89.29 CHRONIC PAIN OF RIGHT KNEE: ICD-10-CM

## 2025-07-30 DIAGNOSIS — N40.0 BENIGN PROSTATIC HYPERPLASIA WITHOUT URINARY OBSTRUCTION: ICD-10-CM

## 2025-07-30 DIAGNOSIS — C93.Z0: ICD-10-CM

## 2025-07-30 DIAGNOSIS — M25.562 CHRONIC PAIN OF LEFT KNEE: ICD-10-CM

## 2025-07-30 DIAGNOSIS — Z01.818 PRE-OPERATIVE EXAMINATION: ICD-10-CM

## 2025-07-30 PROBLEM — I25.10 CORONARY ARTERY DISEASE: Status: ACTIVE | Noted: 2025-07-30

## 2025-07-30 PROBLEM — Z95.0 PACEMAKER: Status: ACTIVE | Noted: 2024-02-02

## 2025-07-30 PROBLEM — F17.200 NICOTINE DEPENDENCE: Status: ACTIVE | Noted: 2025-07-30

## 2025-07-30 PROCEDURE — 99215 OFFICE O/P EST HI 40 MIN: CPT | Performed by: ORTHOPAEDIC SURGERY

## 2025-07-30 PROCEDURE — 20610 DRAIN/INJ JOINT/BURSA W/O US: CPT | Performed by: ORTHOPAEDIC SURGERY

## 2025-07-30 RX ORDER — CHLORHEXIDINE GLUCONATE ORAL RINSE 1.2 MG/ML
15 SOLUTION DENTAL ONCE
OUTPATIENT
Start: 2025-07-30 | End: 2025-07-30

## 2025-07-30 RX ORDER — GABAPENTIN 300 MG/1
300 CAPSULE ORAL ONCE
OUTPATIENT
Start: 2025-07-30 | End: 2025-07-30

## 2025-07-30 RX ORDER — ACETAMINOPHEN 325 MG/1
975 TABLET ORAL ONCE
OUTPATIENT
Start: 2025-07-30 | End: 2025-07-30

## 2025-07-30 RX ADMIN — BUPIVACAINE HYDROCHLORIDE 2 ML: 5 INJECTION, SOLUTION EPIDURAL; INTRACAUDAL; PERINEURAL at 10:00

## 2025-07-30 RX ADMIN — TRIAMCINOLONE ACETONIDE 80 MG: 40 INJECTION, SUSPENSION INTRA-ARTICULAR; INTRAMUSCULAR at 10:00

## 2025-07-31 RX ORDER — TRIAMCINOLONE ACETONIDE 40 MG/ML
80 INJECTION, SUSPENSION INTRA-ARTICULAR; INTRAMUSCULAR
Status: COMPLETED | OUTPATIENT
Start: 2025-07-30 | End: 2025-07-30

## 2025-07-31 RX ORDER — BUPIVACAINE HYDROCHLORIDE 5 MG/ML
2 INJECTION, SOLUTION EPIDURAL; INTRACAUDAL; PERINEURAL
Status: COMPLETED | OUTPATIENT
Start: 2025-07-30 | End: 2025-07-30

## 2025-08-01 ENCOUNTER — CONSULT (OUTPATIENT)
Dept: NEPHROLOGY | Facility: CLINIC | Age: 75
End: 2025-08-01
Attending: FAMILY MEDICINE
Payer: MEDICARE

## 2025-08-01 VITALS
SYSTOLIC BLOOD PRESSURE: 124 MMHG | WEIGHT: 263 LBS | HEART RATE: 68 BPM | BODY MASS INDEX: 38.95 KG/M2 | HEIGHT: 69 IN | DIASTOLIC BLOOD PRESSURE: 76 MMHG

## 2025-08-01 DIAGNOSIS — I87.2 EDEMA OF BOTH LOWER EXTREMITIES DUE TO PERIPHERAL VENOUS INSUFFICIENCY: Primary | ICD-10-CM

## 2025-08-01 DIAGNOSIS — I27.20 PULMONARY HYPERTENSION, UNSPECIFIED (HCC): ICD-10-CM

## 2025-08-01 DIAGNOSIS — I48.91 ATRIAL FIBRILLATION WITH RVR (HCC): ICD-10-CM

## 2025-08-01 PROCEDURE — 99204 OFFICE O/P NEW MOD 45 MIN: CPT | Performed by: INTERNAL MEDICINE

## 2025-08-01 RX ORDER — TORSEMIDE 20 MG/1
60 TABLET ORAL DAILY
Qty: 270 TABLET | Refills: 0 | Status: SHIPPED | OUTPATIENT
Start: 2025-08-01

## 2025-08-04 ENCOUNTER — TELEPHONE (OUTPATIENT)
Dept: OBGYN CLINIC | Facility: CLINIC | Age: 75
End: 2025-08-04

## 2025-08-15 ENCOUNTER — APPOINTMENT (OUTPATIENT)
Dept: LAB | Facility: CLINIC | Age: 75
End: 2025-08-15
Attending: INTERNAL MEDICINE
Payer: MEDICARE

## 2025-08-18 ENCOUNTER — RESULTS FOLLOW-UP (OUTPATIENT)
Dept: NEPHROLOGY | Facility: CLINIC | Age: 75
End: 2025-08-18

## 2025-08-18 DIAGNOSIS — I87.2 EDEMA OF BOTH LOWER EXTREMITIES DUE TO PERIPHERAL VENOUS INSUFFICIENCY: Primary | ICD-10-CM

## 2025-08-18 DIAGNOSIS — E78.5 HYPERLIPIDEMIA, UNSPECIFIED HYPERLIPIDEMIA TYPE: ICD-10-CM

## 2025-08-19 ENCOUNTER — TELEPHONE (OUTPATIENT)
Dept: FAMILY MEDICINE CLINIC | Facility: CLINIC | Age: 75
End: 2025-08-19

## 2025-08-20 DIAGNOSIS — K21.9 GASTROESOPHAGEAL REFLUX DISEASE WITHOUT ESOPHAGITIS: ICD-10-CM

## 2025-08-21 RX ORDER — OMEPRAZOLE 40 MG/1
40 CAPSULE, DELAYED RELEASE ORAL DAILY
Qty: 90 CAPSULE | Refills: 1 | Status: SHIPPED | OUTPATIENT
Start: 2025-08-21